# Patient Record
Sex: MALE | Race: WHITE | Employment: FULL TIME | URBAN - NONMETROPOLITAN AREA
[De-identification: names, ages, dates, MRNs, and addresses within clinical notes are randomized per-mention and may not be internally consistent; named-entity substitution may affect disease eponyms.]

---

## 2017-08-14 ENCOUNTER — HOSPITAL ENCOUNTER (OUTPATIENT)
Dept: OTHER | Age: 37
Discharge: HOME | End: 2017-08-31
Attending: PREVENTIVE MEDICINE | Admitting: PREVENTIVE MEDICINE

## 2017-09-01 ENCOUNTER — HOSPITAL ENCOUNTER (OUTPATIENT)
Dept: OTHER | Age: 37
Discharge: HOME | End: 2017-09-30
Attending: PREVENTIVE MEDICINE | Admitting: PREVENTIVE MEDICINE

## 2017-10-01 ENCOUNTER — HOSPITAL ENCOUNTER (OUTPATIENT)
Dept: OTHER | Age: 37
Discharge: HOME | End: 2017-10-31
Attending: PREVENTIVE MEDICINE | Admitting: PREVENTIVE MEDICINE

## 2017-11-01 ENCOUNTER — HOSPITAL ENCOUNTER (OUTPATIENT)
Dept: OTHER | Age: 37
Discharge: HOME | End: 2017-11-30
Attending: PREVENTIVE MEDICINE | Admitting: PREVENTIVE MEDICINE

## 2017-12-01 ENCOUNTER — HOSPITAL ENCOUNTER (OUTPATIENT)
Dept: OTHER | Age: 37
Discharge: HOME | End: 2017-12-31
Attending: PREVENTIVE MEDICINE | Admitting: PREVENTIVE MEDICINE

## 2018-01-01 ENCOUNTER — HOSPITAL ENCOUNTER (OUTPATIENT)
Dept: OTHER | Age: 38
Discharge: HOME | End: 2018-01-31
Attending: PREVENTIVE MEDICINE | Admitting: PREVENTIVE MEDICINE

## 2019-04-19 ENCOUNTER — HOSPITAL ENCOUNTER (EMERGENCY)
Age: 39
Discharge: HOME | End: 2019-04-19
Attending: EMERGENCY MEDICINE

## 2019-04-19 VITALS
DIASTOLIC BLOOD PRESSURE: 61 MMHG | WEIGHT: 150 LBS | OXYGEN SATURATION: 100 % | BODY MASS INDEX: 22.22 KG/M2 | TEMPERATURE: 98.8 F | SYSTOLIC BLOOD PRESSURE: 110 MMHG | HEIGHT: 69 IN | RESPIRATION RATE: 16 BRPM | HEART RATE: 76 BPM

## 2019-04-19 DIAGNOSIS — R19.7 DIARRHEA, UNSPECIFIED TYPE: ICD-10-CM

## 2019-04-19 DIAGNOSIS — R10.13 EPIGASTRIC PAIN: ICD-10-CM

## 2019-04-19 DIAGNOSIS — R11.0 NAUSEA: Primary | ICD-10-CM

## 2019-04-19 LAB
ALBUMIN SERPL-MCNC: 4.6 GM/DL (ref 3.4–5)
ALP BLD-CCNC: 56 IU/L (ref 40–129)
ALT SERPL-CCNC: 74 U/L (ref 10–40)
ANION GAP SERPL CALCULATED.3IONS-SCNC: 10 MMOL/L (ref 4–16)
AST SERPL-CCNC: 31 IU/L (ref 15–37)
BACTERIA: ABNORMAL /HPF
BASOPHILS ABSOLUTE: 0 K/CU MM
BASOPHILS RELATIVE PERCENT: 0.2 % (ref 0–1)
BILIRUB SERPL-MCNC: 0.7 MG/DL (ref 0–1)
BILIRUBIN URINE: NEGATIVE MG/DL
BLOOD, URINE: NEGATIVE
BUN BLDV-MCNC: 7 MG/DL (ref 6–23)
CALCIUM SERPL-MCNC: 9.3 MG/DL (ref 8.3–10.6)
CHLORIDE BLD-SCNC: 99 MMOL/L (ref 99–110)
CLARITY: ABNORMAL
CO2: 26 MMOL/L (ref 21–32)
COLOR: YELLOW
CREAT SERPL-MCNC: 1.1 MG/DL (ref 0.9–1.3)
DIFFERENTIAL TYPE: ABNORMAL
EOSINOPHILS ABSOLUTE: 0 K/CU MM
EOSINOPHILS RELATIVE PERCENT: 0.3 % (ref 0–3)
GFR AFRICAN AMERICAN: >60 ML/MIN/1.73M2
GFR NON-AFRICAN AMERICAN: >60 ML/MIN/1.73M2
GLUCOSE BLD-MCNC: 127 MG/DL (ref 70–99)
GLUCOSE, URINE: NEGATIVE MG/DL
HCT VFR BLD CALC: 45.4 % (ref 42–52)
HEMOGLOBIN: 14.9 GM/DL (ref 13.5–18)
IMMATURE NEUTROPHIL %: 0.2 % (ref 0–0.43)
KETONES, URINE: ABNORMAL MG/DL
LEUKOCYTE ESTERASE, URINE: NEGATIVE
LIPASE: 18 IU/L (ref 13–60)
LYMPHOCYTES ABSOLUTE: 1.5 K/CU MM
LYMPHOCYTES RELATIVE PERCENT: 21.9 % (ref 24–44)
MCH RBC QN AUTO: 28.8 PG (ref 27–31)
MCHC RBC AUTO-ENTMCNC: 32.8 % (ref 32–36)
MCV RBC AUTO: 87.8 FL (ref 78–100)
MONOCYTES ABSOLUTE: 0.4 K/CU MM
MONOCYTES RELATIVE PERCENT: 6.1 % (ref 0–4)
MUCUS: ABNORMAL HPF
NITRITE URINE, QUANTITATIVE: NEGATIVE
NUCLEATED RBC %: 0 %
PDW BLD-RTO: 12.2 % (ref 11.7–14.9)
PH, URINE: 6 (ref 5–8)
PLATELET # BLD: 215 K/CU MM (ref 140–440)
PMV BLD AUTO: 9.4 FL (ref 7.5–11.1)
POTASSIUM SERPL-SCNC: 4.4 MMOL/L (ref 3.5–5.1)
PROTEIN UA: 30 MG/DL
RBC # BLD: 5.17 M/CU MM (ref 4.6–6.2)
RBC URINE: 2 /HPF (ref 0–3)
SEGMENTED NEUTROPHILS ABSOLUTE COUNT: 4.7 K/CU MM
SEGMENTED NEUTROPHILS RELATIVE PERCENT: 71.3 % (ref 36–66)
SODIUM BLD-SCNC: 135 MMOL/L (ref 135–145)
SPECIFIC GRAVITY UA: 1.02 (ref 1–1.03)
SPERM: ABNORMAL /HFP
SQUAMOUS EPITHELIAL: <1 /HPF
TOTAL IMMATURE NEUTOROPHIL: 0.01 K/CU MM
TOTAL NUCLEATED RBC: 0 K/CU MM
TOTAL PROTEIN: 7.3 GM/DL (ref 6.4–8.2)
TRICHOMONAS: ABNORMAL /HPF
UROBILINOGEN, URINE: NORMAL MG/DL (ref 0.2–1)
WBC # BLD: 6.6 K/CU MM (ref 4–10.5)
WBC UA: 1 /HPF (ref 0–2)

## 2019-04-19 PROCEDURE — 6370000000 HC RX 637 (ALT 250 FOR IP): Performed by: EMERGENCY MEDICINE

## 2019-04-19 PROCEDURE — 83690 ASSAY OF LIPASE: CPT

## 2019-04-19 PROCEDURE — 99283 EMERGENCY DEPT VISIT LOW MDM: CPT

## 2019-04-19 PROCEDURE — 81001 URINALYSIS AUTO W/SCOPE: CPT

## 2019-04-19 PROCEDURE — 36415 COLL VENOUS BLD VENIPUNCTURE: CPT

## 2019-04-19 PROCEDURE — 80053 COMPREHEN METABOLIC PANEL: CPT

## 2019-04-19 PROCEDURE — 85025 COMPLETE CBC W/AUTO DIFF WBC: CPT

## 2019-04-19 RX ORDER — ONDANSETRON 4 MG/1
4 TABLET, ORALLY DISINTEGRATING ORAL ONCE
Status: COMPLETED | OUTPATIENT
Start: 2019-04-19 | End: 2019-04-19

## 2019-04-19 RX ORDER — DICYCLOMINE HYDROCHLORIDE 10 MG/1
10 CAPSULE ORAL 3 TIMES DAILY
Qty: 15 CAPSULE | Refills: 0 | Status: SHIPPED | OUTPATIENT
Start: 2019-04-19 | End: 2022-04-04

## 2019-04-19 RX ORDER — FAMOTIDINE 20 MG/1
20 TABLET, FILM COATED ORAL 2 TIMES DAILY
Qty: 60 TABLET | Refills: 0 | Status: SHIPPED | OUTPATIENT
Start: 2019-04-19 | End: 2022-04-04

## 2019-04-19 RX ORDER — ONDANSETRON 4 MG/1
4 TABLET, ORALLY DISINTEGRATING ORAL EVERY 8 HOURS PRN
Qty: 15 TABLET | Refills: 0 | Status: SHIPPED | OUTPATIENT
Start: 2019-04-19 | End: 2022-04-04

## 2019-04-19 RX ADMIN — ONDANSETRON 4 MG: 4 TABLET, ORALLY DISINTEGRATING ORAL at 10:56

## 2019-04-19 RX ADMIN — HYOSCYAMINE SULFATE 125 MCG: 0.12 TABLET ORAL; SUBLINGUAL at 10:56

## 2019-04-19 ASSESSMENT — ENCOUNTER SYMPTOMS
CONSTIPATION: 0
ABDOMINAL PAIN: 1
SORE THROAT: 0
BLOOD IN STOOL: 0
SHORTNESS OF BREATH: 0
VOMITING: 0
NAUSEA: 1
COUGH: 0
EYE REDNESS: 0
BACK PAIN: 0
DIARRHEA: 1
RHINORRHEA: 0

## 2019-04-19 ASSESSMENT — PAIN DESCRIPTION - DESCRIPTORS: DESCRIPTORS: JABBING;STABBING

## 2019-04-19 ASSESSMENT — PAIN DESCRIPTION - ORIENTATION: ORIENTATION: MID

## 2019-04-19 ASSESSMENT — PAIN DESCRIPTION - FREQUENCY: FREQUENCY: CONTINUOUS

## 2019-04-19 ASSESSMENT — PAIN DESCRIPTION - PAIN TYPE: TYPE: ACUTE PAIN

## 2019-04-19 ASSESSMENT — PAIN DESCRIPTION - LOCATION: LOCATION: ABDOMEN

## 2019-04-19 ASSESSMENT — PAIN SCALES - GENERAL: PAINLEVEL_OUTOF10: 9

## 2019-04-19 NOTE — ED NOTES
Discharge instructions given to pt, pt verbalized understanding. All questions answered. Follow-up instructions given.      Jennifer Luna RN  04/19/19 [id number]

## 2019-04-19 NOTE — ED PROVIDER NOTES
Triage Chief Complaint:   Abdominal Pain (mid); Nausea; and Diarrhea    [place]:  Saundra Smith is a 41 y.o. male that presents with epigastric abdominal pain, nausea and diarrhea since yesterday. The pain is in the mid abdominal and epigastric region and constant. He describes it as a stabbing pain. It does not radiate. The diarrhea is watery and nonbloody. He has had nausea but no vomiting. The pain since the increase with caffeine or drinking a 5 hour energy drink. Feels different than previous GERD. No chest pain or shortness of breath. No recent travel. No recent antibiotic use. No known sick contacts. ROS:   Review of Systems   Constitutional: Positive for fatigue. Negative for chills and fever. HENT: Negative for congestion, rhinorrhea and sore throat. Eyes: Negative for redness and visual disturbance. Respiratory: Negative for cough and shortness of breath. Cardiovascular: Negative for chest pain and leg swelling. Gastrointestinal: Positive for abdominal pain (epigastric), diarrhea and nausea. Negative for blood in stool, constipation and vomiting. Genitourinary: Negative for dysuria, frequency and hematuria. Musculoskeletal: Negative for arthralgias and back pain. Skin: Negative for rash and wound. Neurological: Negative for syncope and headaches. Psychiatric/Behavioral: Negative for hallucinations and suicidal ideas. Past Medical History:   Diagnosis Date    Abscess 12/8/2012    left forearm    Cat bite involving extremity 12/22/2011    Cellulitis of antecubital fossa 12/22/2011    Drug addiction [facility])     heroin     Past Surgical History:   Procedure Laterality Date    DENTAL SURGERY  4/1/13     History reviewed. No pertinent family history.   Social History     Socioeconomic History    Marital status: Single     Spouse name: Not on file    Number of children: Not on file    Years of education: Not on file    Highest education level: Not on file Occupational History    Not on file   Social Needs    Financial resource strain: Not on file    Food insecurity:     Worry: Not on file     Inability: Not on file    Transportation needs:     Medical: Not on file     Non-medical: Not on file   Tobacco Use    Smoking status: Current Every Day Smoker     Packs/day: 1.00     Types: Cigarettes    Smokeless tobacco: Never Used   Substance and Sexual Activity    Alcohol use: No    Drug use: Yes     Frequency: 1.0 times per week     Types: IV     Comment: states he is clean for 192 days    Sexual activity: Not on file   Lifestyle    Physical activity:     Days per week: Not on file     Minutes per session: Not on file    Stress: Not on file   Relationships    Social connections:     Talks on phone: Not on file     Gets together: Not on file     Attends Mu-ism service: Not on file     Active member of club or organization: Not on file     Attends meetings of clubs or organizations: Not on file     Relationship status: Not on file    Intimate partner violence:     Fear of current or ex partner: Not on file     Emotionally abused: Not on file     Physically abused: Not on file     Forced sexual activity: Not on file   Other Topics Concern    Not on file   Social History Narrative    Not on file     No current facility-administered medications for this encounter. Current Outpatient Medications   Medication Sig Dispense Refill    dicyclomine (BENTYL) 10 MG capsule Take 1 capsule by mouth 3 times daily As needed for abdominal pain 15 capsule 0    ondansetron (ZOFRAN ODT) 4 MG disintegrating tablet Take 1 tablet by mouth every 8 hours as needed for Nausea 15 tablet 0    famotidine (PEPCID) 20 MG tablet Take 1 tablet by mouth 2 times daily 60 tablet 0    bacitracin-polymyxin b (POLYSPORIN) 500-29160 UNIT/GM ointment Apply topically 2 times daily.  1 Tube 1     No Known Allergies    Nursing Notes Reviewed     Physical Exam:   ED Triage Vitals   Enc Vitals Group      BP       Pulse       Resp       Temp       Temp src       SpO2       Weight       Height       Head Circumference       Peak Flow       Pain Score       Pain Loc       Pain Edu? Excl. in [address]? /61   Pulse 76   Temp 98.8 °F (37.1 °C) (Oral)   Resp 16   Ht 5' 9\" (1.753 m)   Wt 150 lb (68 kg)   SpO2 100%   BMI 22.15 kg/m²   My pulse ox interpretation is - normal  Physical Exam   Constitutional: He appears well-developed and well-nourished. Appears to feel unwell   HENT:   Head: Normocephalic and atraumatic. Eyes: Conjunctivae are normal. Right eye exhibits no discharge. Left eye exhibits no discharge. Cardiovascular: Normal rate, regular rhythm and intact distal pulses. Pulmonary/Chest: Effort normal and breath sounds normal. No respiratory distress. He has no wheezes. Abdominal: Soft. He exhibits no distension. There is generalized tenderness and tenderness in the epigastric area. There is no rebound and no guarding. Musculoskeletal: Normal range of motion. He exhibits no deformity. Neurological: He is alert. No cranial nerve deficit. Skin: Skin is warm and dry. He is not diaphoretic. Psychiatric: He has a normal mood and affect.  His behavior is normal.       I have reviewed and interpreted all of the currently available lab results from this visit (if applicable):  Results for orders placed or performed during the hospital encounter of 04/19/19   CBC Auto Differential   Result Value Ref Range    WBC 6.6 4.0 - 10.5 K/CU MM    RBC 5.17 4.6 - 6.2 M/CU MM    Hemoglobin 14.9 13.5 - 18.0 GM/DL    Hematocrit 45.4 42 - 52 %    MCV 87.8 78 - 100 FL    MCH 28.8 27 - 31 PG    MCHC 32.8 32.0 - 36.0 %    RDW 12.2 11.7 - 14.9 %    Platelets 748 165 - 740 K/CU MM    MPV 9.4 7.5 - 11.1 FL    Differential Type AUTOMATED DIFFERENTIAL     Segs Relative 71.3 (H) 36 - 66 %    Lymphocytes % 21.9 (L) 24 - 44 %    Monocytes % 6.1 (H) 0 - 4 %    Eosinophils % 0.3 0 - 3 %    Basophils % 0.2 0 - 1 %    Segs Absolute 4.7 K/CU MM    Lymphocytes # 1.5 K/CU MM    Monocytes # 0.4 K/CU MM    Eosinophils # 0.0 K/CU MM    Basophils # 0.0 K/CU MM    Nucleated RBC % 0.0 %    Total Nucleated RBC 0.0 K/CU MM    Total Immature Neutrophil 0.01 K/CU MM    Immature Neutrophil % 0.2 0 - 0.43 %   Comprehensive Metabolic Panel w/ Reflex to MG   Result Value Ref Range    Sodium 135 135 - 145 MMOL/L    Potassium 4.4 3.5 - 5.1 MMOL/L    Chloride 99 99 - 110 mMol/L    CO2 26 21 - 32 MMOL/L    BUN 7 6 - 23 MG/DL    CREATININE 1.1 0.9 - 1.3 MG/DL    Glucose 127 (H) 70 - 99 MG/DL    Calcium 9.3 8.3 - 10.6 MG/DL    Alb 4.6 3.4 - 5.0 GM/DL    Total Protein 7.3 6.4 - 8.2 GM/DL    Total Bilirubin 0.7 0.0 - 1.0 MG/DL    ALT 74 (H) 10 - 40 U/L    AST 31 15 - 37 IU/L    Alkaline Phosphatase 56 40 - 129 IU/L    GFR Non-African American >60 >60 mL/min/1.73m2    GFR African American >60 >60 mL/min/1.73m2    Anion Gap 10 4 - 16   Lipase   Result Value Ref Range    Lipase 18 13 - 60 IU/L   Urinalysis, reflex to microscopic   Result Value Ref Range    Color, UA YELLOW YELLOW    Clarity, UA HAZY (A) CLEAR    Glucose, Urine NEGATIVE NEGATIVE MG/DL    Bilirubin Urine NEGATIVE NEGATIVE MG/DL    Ketones, Urine MODERATE (A) NEGATIVE MG/DL    Specific Gravity, UA 1.018 1.001 - 1.035    Blood, Urine NEGATIVE NEGATIVE    pH, Urine 6.0 5.0 - 8.0    Protein, UA 30 (A) NEGATIVE MG/DL    Urobilinogen, Urine NORMAL 0.2 - 1.0 MG/DL    Nitrite Urine, Quantitative NEGATIVE NEGATIVE    Leukocyte Esterase, Urine NEGATIVE NEGATIVE    RBC, UA 2 0 - 3 /HPF    WBC, UA 1 0 - 2 /HPF    Bacteria, UA RARE (A) NEGATIVE /HPF    Squam Epithel, UA <1 /HPF    Mucus, UA MANY (A) NEGATIVE HPF    Sperm, UA FEW /HFP    Trichomonas, UA NONE SEEN NONE SEEN /HPF      Radiographs (if obtained):  [] The following radiograph was interpreted by myself in the absence of a radiologist:  [x]Radiologist's Report Reviewed:  No orders to display         EKG (if obtained): (All EKG's are interpreted by myself in the absence of a cardiologist)    MDM:  Differential diagnoses considered include PUD, GERD, pancreatitis, cholecystitis, colitis, gastroenteritis, opiate withdrawal.     Labs are obtained and are unremarkable. Lipase is within normal limits, I do not suspect pancreatitis. Urine is not concerning for an infection. After the Zofran and Levsin, patient's symptoms are significantly improved. At this point in time the middle exam is benign. I do not suspect an emergent cause of his symptoms. We'll treat him with antispasmodic medication and antinausea medication. I encouraged adequate fluid hydration. We'll discharge him home in stable condition. I also encouraged close follow-up with his primary care physician. Plan of care explained to patient. Concerning signs and symptoms warranting a return visit to the Emergency Department were explained in detail. All questions and concerns were addressed to the patient's satisfaction. Patient understood and agreed with plan. The likelihood of other entities in the differential is insufficient to justify any further testing for them. This was explained to the patient. The patient was advised that persistent or worsening symptoms would requirefurther evaluation. Clinical Impression:  1. Nausea    2. Diarrhea, unspecified type    3. Epigastric pain          Vivien Travis MD       Please note that portions of this note may have been complete with a voice recognition program.  Effortswere made to edit the dictations, but occasional words are mis-transcribed.           Vivien Travis MD  04/19/19 0144

## 2019-09-24 ENCOUNTER — HOSPITAL ENCOUNTER (OUTPATIENT)
Dept: GENERAL RADIOLOGY | Age: 39
Discharge: HOME | End: 2019-09-24

## 2019-09-24 ENCOUNTER — HOSPITAL ENCOUNTER (OUTPATIENT)
Age: 39
Discharge: HOME | End: 2019-09-24

## 2019-09-24 DIAGNOSIS — Z02.1 ENCOUNTER FOR PRE-EMPLOYMENT HEALTH SCREENING EXAMINATION: ICD-10-CM

## 2019-09-24 PROCEDURE — 71046 X-RAY EXAM CHEST 2 VIEWS: CPT

## 2021-09-16 ENCOUNTER — HOSPITAL ENCOUNTER (OUTPATIENT)
Dept: PSYCHIATRY | Age: 41
Setting detail: THERAPIES SERIES
Discharge: HOME | End: 2021-09-16
Payer: MEDICARE

## 2021-09-16 PROCEDURE — 90791 PSYCH DIAGNOSTIC EVALUATION: CPT

## 2021-09-16 PROCEDURE — 80305 DRUG TEST PRSMV DIR OPT OBS: CPT

## 2021-09-16 ASSESSMENT — PATIENT HEALTH QUESTIONNAIRE - PHQ9: SUM OF ALL RESPONSES TO PHQ QUESTIONS 1-9: 22

## 2021-09-16 ASSESSMENT — ANXIETY QUESTIONNAIRES
6. BECOMING EASILY ANNOYED OR IRRITABLE: 2
IF YOU CHECKED OFF ANY PROBLEMS ON THIS QUESTIONNAIRE, HOW DIFFICULT HAVE THESE PROBLEMS MADE IT FOR YOU TO DO YOUR WORK, TAKE CARE OF THINGS AT HOME, OR GET ALONG WITH OTHER PEOPLE: SOMEWHAT DIFFICULT
GAD7 TOTAL SCORE: 7
7. FEELING AFRAID AS IF SOMETHING AWFUL MIGHT HAPPEN: 0
2. NOT BEING ABLE TO STOP OR CONTROL WORRYING: 1
5. BEING SO RESTLESS THAT IT IS HARD TO SIT STILL: 1
4. TROUBLE RELAXING: 1
1. FEELING NERVOUS, ANXIOUS, OR ON EDGE: 1
3. WORRYING TOO MUCH ABOUT DIFFERENT THINGS: 1

## 2021-09-16 ASSESSMENT — LIFESTYLE VARIABLES: HISTORY_ALCOHOL_USE: YES

## 2021-09-16 NOTE — PROGRESS NOTES
[address] [place] [address] Urinalysis Laboratory Testing and Medical History      Location: [x] [place] [] Ashley Hutchison MD., [id number], Medical Director of [facility] SURGICAL [facility] Director orders for [address] [place] [address] clinical therapists to collect an urine sample from:    Client: Lissette Hernández   : 1980   Case# [id number]    Urine sample will be collected following the collections guidelines provided on Clinical [facility] [place]) custody form, and completion of the [facility] Non-Federal chain of custody drug screening form. During the course of treatment, randomly a urine sample will be collected, at a minimum of one time a month, more frequently as needed, as part of the clinical outpatient alcohol and drug treatment program at [address] [place] [address]. Medical care recommendation for clients experiencing/reporting  medical concerns, who do not have a family physician and willing to attend  medical care will be assisted in seeking medical care. Clinical providers will refer clients to local family physicians practices as part of the  clients treatment plan and assist the client in gaining access to an appointment. Release of information will be requested to support the  clients seeking medical care. Summary of Medical History  Prior to Admission medications    Medication Sig Start Date End Date Taking? Authorizing Provider   dicyclomine (BENTYL) 10 MG capsule Take 1 capsule by mouth 3 times daily As needed for abdominal pain 19   Malik Voss MD   ondansetron (ZOFRAN ODT) 4 MG disintegrating tablet Take 1 tablet by mouth every 8 hours as needed for Nausea 19   Malik Voss MD   famotidine (PEPCID) 20 MG tablet Take 1 tablet by mouth 2 times daily 19   Malik Voss MD   bacitracin-polymyxin b (POLYSPORIN) 500-20074 UNIT/GM ointment Apply topically 2 times daily.  10/19/17   Deanne Shook PA-C     Past Surgical History:   Procedure Laterality Date   [address] [facility] SURGERY  13     Past Medical History:   Diagnosis Date    Abscess 12/8/2012    left forearm    Cat bite involving extremity 12/22/2011    Cellulitis of antecubital fossa 12/22/2011    Drug addiction [facility])     heroin     Patient Active Problem List    Diagnosis Date Noted    Cat bite involving extremity 12/22/2011    Cellulitis of antecubital fossa 12/22/2011    Heroin use 04/22/2013    Overdose 04/05/2013    Smoker 12/19/2012    Abscess of arm, left 12/19/2012       Sharmaine Adams, LCDCIII  9/16/2021/2:47 PM

## 2021-09-16 NOTE — PROGRESS NOTES
[facility]        Individual  Progress Note    Location: [x] [place] [] [facility]                   Patient Name: Dorita Rangel   : 1980     Case # :  [id number]  Therapist: TAMARA Skinner        Objective/Service/Time: Kept 1 hour Assessment     S-Client is a 37year old  male self referred for Meth use. Client reports he has been using Meth for the past 4-5 years and would like to stop. He is single but lives with his girlfriend and their 3 children. Client is unemployed. O-Client was cooperative, his thought process was logical, his mood euthymic, his affect full and his speech normal. Client denies any hallucinations or delusions. No HI/SI. A-Completed intake paperwork, began assessment and administered initial UDS. Client met criteria for level 2.1 IOP. P-Client will return on 2021 at 2:00pm to complete assessment.          Electronically signed by Iveth Conway on 2021 at 2:59 PM

## 2021-09-16 NOTE — PROGRESS NOTES
meetings  Summary of Medical History:  Prior to Admission medications    Medication Sig Start Date End Date Taking? Authorizing Provider   dicyclomine (BENTYL) 10 MG capsule Take 1 capsule by mouth 3 times daily As needed for abdominal pain 4/19/19   Vj Rodrigues MD   ondansetron (ZOFRAN ODT) 4 MG disintegrating tablet Take 1 tablet by mouth every 8 hours as needed for Nausea 4/19/19   Vj Rodrigues MD   famotidine (PEPCID) 20 MG tablet Take 1 tablet by mouth 2 times daily 4/19/19   Vj Rodrigues MD   bacitracin-polymyxin b (POLYSPORIN) 500-57207 UNIT/GM ointment Apply topically 2 times daily. 10/19/17   Arianna Whaley PA-C     Past Surgical History:   Procedure Laterality Date    DENTAL SURGERY  4/1/13     Past Medical History:   Diagnosis Date    Abscess 12/8/2012    left forearm    Cat bite involving extremity 12/22/2011    Cellulitis of antecubital fossa 12/22/2011    Drug addiction [facility])     heroin     Patient Active Problem List    Diagnosis Date Noted    Cat bite involving extremity 12/22/2011    Cellulitis of antecubital fossa 12/22/2011    Heroin use 04/22/2013    Overdose 04/05/2013    Smoker 12/19/2012    Abscess of arm, left 12/19/2012       6. Level of Care Determination:      2.1 Intensive Outpatient Services   7. Treatment available      __x__yes   _____no         8.   Name of program referred:    __x__[facility],    ____[facility],       _______other/identify     Electronically signed by Karl Thurman on 9/16/2021 at 3:10 PM

## 2021-09-17 ENCOUNTER — HOSPITAL ENCOUNTER (OUTPATIENT)
Dept: PSYCHIATRY | Age: 41
Setting detail: THERAPIES SERIES
Discharge: HOME | End: 2021-09-17
Payer: MEDICARE

## 2021-09-17 PROCEDURE — H2020 THER BEHAV SVC, PER DIEM: HCPCS

## 2021-09-20 ENCOUNTER — HOSPITAL ENCOUNTER (OUTPATIENT)
Dept: PSYCHIATRY | Age: 41
Setting detail: THERAPIES SERIES
Discharge: HOME | End: 2021-09-20
Payer: MEDICARE

## 2021-09-20 PROCEDURE — H2020 THER BEHAV SVC, PER DIEM: HCPCS

## 2021-09-20 NOTE — PROGRESS NOTES
NALOXONE:  Patient Assessment and Dispensing Record   Date:  9/20/2021  Patient Name: Rasheed Elizondo  Patient Address: [address]  [address]         Patient Selection: Check that the following conditions are met  []  The patient is an individual who is experiencing or at risk of experiencing an       opioid-related overdose. [x]  The individual receiving the information and medication is        oriented to person, place, and time and able to understand and learn the        essential components of overdose response and naloxone administration. Indication for dispensing naloxone  []  Previous opioid intoxication or overdose. []  History of nonmedical opioid use.   []  Initiation or cessation of methadone or buprenorphine for opioid use disorder        treatment. []  Higher-dose (>50 mg morphine equivalent/day) opioid prescription. []  Receiving any opioid prescription plus (select below):  [] Rotated from one opioid to another because of possible incomplete cross-tolerance. [] Smoking, COPD, emphysema, asthma, sleep apnea, respiratory infection or other respiratory illness. [] Renal dysfunction, hepatic disease, cardiac illness or HIV/AIDS. [] Known or suspected concurrent alcohol use. [] Concurrent benzodiazepine or other sedative prescription. [] Concurrent antidepressant prescription. [] Patients who may have difficulty accessing emergency medical services (distance, remoteness). [x] Voluntary request from a family member, friend,  or other person in a position to assist an individual who is at risk of experiencing an opioid-related overdose. I (the undersigned) attest that:  [x]  I am authorized per hospital protocol to dispense naloxone to this patient. []  The individual has been screened for contraindications/precautions and        counseled appropriately. [x]  I have counseled the patient using the protocol approved informational pamphlet.    []  I have updated the discharge record to reflect this dispensing of naloxone.      Signature:  Raeann Ambrose [address]  9/66/4291, 9:44 PM

## 2021-09-20 NOTE — GROUP NOTE
[facility] Group Therapy Note      9/20/2021    Location:  [place]      Clients Presents: [phone]     Clients Absent: 1068    Length of session: 3.0 hours    Group Note: IOP    Group Type: Co-Ed    New members were welcomed and introduced. Norms and expectations of group were discussed. Content: GROUP CHECKED-IN  TOPIC:  \"Denial Journal\"  Clients began working on a denial journal. Clients learned to recognize how denial has deep roots in their daily lives. Clients learned to understand the difference between denial and lying. Clients explored how their pattern of denial has grown. Laurie Rose  8/91/7856 0:55 PM          [facility] Individual Group Progress Note    Dom Molina  1980 9/20/2021    Notes on Client Progress in Group    Arelis Morillo was attentive. He stated, I feel exhausted! \" \"I used on Saturday! \" Client verbalized how he has been dealing with cravings and may need a HLOC. Client received good feedback from other group members. Participated in the group discussion, shared opening, offered insight into understanding the role of denial in his own life. Began completing Denial Journal and gave appropriated feedback. Received Narcan Kit.      Laurie Rose  2/04/1116 5:46 PM    Co-Therapist: N/A

## 2021-09-22 ENCOUNTER — HOSPITAL ENCOUNTER (OUTPATIENT)
Dept: PSYCHIATRY | Age: 41
Setting detail: THERAPIES SERIES
Discharge: HOME | End: 2021-09-22
Payer: MEDICARE

## 2021-09-22 PROCEDURE — H2020 THER BEHAV SVC, PER DIEM: HCPCS

## 2021-09-22 NOTE — GROUP NOTE
[facility] Group Therapy Note      9/22/2021    Location:  [place]      Clients Presents: [phone]    Clients Absent: 1100    Length of session: 3.0 hours    Group Note: IOP    Group Type: Co-Ed    New members were welcomed and introduced. Norms and expectations of group were discussed. Content: GROUP CHECKED-IN  TOPIC:  \"Denial Part II\" Journal  Clients leaned how a faulty memory keeps you in denial. Clients examined non-feeling defenses and how they have kept them from seeing the impact of their substance use. Clients utilized the [employer] to recognize their own denial and leaned how to ask and receive assistance from others. Laurie Rose  8/89/5412 49:76 PM          [facility] Individual Group Progress Note    Tasia King  1980 9/22/2021    Notes on Client Progress in Group    Joan Hines was attentive. He stated, \"I feel confident! \" \"I can stay clean this week! \"   Participated in the group discussion, shared openly, offered insight in understanding, recognizing, and overcoming denial.  Completed treatment journal and gave appropriate feedback.        Laurie Rose  0/03/0081 81:48 PM    Co-Therapist: N/A

## 2021-09-23 ENCOUNTER — HOSPITAL ENCOUNTER (OUTPATIENT)
Dept: PSYCHIATRY | Age: 41
Setting detail: THERAPIES SERIES
Discharge: HOME | End: 2021-09-23
Payer: MEDICARE

## 2021-09-23 PROCEDURE — 90834 PSYTX W PT 45 MINUTES: CPT

## 2021-09-23 PROCEDURE — H2020 THER BEHAV SVC, PER DIEM: HCPCS

## 2021-09-23 NOTE — PROGRESS NOTES
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 Problem: History of Substance use resulting in not being employable. a. Goal: Client will enhance personalized knowledge and insight associated with mood altering substances in 90 days   b. Objectives:   i. 1) Client will remind self of unmanageability of his life with he was in active use in 90 days Evaluation Date: 12/23/2021 Code: C Continue TBD     ii. 2) Client will identify 3-5 negative consequences of AoD use in 90 days: Evaluation Date: 12/23/2021 Code: C Continue TBD     iii. 3) Client will identify 4 relapse triggers, define relapse, difference between internal and external triggers associated with substance use in 90 days and Evaluation Date: 12/23/2021 Code: C Continue TBD     2. Problem: Low Self-Esteem/Identify issues as a result of self-medicating. a. Goal: Client will learn coping skills to manage feelings of depression and sadness in 90 days. b. Objectives:   i. 1) Client will log 3-5 times weekly thoughts and feelings and writing about improvements to share in his individual sessions in 90 days:  Evaluation Date: 12/23/2021 Code: C Continue TBD      ii. 2) Client will explore new outdoor/indoor activities that bring him dcu in 90 days Evaluation Date:12/23/2021 Code: C Continue TBD      iii.  3)  Utilize, if needed case management services provided by [facility] to enhance abstaining from substance use Evaluation Date:12/23/2021 Code: C Continue TBD         3. Problem: History of Relapse due to lack of sober support. a. Goal: Identify and address the core dynamics and dilemmas that are perpetuating consequences and exacerbate relapses and triggers and in 90 days. b. Objectives:  i. 1)  Client will attend 1-2 AA/NA or Jainism in person or online meetings weekly to avoid relapse in 90 days Evaluation Date: 12/23/2021 Code: C Continue TBD     2) Client will enhance 4 to 8 healthy techniques and coping skills, for relapse prevention and anger management in 90 days Evaluation Date: 12/23/2021 Code: C Continue TBD                                                                3) Client will explore and resolve ambivalence                                                                          associated with commitment to change behaviors                                                                  related to substance use and addiction in 90 days. Evaluation Date: 12/23/2021 Code: C Continue TBD      Defer:  Client would like to have worked and be gia to apply the 12 steps of Celebrate Recovery. Discharge Plan/Instructions: Client will complete his treatment plan goals and objectives and maintain abstinence AEB negative UDS. Wm Tracy / 1980 has participated in the treatment plan development outlined above on 9/23/2021.      HOLDEN HungIII  9/23/2021/2:31 PM

## 2021-09-24 ENCOUNTER — HOSPITAL ENCOUNTER (OUTPATIENT)
Dept: PSYCHIATRY | Age: 41
Setting detail: THERAPIES SERIES
Discharge: HOME | End: 2021-09-24
Payer: MEDICARE

## 2021-09-24 PROCEDURE — H2020 THER BEHAV SVC, PER DIEM: HCPCS

## 2021-09-24 NOTE — GROUP NOTE
[facility] Group Therapy Note      9/24/2021    Location:  [place]      Clients Presents: [phone]    Clients Absent: 0    Length of session: 3.0 hours    Group Note: IOP    Group Type: Co-Ed    New members were welcomed and introduced. Norms and expectations of group were discussed. Content: GROUP CHECKED-IN  TOPIC:  \"Why Change\"  Clients explored the need for change in their own lives and completed a worksheet where they identified two specific things they'd like to change and concrete steps to make those changes. Laurie Rose  0/44/3106 9:54 PM        [facility] [employer] Individual Group Progress Note    Keisha Brochure  1980 9/24/2021    Notes on Client Progress in Group    Gaurang Solano was attentive. He stated, \"I feel unmotivated! \" \"I really don't feel like doing anything! \"   Participated in the group discussion, shared openly, offered insight into to the changes that he wanted to make and steps to achieve them. Completed treatment worksheet and gave appropriate feedback.      Laurie Rose  1/87/7588 0:83 PM    Co-Therapist: N/A

## 2021-09-29 ENCOUNTER — HOSPITAL ENCOUNTER (OUTPATIENT)
Dept: PSYCHIATRY | Age: 41
Setting detail: THERAPIES SERIES
Discharge: HOME | End: 2021-09-29
Payer: MEDICARE

## 2021-09-30 ENCOUNTER — APPOINTMENT (OUTPATIENT)
Dept: PSYCHIATRY | Age: 41
End: 2021-09-30
Payer: MEDICARE

## 2021-10-01 ENCOUNTER — APPOINTMENT (OUTPATIENT)
Dept: PSYCHIATRY | Age: 41
End: 2021-10-01
Payer: MEDICARE

## 2021-10-01 ENCOUNTER — HOSPITAL ENCOUNTER (OUTPATIENT)
Dept: PSYCHIATRY | Age: 41
Setting detail: THERAPIES SERIES
Discharge: HOME | End: 2021-10-01
Payer: MEDICARE

## 2021-10-01 PROCEDURE — 80305 DRUG TEST PRSMV DIR OPT OBS: CPT

## 2021-10-01 PROCEDURE — H2020 THER BEHAV SVC, PER DIEM: HCPCS

## 2021-10-01 NOTE — GROUP NOTE
[facility] Group Therapy Note      10/1/2021    Location:  [place]      Clients Presents: [phone]    Clients Absent: [phone]      Length of session: 3.0 hours    Group Note: IOP    Group Type: Co-Ed    New members were welcomed and introduced. Norms and expectations of group were discussed. Content: GROUP CHECKED-IN  TOPIC:  \"Reading of the 12-Steps with discussion\" - \"Social Survival/Hand-Out\" - \"Assessing Your Lifestyle\"  Clients read the 12-Steps of AA and discussed which steps they are familiar with. Clients reviewed ways to stay clean/sober in social settings. Clients completed a 'Lifestyle Assessment' where they identified areas in their lives that they would like to improve upon. Laurie Rose  93/6/2403 6:75 PM          [facility] Individual Group Progress Note    Audra Anil  1980  10/1/2021    Notes on Client Progress in Group    Doug Qiu was not on the IOP schedule due to not showing up for the past 3 sessions. He did not respond to the letter of intent. He stated, \"I continue to use. Cloteal Shelton Cloteal Shelton I just can't stop! I invited him to stay for group today and Monday while we look for a place for him to go into detox. He was attentive. He stated, \"I feel exhausted! Participated in the group discussion, shared openly, offered insight about the 2-Steps of AA, how to stay clean/sober in social settings, and identified areas in his life that he can improve upon. Gave appropriate feedback. Completed UDS.       Laurie Rose  82/5/9724 4:38 PM    Co-Therapist: N/A

## 2021-10-04 ENCOUNTER — APPOINTMENT (OUTPATIENT)
Dept: PSYCHIATRY | Age: 41
End: 2021-10-04
Payer: MEDICARE

## 2021-10-04 ENCOUNTER — HOSPITAL ENCOUNTER (OUTPATIENT)
Dept: PSYCHIATRY | Age: 41
Setting detail: THERAPIES SERIES
Discharge: HOME | End: 2021-10-04
Payer: MEDICARE

## 2021-10-06 ENCOUNTER — APPOINTMENT (OUTPATIENT)
Dept: PSYCHIATRY | Age: 41
End: 2021-10-06
Payer: MEDICARE

## 2021-10-08 ENCOUNTER — APPOINTMENT (OUTPATIENT)
Dept: PSYCHIATRY | Age: 41
End: 2021-10-08
Payer: MEDICARE

## 2021-10-11 ENCOUNTER — APPOINTMENT (OUTPATIENT)
Dept: PSYCHIATRY | Age: 41
End: 2021-10-11
Payer: MEDICARE

## 2021-10-13 ENCOUNTER — APPOINTMENT (OUTPATIENT)
Dept: PSYCHIATRY | Age: 41
End: 2021-10-13
Payer: MEDICARE

## 2021-10-15 ENCOUNTER — APPOINTMENT (OUTPATIENT)
Dept: PSYCHIATRY | Age: 41
End: 2021-10-15
Payer: MEDICARE

## 2021-10-18 ENCOUNTER — APPOINTMENT (OUTPATIENT)
Dept: PSYCHIATRY | Age: 41
End: 2021-10-18
Payer: MEDICARE

## 2021-10-20 ENCOUNTER — APPOINTMENT (OUTPATIENT)
Dept: PSYCHIATRY | Age: 41
End: 2021-10-20
Payer: MEDICARE

## 2021-10-22 ENCOUNTER — APPOINTMENT (OUTPATIENT)
Dept: PSYCHIATRY | Age: 41
End: 2021-10-22
Payer: MEDICARE

## 2021-10-25 ENCOUNTER — APPOINTMENT (OUTPATIENT)
Dept: PSYCHIATRY | Age: 41
End: 2021-10-25
Payer: MEDICARE

## 2021-10-27 ENCOUNTER — APPOINTMENT (OUTPATIENT)
Dept: PSYCHIATRY | Age: 41
End: 2021-10-27
Payer: MEDICARE

## 2021-10-29 ENCOUNTER — APPOINTMENT (OUTPATIENT)
Dept: PSYCHIATRY | Age: 41
End: 2021-10-29
Payer: MEDICARE

## 2021-12-02 ENCOUNTER — APPOINTMENT (OUTPATIENT)
Dept: PSYCHIATRY | Age: 41
End: 2021-12-02
Payer: MEDICARE

## 2021-12-09 ENCOUNTER — APPOINTMENT (OUTPATIENT)
Dept: PSYCHIATRY | Age: 41
End: 2021-12-09
Payer: MEDICARE

## 2021-12-16 ENCOUNTER — APPOINTMENT (OUTPATIENT)
Dept: PSYCHIATRY | Age: 41
End: 2021-12-16
Payer: MEDICARE

## 2022-03-02 ENCOUNTER — OFFICE VISIT (OUTPATIENT)
Dept: FAMILY MEDICINE CLINIC | Age: 42
End: 2022-03-02
Payer: MEDICARE

## 2022-03-02 VITALS
TEMPERATURE: 97 F | BODY MASS INDEX: 29.49 KG/M2 | SYSTOLIC BLOOD PRESSURE: 130 MMHG | HEART RATE: 120 BPM | DIASTOLIC BLOOD PRESSURE: 84 MMHG | HEIGHT: 70 IN | OXYGEN SATURATION: 96 % | WEIGHT: 206 LBS

## 2022-03-02 DIAGNOSIS — F33.2 SEVERE EPISODE OF RECURRENT MAJOR DEPRESSIVE DISORDER, WITHOUT PSYCHOTIC FEATURES (HCC): ICD-10-CM

## 2022-03-02 DIAGNOSIS — I10 PRIMARY HYPERTENSION: ICD-10-CM

## 2022-03-02 DIAGNOSIS — Z13.220 SCREENING FOR HYPERLIPIDEMIA: ICD-10-CM

## 2022-03-02 DIAGNOSIS — F90.2 ATTENTION DEFICIT HYPERACTIVITY DISORDER (ADHD), COMBINED TYPE: Primary | ICD-10-CM

## 2022-03-02 DIAGNOSIS — R73.03 PREDIABETES: ICD-10-CM

## 2022-03-02 DIAGNOSIS — Z13.1 SCREENING FOR DIABETES MELLITUS: ICD-10-CM

## 2022-03-02 DIAGNOSIS — F11.11 MILD OPIOID USE DISORDER, IN SUSTAINED REMISSION (HCC): ICD-10-CM

## 2022-03-02 DIAGNOSIS — G43.109 MIGRAINE WITH AURA AND WITHOUT STATUS MIGRAINOSUS, NOT INTRACTABLE: ICD-10-CM

## 2022-03-02 DIAGNOSIS — E88.81 METABOLIC SYNDROME: ICD-10-CM

## 2022-03-02 LAB
BASOPHILS ABSOLUTE: 0 K/UL (ref 0–0.2)
BASOPHILS RELATIVE PERCENT: 0.4 %
EOSINOPHILS ABSOLUTE: 0.1 K/UL (ref 0–0.6)
EOSINOPHILS RELATIVE PERCENT: 2.3 %
HCT VFR BLD CALC: 42.3 % (ref 40.5–52.5)
HEMOGLOBIN: 14.1 G/DL (ref 13.5–17.5)
LYMPHOCYTES ABSOLUTE: 1.2 K/UL (ref 1–5.1)
LYMPHOCYTES RELATIVE PERCENT: 24.6 %
MCH RBC QN AUTO: 29.8 PG (ref 26–34)
MCHC RBC AUTO-ENTMCNC: 33.3 G/DL (ref 31–36)
MCV RBC AUTO: 89.4 FL (ref 80–100)
MONOCYTES ABSOLUTE: 0.4 K/UL (ref 0–1.3)
MONOCYTES RELATIVE PERCENT: 8.1 %
NEUTROPHILS ABSOLUTE: 3.3 K/UL (ref 1.7–7.7)
NEUTROPHILS RELATIVE PERCENT: 64.6 %
PDW BLD-RTO: 13.8 % (ref 12.4–15.4)
PLATELET # BLD: 226 K/UL (ref 135–450)
PMV BLD AUTO: 7.4 FL (ref 5–10.5)
RBC # BLD: 4.73 M/UL (ref 4.2–5.9)
WBC # BLD: 5.1 K/UL (ref 4–11)

## 2022-03-02 PROCEDURE — 99204 OFFICE O/P NEW MOD 45 MIN: CPT | Performed by: FAMILY MEDICINE

## 2022-03-02 PROCEDURE — G8484 FLU IMMUNIZE NO ADMIN: HCPCS | Performed by: FAMILY MEDICINE

## 2022-03-02 PROCEDURE — 1036F TOBACCO NON-USER: CPT | Performed by: FAMILY MEDICINE

## 2022-03-02 PROCEDURE — 36415 COLL VENOUS BLD VENIPUNCTURE: CPT | Performed by: FAMILY MEDICINE

## 2022-03-02 PROCEDURE — G8419 CALC BMI OUT NRM PARAM NOF/U: HCPCS | Performed by: FAMILY MEDICINE

## 2022-03-02 PROCEDURE — G8427 DOCREV CUR MEDS BY ELIG CLIN: HCPCS | Performed by: FAMILY MEDICINE

## 2022-03-02 RX ORDER — RISPERIDONE 2 MG/1
TABLET, FILM COATED ORAL
COMMUNITY
Start: 2022-02-07 | End: 2022-03-02 | Stop reason: SDUPTHER

## 2022-03-02 RX ORDER — OMEPRAZOLE 20 MG/1
CAPSULE, DELAYED RELEASE ORAL
COMMUNITY
Start: 2022-02-14 | End: 2022-04-04 | Stop reason: SDUPTHER

## 2022-03-02 RX ORDER — DESVENLAFAXINE 100 MG/1
100 TABLET, EXTENDED RELEASE ORAL DAILY
Qty: 90 TABLET | Refills: 1 | Status: SHIPPED | OUTPATIENT
Start: 2022-03-02 | End: 2022-09-11 | Stop reason: SDUPTHER

## 2022-03-02 RX ORDER — LISDEXAMFETAMINE DIMESYLATE 70 MG/1
70 CAPSULE ORAL EVERY MORNING
Qty: 30 CAPSULE | Refills: 0 | Status: SHIPPED | OUTPATIENT
Start: 2022-03-09 | End: 2022-04-04 | Stop reason: SDUPTHER

## 2022-03-02 RX ORDER — RISPERIDONE 3 MG/1
3 TABLET, FILM COATED ORAL NIGHTLY
Qty: 30 TABLET | Refills: 2 | Status: SHIPPED | OUTPATIENT
Start: 2022-03-02 | End: 2022-05-27 | Stop reason: SDUPTHER

## 2022-03-02 RX ORDER — LISINOPRIL 10 MG/1
TABLET ORAL
COMMUNITY
Start: 2022-02-27 | End: 2022-05-12 | Stop reason: SDUPTHER

## 2022-03-02 RX ORDER — ROPINIROLE 0.5 MG/1
TABLET, FILM COATED ORAL
COMMUNITY
Start: 2022-02-14 | End: 2022-08-02 | Stop reason: SDUPTHER

## 2022-03-02 RX ORDER — BUPROPION HYDROCHLORIDE 300 MG/1
TABLET ORAL
COMMUNITY
Start: 2022-02-20 | End: 2022-04-26 | Stop reason: SDUPTHER

## 2022-03-02 RX ORDER — DESVENLAFAXINE 100 MG/1
TABLET, EXTENDED RELEASE ORAL
COMMUNITY
Start: 2022-02-07 | End: 2022-03-02 | Stop reason: SDUPTHER

## 2022-03-02 RX ORDER — RIZATRIPTAN BENZOATE 10 MG/1
10 TABLET ORAL
Qty: 12 TABLET | Refills: 5 | Status: SHIPPED | OUTPATIENT
Start: 2022-03-02 | End: 2022-10-05 | Stop reason: SDUPTHER

## 2022-03-02 RX ORDER — DEXTROAMPHETAMINE SACCHARATE, AMPHETAMINE ASPARTATE, DEXTROAMPHETAMINE SULFATE AND AMPHETAMINE SULFATE 5; 5; 5; 5 MG/1; MG/1; MG/1; MG/1
TABLET ORAL
COMMUNITY
Start: 2022-02-07 | End: 2022-03-02 | Stop reason: SDUPTHER

## 2022-03-02 RX ORDER — LISDEXAMFETAMINE DIMESYLATE 70 MG/1
CAPSULE ORAL
COMMUNITY
Start: 2022-02-07 | End: 2022-03-02 | Stop reason: SDUPTHER

## 2022-03-02 RX ORDER — DEXTROAMPHETAMINE SACCHARATE, AMPHETAMINE ASPARTATE, DEXTROAMPHETAMINE SULFATE AND AMPHETAMINE SULFATE 5; 5; 5; 5 MG/1; MG/1; MG/1; MG/1
20 TABLET ORAL DAILY
Qty: 30 TABLET | Refills: 0 | Status: SHIPPED | OUTPATIENT
Start: 2022-03-09 | End: 2022-04-04 | Stop reason: SDUPTHER

## 2022-03-02 RX ORDER — RISPERIDONE 1 MG/1
TABLET, FILM COATED ORAL
COMMUNITY
Start: 2022-02-14 | End: 2022-03-02 | Stop reason: DRUGHIGH

## 2022-03-02 RX ORDER — CETIRIZINE HYDROCHLORIDE 10 MG/1
TABLET ORAL
COMMUNITY
Start: 2022-02-14 | End: 2022-06-24 | Stop reason: SDUPTHER

## 2022-03-02 SDOH — ECONOMIC STABILITY: FOOD INSECURITY: WITHIN THE PAST 12 MONTHS, THE FOOD YOU BOUGHT JUST DIDN'T LAST AND YOU DIDN'T HAVE MONEY TO GET MORE.: SOMETIMES TRUE

## 2022-03-02 SDOH — ECONOMIC STABILITY: FOOD INSECURITY: WITHIN THE PAST 12 MONTHS, YOU WORRIED THAT YOUR FOOD WOULD RUN OUT BEFORE YOU GOT MONEY TO BUY MORE.: SOMETIMES TRUE

## 2022-03-02 ASSESSMENT — PATIENT HEALTH QUESTIONNAIRE - PHQ9
1. LITTLE INTEREST OR PLEASURE IN DOING THINGS: 1
SUM OF ALL RESPONSES TO PHQ QUESTIONS 1-9: 2
SUM OF ALL RESPONSES TO PHQ QUESTIONS 1-9: 2
SUM OF ALL RESPONSES TO PHQ9 QUESTIONS 1 & 2: 2
2. FEELING DOWN, DEPRESSED OR HOPELESS: 1
SUM OF ALL RESPONSES TO PHQ QUESTIONS 1-9: 2
SUM OF ALL RESPONSES TO PHQ QUESTIONS 1-9: 2

## 2022-03-02 ASSESSMENT — ENCOUNTER SYMPTOMS
COUGH: 1
VOMITING: 0
ABDOMINAL PAIN: 0
DIARRHEA: 0
SHORTNESS OF BREATH: 0

## 2022-03-02 ASSESSMENT — SOCIAL DETERMINANTS OF HEALTH (SDOH): HOW HARD IS IT FOR YOU TO PAY FOR THE VERY BASICS LIKE FOOD, HOUSING, MEDICAL CARE, AND HEATING?: HARD

## 2022-03-02 NOTE — PATIENT INSTRUCTIONS
Patient Education        Recurring Migraine Headache: Care Instructions  Overview  Migraines are painful, throbbing headaches. They often start on one side of the head. They may cause nausea and vomiting and make you sensitive to light, sound, or smell. Some people may have only a few migraines throughout life. Others have them as often as several times a month. The goal of treatment is to reduce the number of migraines you have and relieve your symptoms. Even with treatment, you may continue to have migraines. You play an important role in dealing with your headaches. Work on avoiding things that seem to trigger your migraines. When you feel a headache coming on, act quickly to stop it before it gets worse. Follow-up care is a key part of your treatment and safety. Be sure to make and go to all appointments, and call your doctor if you are having problems. It's also a good idea to know your test results and keep a list of the medicines you take. How can you care for yourself at home? · Do not drive if you have taken a prescription pain medicine. · Rest in a quiet, dark room until your headache is gone. Close your eyes and try to relax or go to sleep. Do not watch TV or read. · Put a cold, moist cloth or cold pack on the painful area for 10 to 20 minutes at a time. Put a thin cloth between the cold pack and your skin. · Have someone gently massage your neck and shoulders. · Take your medicines exactly as prescribed. Call your doctor if you think you are having a problem with your medicine. You will get more details on the specific medicines your doctor prescribes. · Don't take medicine for headache pain too often. Talk to your doctor if you are taking medicine more than 2 days a week to stop a headache. Taking too much pain medicine can lead to more headaches. These are called medicine-overuse headaches. To prevent migraines  · Keep a headache diary so you can figure out what triggers your headaches. Avoiding triggers may help you prevent headaches. Record when each headache began, how long it lasted, and what the pain was like. Write down any other symptoms you had with the headache. These may include nausea, flashing lights or dark spots, or sensitivity to bright light or loud noise. Note if the headache occurred near your period. List anything that might have triggered the headache. Triggers may include certain foods (chocolate, cheese, wine) or odors, smoke, bright light, stress, or lack of sleep. · If your doctor has prescribed medicine for your migraines, take it as directed. You may have medicine that you take only when you get a migraine and medicine that you take all the time to help prevent migraines. ? If your doctor has prescribed medicine for when you get a headache, take it at the first sign of a migraine, unless your doctor has given you other instructions. ? If your doctor has prescribed medicine to prevent migraines, take it exactly as prescribed. Call your doctor if you think you are having a problem with your medicine. · Find healthy ways to deal with stress. Migraines are most common during or right after stressful times. Try finding ways to reduce stress like practicing mindfulness or deep breathing exercises. · Get regular sleep and exercise. But be careful to not push yourself too hard during exercise. It may trigger a headache. · Eat regular meals, and avoid foods and drinks that often trigger migraines. These include chocolate and alcohol, especially red wine and port. Chemicals used in food, such as aspartame and monosodium glutamate (MSG), also can trigger migraines. So can some food additives, such as those found in hot dogs, de la rosa, cold cuts, aged cheeses, and pickled foods. · Limit caffeine by not drinking too much coffee, tea, or soda. Do not quit caffeine suddenly, because that can also give you migraines. · Do not smoke or allow others to smoke around you.  If you need help quitting, talk to your doctor about stop-smoking programs and medicines. These can increase your chances of quitting for good. · If you are taking birth control pills or hormone therapy, talk to your doctor about whether they are triggering your migraines. When should you call for help? Call 911 anytime you think you may need emergency care. For example, call if:    · You have symptoms of a stroke. These may include:  ? Sudden numbness, tingling, weakness, or loss of movement in your face, arm, or leg, especially on only one side of your body. ? Sudden vision changes. ? Sudden trouble speaking. ? Sudden confusion or trouble understanding simple statements. ? Sudden problems with walking or balance. ? A sudden, severe headache that is different from past headaches. Call your doctor now or seek immediate medical care if:    · You develop a fever and a stiff neck.     · You have new nausea and vomiting, or you cannot keep down food or liquids. Watch closely for changes in your health, and be sure to contact your doctor if:    · You have a headache that does not get better within 1 or 2 days.     · Your headaches get worse or happen more often. Where can you learn more? Go to https://Dark Fibre Africapebereniceeweb.Exclusively.in. org and sign in to your Flowboard account. Enter  in the [employer] box to learn more about \"Recurring Migraine Headache: Care Instructions. \"     If you do not have an account, please click on the \"Sign Up Now\" link. Current as of: April 8, 2021               Content Version: 13.1  © 2006-2021 Healthwise, Incorporated. Care instructions adapted under license by [facility] ([facility]). If you have questions about a medical condition or this instruction, always ask your healthcare professional. [place] [address] any warranty or liability for your use of this information.          Patient Education        Deciding About Taking Medicine to Prevent Migraines  How can you decide about taking medicine to prevent migraine headaches? What are migraines? Migraines are painful, throbbing headaches. They can last from 4 to 72 hours. They often occur on only one side of your head. But you may feel them on both sides. The pain may keep you from doing your daily activities. You may take a daily medicine if you get bad migraines often. This can help prevent them. What are key points about this decision? · Medicines to prevent migraines may not stop them every time. But if you take them daily, you can reduce how many migraines you get by more than half. They can also reduce how long migraines last. And your symptoms may not be as bad. · Medicines that prevent migraines may cause side effects. You may have sleep and memory problems, upset stomach, dry mouth, or constipation. Some of these side effects may last for as long as you take the medicine. Or they may go away within a few weeks. Why might you choose to take medicine to prevent migraines? · You are willing to take medicine daily if it will help your symptoms. · You don't think the side effects of the medicine could be as bad as your migraine symptoms. · Your migraines get in the way of your work. Or they harm your relationships with friends and family. · Benefits of medicine include fewer or no migraines. And your migraines may not last as long or feel as bad. Why might you choose not to take medicine to prevent migraines? · You want to avoid the side effects of the medicine. · You don't want to take medicine every day. · Your migraines are not affecting your work and relationships. · If your symptoms don't improve with home treatment and other medicines, you can decide later to take medicine every day to help prevent migraines. Your decision  Thinking about the facts and your feelings can help you make a decision that is right for you.  Be sure you understand the benefits and risks of your options, and think about what else you need to do before you make the decision. Where can you learn more? Go to https://chpepiceweb.[employer]. org and sign in to your Pigafe account. Enter B015 in the [facility] box to learn more about \"Deciding About Taking Medicine to Prevent Migraines. \"     If you do not have an account, please click on the \"Sign Up Now\" link. Current as of: April 8, 2021               Content Version: 13.1  © 6176-8977 Healthwise, Incorporated. Care instructions adapted under license by [facility] ([facility]). If you have questions about a medical condition or this instruction, always ask your healthcare professional. [address] any warranty or liability for your use of this information.

## 2022-03-02 NOTE — ASSESSMENT & PLAN NOTE
Will start with trial of rizotriptan. I told him to consider taking a prophylactic medication for migraines. We can discuss that at a future visit.

## 2022-03-02 NOTE — ASSESSMENT & PLAN NOTE
He has longstanding depression with psychotic features. I will adjust the Risperdal so he is taking 3 mg all at night. I will continue Effexor. I will also continue Wellbutrin. He did not need the Wellbutrin prescription today.

## 2022-03-02 NOTE — ASSESSMENT & PLAN NOTE
I verified that he has been taking Vyvanse 70 mg a day and also Adderall 20 mg twice a day on PDMP. I feel like this is a large total dose of stimulant. He states that it does not affect him though he does talk about having anxiety. He is willing to reduce the Adderall to 1 extra Adderall pill a day. He does work long shifts of 12 hours so I think it is reasonable to use Adderall 70 mg plus a 20 mg dose of Adderall though ideally I would prefer a lower dose. I sent refills dated approximately 30 days after his most recent prescription on PDMP. Of note is that he has a history of illicit methamphetamine use which may have increased his tolerance to this type of medication.

## 2022-03-02 NOTE — PROGRESS NOTES
3/2/22    Mable Lombardi  1980    Maia Nieto is a 37 y.o. male who presents today for evaluation of:  Chief Complaint   Patient presents with    New Patient    Medication Refill     ADD :duration whole life. Vyvanse 70 mg/d works well but does not last the whole day so he takes Adderall bid as well. He has some anxiety. He works long shifts. Per PDMP last rx 2/7/22 for 30 d. Depression : farily severe depression. He takes both wellbutrin and Effexor. Adding the Wellbutrin helps a little with anxiety. In past he got dizzy with BusSpar. No current SI. Recently got new job. HTN well controlled with lisinopril. Substance use disorder: He has been free of heroin for about 5 years. He stated that he has used methamphetamines more recently but has been off of them for a month. Migraine : long duration , gets them about 1-2 x/week. Currently not using any specific meds other than Excedrin. Insurance would not pay for a new medicine for migraines that his prior doctor prescribed. He states that he has not been prescribed any of the older medications for migraines. Review of Systems   Constitutional: Positive for fatigue. Negative for chills and fever. Respiratory: Positive for cough. Negative for shortness of breath. Cardiovascular: Negative for chest pain, palpitations and leg swelling. Gastrointestinal: Negative for abdominal pain, diarrhea and vomiting. Psychiatric/Behavioral: Positive for decreased concentration, dysphoric mood and hallucinations. Negative for suicidal ideas. The patient is nervous/anxious. No Known Allergies     OBJECTIVE    /84 (Site: Right Upper Arm, Position: Sitting, Cuff Size: Large Adult)   Pulse 120   Temp 97 °F (36.1 °C) (Infrared)   Ht 5' 9.75\" (1.772 m)   Wt 206 lb (93.4 kg)   SpO2 96%   BMI 29.77 kg/m²     Physical Exam   Constitutional:       General: Not in acute distress. Appearance: Normal appearance.  Not ill-appearing. Eyes:      General: No scleral icterus. Cardiovascular:      Rate and Rhythm: Normal rate and regular rhythm. Heart sounds: No murmur heard. No friction rub. No gallop. 2+ brenda PT pulses. Pulmonary:      Effort: Pulmonary effort is normal. No respiratory distress. Breath sounds: No wheezing, rhonchi or rales. Abdominal:      Palpations: Abdomen is soft. There is no mass. Tenderness: There is no abdominal tenderness. Musculoskeletal:     Moves all extremities normally. Skin:     General: Skin is warm. Coloration: Skin is not jaundiced. Neurological:      Mental Status: Patient is alert. NL sensation brenda legs. Psychiatric:         Behavior: Behavior normal.         Thought Content: Thought content normal.         Judgment: Judgment normal.      ASSESSMENT/PLAN:    1. Attention deficit hyperactivity disorder (ADHD), combined type  Assessment & Plan:   I verified that he has been taking Vyvanse 70 mg a day and also Adderall 20 mg twice a day on PDMP. I feel like this is a large total dose of stimulant. He states that it does not affect him though he does talk about having anxiety. He is willing to reduce the Adderall to 1 extra Adderall pill a day. He does work long shifts of 12 hours so I think it is reasonable to use Adderall 70 mg plus a 20 mg dose of Adderall though ideally I would prefer a lower dose. I sent refills dated approximately 30 days after his most recent prescription on PDMP. Of note is that he has a history of illicit methamphetamine use which may have increased his tolerance to this type of medication. Orders:  -     VYVANSE 70 MG capsule; Take 1 capsule by mouth every morning for 30 days. , Disp-30 capsule, R-0, DAWNormal  -     amphetamine-dextroamphetamine (ADDERALL) 20 MG tablet; Take 1 tablet by mouth daily for 30 days. , Disp-30 tablet, R-0Normal  2.  Severe episode of recurrent major depressive disorder, without psychotic features [facility])  Assessment & Plan:   He has longstanding depression with psychotic features. I will adjust the Risperdal so he is taking 3 mg all at night. I will continue Effexor. I will also continue Wellbutrin. He did not need the Wellbutrin prescription today. Orders:  -     desvenlafaxine succinate (PRISTIQ) 100 MG TB24 extended release tablet; Take 1 tablet by mouth daily, Disp-90 tablet, R-1Normal  -     risperiDONE (RISPERDAL) 3 MG tablet; Take 1 tablet by mouth nightly, Disp-30 tablet, R-2Normal  3. Primary hypertension  Assessment & Plan:   His blood pressure is well controlled with lisinopril and when needed help plan to prescribe it at the current dose. Orders:  -     CBC with Auto Differential  -     Comprehensive Metabolic Panel  4. Mild opioid use disorder, in sustained remission [facility])  Assessment & Plan:   He is done an excellent job being free of opioids since about 2017 or 16. He states he has used some methamphetamines since then but is currently clear of them. 5. Screening for hyperlipidemia  -     Lipid Panel  6. Screening for diabetes mellitus  -     Hemoglobin A1C  7. Migraine with aura and without status migrainosus, not intractable  Assessment & Plan: Will start with trial of rizotriptan. I told him to consider taking a prophylactic medication for migraines. We can discuss that at a future visit. Orders:  -     rizatriptan (MAXALT) 10 MG tablet; Take 1 tablet by mouth once as needed for Migraine May repeat in 2 hours if needed, Disp-12 tablet, R-5Normal      Return in about 4 weeks (around 3/30/2022) for ADD adn migraines and htn & mood.      Brent Villagran MD

## 2022-03-02 NOTE — ASSESSMENT & PLAN NOTE
He is done an excellent job being free of opioids since about 2017 or 16. He states he has used some methamphetamines since then but is currently clear of them.

## 2022-03-03 PROBLEM — E88.810 METABOLIC SYNDROME: Status: ACTIVE | Noted: 2022-03-03

## 2022-03-03 PROBLEM — R73.03 PREDIABETES: Status: ACTIVE | Noted: 2022-03-03

## 2022-03-03 PROBLEM — E88.81 METABOLIC SYNDROME: Status: ACTIVE | Noted: 2022-03-03

## 2022-03-03 LAB
A/G RATIO: 2.1 (ref 1.1–2.2)
ALBUMIN SERPL-MCNC: 4.5 G/DL (ref 3.4–5)
ALP BLD-CCNC: 87 U/L (ref 40–129)
ALT SERPL-CCNC: 30 U/L (ref 10–40)
ANION GAP SERPL CALCULATED.3IONS-SCNC: 13 MMOL/L (ref 3–16)
AST SERPL-CCNC: 21 U/L (ref 15–37)
BILIRUB SERPL-MCNC: <0.2 MG/DL (ref 0–1)
BUN BLDV-MCNC: 14 MG/DL (ref 7–20)
CALCIUM SERPL-MCNC: 9 MG/DL (ref 8.3–10.6)
CHLORIDE BLD-SCNC: 102 MMOL/L (ref 99–110)
CHOLESTEROL, TOTAL: 208 MG/DL (ref 0–199)
CO2: 23 MMOL/L (ref 21–32)
CREAT SERPL-MCNC: 1 MG/DL (ref 0.9–1.3)
ESTIMATED AVERAGE GLUCOSE: 122.6 MG/DL
GFR AFRICAN AMERICAN: >60
GFR NON-AFRICAN AMERICAN: >60
GLUCOSE BLD-MCNC: 138 MG/DL (ref 70–99)
HBA1C MFR BLD: 5.9 %
HDLC SERPL-MCNC: 38 MG/DL (ref 40–60)
LDL CHOLESTEROL CALCULATED: ABNORMAL MG/DL
LDL CHOLESTEROL DIRECT: 127 MG/DL
POTASSIUM SERPL-SCNC: 4 MMOL/L (ref 3.5–5.1)
SODIUM BLD-SCNC: 138 MMOL/L (ref 136–145)
TOTAL PROTEIN: 6.6 G/DL (ref 6.4–8.2)
TRIGL SERPL-MCNC: 320 MG/DL (ref 0–150)
VLDLC SERPL CALC-MCNC: ABNORMAL MG/DL

## 2022-03-03 RX ORDER — METFORMIN HYDROCHLORIDE 500 MG/1
500 TABLET, EXTENDED RELEASE ORAL
Qty: 90 TABLET | Refills: 3 | Status: SHIPPED | OUTPATIENT
Start: 2022-03-03 | End: 2022-10-05 | Stop reason: SDUPTHER

## 2022-04-04 ENCOUNTER — OFFICE VISIT (OUTPATIENT)
Dept: FAMILY MEDICINE CLINIC | Age: 42
End: 2022-04-04
Payer: MEDICARE

## 2022-04-04 VITALS
DIASTOLIC BLOOD PRESSURE: 70 MMHG | BODY MASS INDEX: 29.51 KG/M2 | SYSTOLIC BLOOD PRESSURE: 118 MMHG | HEIGHT: 69 IN | WEIGHT: 199.2 LBS | HEART RATE: 91 BPM | OXYGEN SATURATION: 98 %

## 2022-04-04 DIAGNOSIS — F90.2 ATTENTION DEFICIT HYPERACTIVITY DISORDER (ADHD), COMBINED TYPE: ICD-10-CM

## 2022-04-04 DIAGNOSIS — I10 PRIMARY HYPERTENSION: ICD-10-CM

## 2022-04-04 DIAGNOSIS — E88.81 METABOLIC SYNDROME: ICD-10-CM

## 2022-04-04 DIAGNOSIS — K21.9 GASTROESOPHAGEAL REFLUX DISEASE WITHOUT ESOPHAGITIS: ICD-10-CM

## 2022-04-04 DIAGNOSIS — S46.912A STRAIN OF LEFT ELBOW, INITIAL ENCOUNTER: ICD-10-CM

## 2022-04-04 DIAGNOSIS — F33.2 SEVERE EPISODE OF RECURRENT MAJOR DEPRESSIVE DISORDER, WITHOUT PSYCHOTIC FEATURES (HCC): ICD-10-CM

## 2022-04-04 DIAGNOSIS — Z00.00 ENCOUNTER FOR WELL ADULT EXAM WITHOUT ABNORMAL FINDINGS: Primary | ICD-10-CM

## 2022-04-04 PROCEDURE — 99213 OFFICE O/P EST LOW 20 MIN: CPT | Performed by: FAMILY MEDICINE

## 2022-04-04 PROCEDURE — 1036F TOBACCO NON-USER: CPT | Performed by: FAMILY MEDICINE

## 2022-04-04 PROCEDURE — G8427 DOCREV CUR MEDS BY ELIG CLIN: HCPCS | Performed by: FAMILY MEDICINE

## 2022-04-04 PROCEDURE — G8419 CALC BMI OUT NRM PARAM NOF/U: HCPCS | Performed by: FAMILY MEDICINE

## 2022-04-04 PROCEDURE — 99396 PREV VISIT EST AGE 40-64: CPT | Performed by: FAMILY MEDICINE

## 2022-04-04 RX ORDER — DEXTROAMPHETAMINE SACCHARATE, AMPHETAMINE ASPARTATE, DEXTROAMPHETAMINE SULFATE AND AMPHETAMINE SULFATE 5; 5; 5; 5 MG/1; MG/1; MG/1; MG/1
20 TABLET ORAL DAILY
Qty: 30 TABLET | Refills: 0 | Status: SHIPPED | OUTPATIENT
Start: 2022-05-07 | End: 2022-07-05 | Stop reason: SDUPTHER

## 2022-04-04 RX ORDER — DEXTROAMPHETAMINE SACCHARATE, AMPHETAMINE ASPARTATE, DEXTROAMPHETAMINE SULFATE AND AMPHETAMINE SULFATE 5; 5; 5; 5 MG/1; MG/1; MG/1; MG/1
20 TABLET ORAL DAILY
Qty: 30 TABLET | Refills: 0 | Status: SHIPPED | OUTPATIENT
Start: 2022-06-06 | End: 2022-07-05 | Stop reason: SDUPTHER

## 2022-04-04 RX ORDER — OMEPRAZOLE 20 MG/1
20 CAPSULE, DELAYED RELEASE ORAL DAILY
Qty: 90 CAPSULE | Refills: 2 | Status: SHIPPED | OUTPATIENT
Start: 2022-04-04 | End: 2022-10-05 | Stop reason: SDUPTHER

## 2022-04-04 RX ORDER — LISDEXAMFETAMINE DIMESYLATE 70 MG/1
70 CAPSULE ORAL EVERY MORNING
Qty: 30 CAPSULE | Refills: 0 | Status: SHIPPED | OUTPATIENT
Start: 2022-05-07 | End: 2022-07-05 | Stop reason: SDUPTHER

## 2022-04-04 RX ORDER — DEXTROAMPHETAMINE SACCHARATE, AMPHETAMINE ASPARTATE, DEXTROAMPHETAMINE SULFATE AND AMPHETAMINE SULFATE 5; 5; 5; 5 MG/1; MG/1; MG/1; MG/1
20 TABLET ORAL DAILY
Qty: 30 TABLET | Refills: 0 | Status: SHIPPED | OUTPATIENT
Start: 2022-04-07 | End: 2022-07-05 | Stop reason: SDUPTHER

## 2022-04-04 ASSESSMENT — ENCOUNTER SYMPTOMS
BACK PAIN: 0
VOMITING: 0
CONSTIPATION: 0
DIARRHEA: 0
APNEA: 0
SHORTNESS OF BREATH: 0
ABDOMINAL PAIN: 0
COUGH: 0
EYE PAIN: 0
TROUBLE SWALLOWING: 0
NAUSEA: 0

## 2022-04-04 NOTE — ASSESSMENT & PLAN NOTE
Not sure if tennis elbow or other muscle. Recommended stretcheing. Will rx diclofenac. Rec'd using other arm to the extent possible.

## 2022-04-04 NOTE — PATIENT INSTRUCTIONS
Advance Directives: Care Instructions  Overview  An advance directive is a legal way to state your wishes at the end of your life. It tells your family and your doctor what to do if you can't say what youwant. There are two main types of advance directives. You can change them any timeyour wishes change. Living will. This form tells your family and your doctor your wishes about life support and other treatment. The form is also called a declaration. Medical power of . This form lets you name a person to make treatment decisions for you when you can't speak for yourself. This person is called a health care agent (health care proxy, health care surrogate). The form is also called a durable power of  for health care. If you do not have an advance directive, decisions about your medical care maybe made by a family member, or by a doctor or a  who doesn't know you. It may help to think of an advance directive as a gift to the people who carefor you. If you have one, they won't have to make tough decisions by themselves. Follow-up care is a key part of your treatment and safety. Be sure to make and go to all appointments, and call your doctor if you are having problems. It's also a good idea to know your test results and keep alist of the medicines you take. What should you include in an advance directive? Many states have a unique advance directive form. (It may ask you to address specific issues.) Or you might use a universal form that's approved by manystates. If your form doesn't tell you what to address, it may be hard to know what to include in your advance directive. Use the questions below to help you getstarted.  Who do you want to make decisions about your medical care if you are not able to?  What life-support measures do you want if you have a serious illness that gets worse over time or can't be cured?  What are you most afraid of that might happen?  (Maybe you're less stress on your heart and blood vessels.  Within 12 hours, the level of carbon monoxide in your blood drops back to normal. That makes room for more oxygen. With more oxygen in your body, you may notice that you have more energy than when you smoked. After 2 weeks   Your lungs start to work better.  Your risk of heart attack starts to drop. After 1 month   When your lungs are clear, you cough less and breathe deeper, so it's easier to be active.  Your sense of taste and smell return. That means you can enjoy food more than you have since you started smoking. Over the years   Over the years, your risks of heart disease, heart attack, and stroke are lower.  After 10 years, your risk of dying from lung cancer is cut by about half. And your risk for many other types of cancer is lower too. How would quitting help others in your life? When you quit smoking, you improve the health of everyone who now breathes inyour smoke.  Their heart, lung, and cancer risks drop, much like yours.  They are sick less. For babies and small children, living smoke-free means they're less likely to have ear infections, pneumonia, and bronchitis.  If you're a woman who is or will be pregnant someday, quitting smoking means a healthier .  Children who are close to you are less likely to become adult smokers. Where can you learn more? Go to https://Kane Biotechkerwin.SCRM. org and sign in to your [employer] account. Enter [id number] [id number] [phone] in the [facility] box to learn more about \"Learning About Benefits From Quitting Smoking. \"     If you do not have an account, please click on the \"Sign Up Now\" link. Current as of: 2021               Content Version: 13.2  © 8301-5151 Healthwise, [employer]. Care instructions adapted under license by [facility] ([facility]).  If you have questions about a medical condition or this instruction, always ask your healthcare professional. Tapan Wright disclaims any warranty or liability for your use of this information. Well Visit, Ages 25 to 48: Care Instructions  Overview     Well visits can help you stay healthy. Your doctor has checked your overall health and may have suggested ways to take good care of yourself. Your doctor also may have recommended tests. At home, you can help prevent illness withhealthy eating, regular exercise, and other steps. Follow-up care is a key part of your treatment and safety. Be sure to make and go to all appointments, and call your doctor if you are having problems. It's also a good idea to know your test results and keep alist of the medicines you take. How can you care for yourself at home?  Get screening tests that you and your doctor decide on. Screening helps find diseases before any symptoms appear.  Eat healthy foods. Choose fruits, vegetables, whole grains, protein, and low-fat dairy foods. Limit fat, especially saturated fat. Reduce salt in your diet.  Limit alcohol. If you are a man, have no more than 2 drinks a day or 14 drinks a week. If you are a woman, have no more than 1 drink a day or 7 drinks a week.  Get at least 30 minutes of physical activity on most days of the week. Walking is a good choice. You also may want to do other activities, such as running, swimming, cycling, or playing tennis or team sports. Discuss any changes in your exercise program with your doctor.  Reach and stay at a healthy weight. This will lower your risk for many problems, such as obesity, diabetes, heart disease, and high blood pressure.  Do not smoke or allow others to smoke around you. If you need help quitting, talk to your doctor about stop-smoking programs and medicines. These can increase your chances of quitting for good.  Care for your mental health. It is easy to get weighed down by worry and stress.  Learn strategies to manage stress, like deep breathing and mindfulness, and stay connected with your instructions adapted under license by [facility] ([facility]). If you have questions about a medical condition or this instruction, always ask your healthcare professional. [address] any warranty or liability for your use of this information.

## 2022-04-04 NOTE — ASSESSMENT & PLAN NOTE
His ADD is very well controlled. I will put out refills for Vyvanse 70 mg daily and Adderall 20 mg daily in the afternoon for the upcoming 3 months. I checked PDMP and confirmed that it looks okay. It does appear that he got a prescription for #60 Adderall about a month and a half ago. His previous doctor had him taking Adderall twice a day. I reduce that to Adderall once a day.

## 2022-04-04 NOTE — PROGRESS NOTES
4/4/22    Rasheed Mikhail  1980  37 y.o. male     Adult Annual Preventive Visit  Chief Complaint   Patient presents with    1 Month Follow-Up   E/M : Pulled left arm in the lateral forearm area about a qeek ago. NO numb or tingling to left hand. The pain is worse if he makes a screwdriver turning type movement which with his left hand would be unscrewing the screw. ADD: Adderall is helping and is at a good dose and helps with organization. Takes Vyvanse in morning and Adderall about 4pm.     PreDM : tolerating metformin. Stopped pop. GERD : gets significant heartburn if does nto take omeprazole. Activity habits: Physical job with a lot of activity. Eating habits: Healthy    Sleep habits:  OK     Social support: Good    Mentation:   No or minimal trouble with memory, Learning new things and Brain seems to be working correctly    Review of Systems   Constitutional: Positive for fatigue. Negative for fever. HENT: Negative for nosebleeds and trouble swallowing. Eyes: Negative for pain and visual disturbance. Respiratory: Negative for apnea, cough and shortness of breath. Cardiovascular: Negative for chest pain and leg swelling. Gastrointestinal: Negative for abdominal pain, constipation, diarrhea, nausea and vomiting. Endocrine: Negative for cold intolerance, heat intolerance, polydipsia and polyuria. Genitourinary: Negative for difficulty urinating and hematuria. Musculoskeletal: Negative for arthralgias, back pain and gait problem. Skin: Negative for rash and wound. Allergic/Immunologic: Negative for food allergies and immunocompromised state. Neurological: Negative for speech difficulty, light-headedness and headaches. Hematological: Negative for adenopathy. Does not bruise/bleed easily. Psychiatric/Behavioral: Negative for decreased concentration and dysphoric mood. The patient is not nervous/anxious.         No Known Allergies     Current Outpatient Medications Medication Sig Dispense Refill    [START ON 4/7/2022] amphetamine-dextroamphetamine (ADDERALL) 20 MG tablet Take 1 tablet by mouth daily for 30 days. 30 tablet 0    omeprazole (PRILOSEC) 20 MG delayed release capsule Take 1 capsule by mouth Daily 90 capsule 2    [START ON 5/7/2022] VYVANSE 70 MG capsule Take 1 capsule by mouth every morning for 30 days. 30 capsule 0    [START ON 4/7/2022] lisdexamfetamine (VYVANSE) 70 MG capsule Take 1 capsule by mouth every morning for 30 days. 30 capsule 0    [START ON 6/6/2022] lisdexamfetamine (VYVANSE) 70 MG capsule Take 1 capsule by mouth every morning for 30 days. 30 capsule 0    [START ON 5/7/2022] amphetamine-dextroamphetamine (ADDERALL, 20MG,) 20 MG tablet Take 1 tablet by mouth daily for 30 days. 30 tablet 0    [START ON 6/6/2022] amphetamine-dextroamphetamine (ADDERALL, 20MG,) 20 MG tablet Take 1 tablet by mouth daily for 30 days.  30 tablet 0    diclofenac (VOLTAREN) 50 MG EC tablet Take 1 tablet by mouth 3 times daily as needed for Pain 90 tablet 1    metFORMIN (GLUCOPHAGE-XR) 500 MG extended release tablet Take 1 tablet by mouth daily (with breakfast) 90 tablet 3    cetirizine (ZYRTEC) 10 MG tablet TAKE 1 TABLET BY ORAL ROUTE EVERY DAY FOR ALLERGIC RHINITIS ORAL ONCE A DAY FOR 90 DAYS      buPROPion (WELLBUTRIN XL) 300 MG extended release tablet TAKE 1 TABLET BY MOUTH ONCE DAILY IN THE MORNING      lisinopril (PRINIVIL;ZESTRIL) 10 MG tablet TAKE 1 TABLET BY MOUTH ONCE DAILY      rOPINIRole (REQUIP) 0.5 MG tablet TAKE 1 TABLET BY MOUTH AT BEDTIME      desvenlafaxine succinate (PRISTIQ) 100 MG TB24 extended release tablet Take 1 tablet by mouth daily 90 tablet 1    risperiDONE (RISPERDAL) 3 MG tablet Take 1 tablet by mouth nightly 30 tablet 2    rizatriptan (MAXALT) 10 MG tablet Take 1 tablet by mouth once as needed for Migraine May repeat in 2 hours if needed 12 tablet 5    ondansetron (ZOFRAN ODT) 4 MG disintegrating tablet Take 1 tablet by mouth every 8 hours as needed for Nausea (Patient not taking: Reported on 3/2/2022) 15 tablet 0     No current facility-administered medications for this visit. OBJECTIVE    /70   Pulse 91   Ht 5' 9\" (1.753 m)   Wt 199 lb 3.2 oz (90.4 kg)   SpO2 98%   BMI 29.42 kg/m²     Physical Exam   Constitutional:       General: Not in acute distress. Appearance: Normal appearance. Not ill-appearing, toxic-appearing or diaphoretic. HENT:      Head: Normocephalic. Right Ear: Tympanic membrane, ear canal and external ear normal.      Left Ear: Tympanic membrane, ear canal and external ear normal.      Nose: No rhinorrhea. Mouth/Throat:      Mouth: Mucous membranes are moist.      Pharynx: Oropharynx is clear. No oropharyngeal exudate or posterior oropharyngeal erythema. Eyes:      General: No scleral icterus. Right eye: No discharge. Left eye: No discharge. Conjunctiva/sclera: Conjunctivae normal.   Neck:      Thyroid: No thyroid mass, thyromegaly or thyroid tenderness. Cardiovascular:      Rate and Rhythm: Normal rate and regular rhythm. Pulses:           Posterior tibial pulses are 2+ on the right side and 2+ on the left side. Heart sounds: Normal heart sounds. No murmur heard. No friction rub. No gallop. Pulmonary:      Effort: Pulmonary effort is normal. No respiratory distress. Breath sounds: Normal breath sounds. No stridor. No wheezing, rhonchi or rales. Abdominal:      General: There is no distension. Palpations: Abdomen is soft. Tenderness: There is no abdominal tenderness. There is no guarding. Musculoskeletal:         General: No deformity. Cervical back: No rigidity. Right lower leg: No edema. Left lower leg: No edema. He has no tenderness over the wrist extensor muscle group nor near the medial epicondyle of the left arm. The tenderness is in between. Lymphadenopathy:      Cervical: No cervical adenopathy. Right upper body: No supraclavicular or axillary adenopathy. Left upper body: No supraclavicular or axillary adenopathy. Lower Body: No right inguinal adenopathy. No left inguinal adenopathy. Skin:     General: Skin is warm. Coloration: Skin is not jaundiced. Neurological:      Mental Status: She is alert. Deep Tendon Reflexes:      Reflex Scores:       Patellar reflexes are 2+ on the right side and 2+ on the left side. Psychiatric:         Attention and Perception: Attention and perception normal.         Mood and Affect: Mood normal.         Speech: Speech normal.         Behavior: Behavior normal.         Thought Content: Thought content normal.      ASSESSMENT AND PLAN    1. Encounter for well adult exam without abnormal findings  2. Metabolic syndrome  Assessment & Plan:   Encouraging exercise and healthy living. He is taking Metformin. 3. Primary hypertension  Assessment & Plan:   His blood pressure is well controlled. Medication is lisinopril 10 mg daily. I will put out refills when needed. 4. Severe episode of recurrent major depressive disorder, without psychotic features ([facility] [address]  Assessment & Plan:   His depression is well controlled with bupropion  mg daily and with desvenlafaxine. 5. Gastroesophageal reflux disease without esophagitis  -     omeprazole (PRILOSEC) 20 MG delayed release capsule; Take 1 capsule by mouth Daily, Disp-90 capsule, R-2Normal  6. Attention deficit hyperactivity disorder (ADHD), combined type  Assessment & Plan:   His ADD is very well controlled. I will put out refills for Vyvanse 70 mg daily and Adderall 20 mg daily in the afternoon for the upcoming 3 months. I checked PDMP and confirmed that it looks okay. It does appear that he got a prescription for #60 Adderall about a month and a half ago. His previous doctor had him taking Adderall twice a day. I reduce that to Adderall once a day.   Orders:  -     amphetamine-dextroamphetamine (ADDERALL) 20 MG tablet; Take 1 tablet by mouth daily for 30 days. , Disp-30 tablet, R-0Normal  -     VYVANSE 70 MG capsule; Take 1 capsule by mouth every morning for 30 days. , Disp-30 capsule, R-0, DAWNormal  -     lisdexamfetamine (VYVANSE) 70 MG capsule; Take 1 capsule by mouth every morning for 30 days. , Disp-30 capsule, R-0Normal  -     lisdexamfetamine (VYVANSE) 70 MG capsule; Take 1 capsule by mouth every morning for 30 days. , Disp-30 capsule, R-0Normal  -     amphetamine-dextroamphetamine (ADDERALL, 20MG,) 20 MG tablet; Take 1 tablet by mouth daily for 30 days. , Disp-30 tablet, R-0Normal  -     amphetamine-dextroamphetamine (ADDERALL, 20MG,) 20 MG tablet; Take 1 tablet by mouth daily for 30 days. , Disp-30 tablet, R-0Normal  7. Strain of left elbow, initial encounter  Assessment & Plan:   Not sure if tennis elbow or other muscle. Recommended stretcheing. Will rx diclofenac. Rec'd using other arm to the extent possible. Orders:  -     diclofenac (VOLTAREN) 50 MG EC tablet; Take 1 tablet by mouth 3 times daily as needed for Pain, Disp-90 tablet, R-1Normal        Counseling provided for:  Healthy eating - Avoid sugar and other refined carbohydrates. and Eat more foods with fiber like vegetables and whole grain products.   >> Exercise - 1> try to do 150 minutes a week of exercise that is as hard as walking briskly (30 minutes 5 days a week or 22 minutes every day); and 2> do some strength training 2 or 3 times a week. Social support - Keep socially involved. This will help with keeping your brain working well (avoiding dementia) as you get older and also help you to be happier. and Mentally activity - Keep trying to learn new things, reading, following sports/fashion/whatever you like, and other mental things to keep your brain working well and avoid dementia. Return in about 3 months (around 7/4/2022) for ADD, mood.      Selene Luis MD

## 2022-04-06 DIAGNOSIS — F90.2 ATTENTION DEFICIT HYPERACTIVITY DISORDER (ADHD), COMBINED TYPE: ICD-10-CM

## 2022-04-06 RX ORDER — LISDEXAMFETAMINE DIMESYLATE 70 MG/1
70 CAPSULE ORAL EVERY MORNING
Qty: 30 CAPSULE | Refills: 0 | OUTPATIENT
Start: 2022-05-07 | End: 2022-06-06

## 2022-04-06 NOTE — TELEPHONE ENCOUNTER
Patient left message that there is not a prescription for Vyvanse 70 mg at the pharmacy for this month.

## 2022-04-26 RX ORDER — BUPROPION HYDROCHLORIDE 300 MG/1
TABLET ORAL
Qty: 30 TABLET | Refills: 1 | Status: SHIPPED | OUTPATIENT
Start: 2022-04-26 | End: 2022-06-24 | Stop reason: SDUPTHER

## 2022-05-12 RX ORDER — LISINOPRIL 10 MG/1
TABLET ORAL
Qty: 90 TABLET | Refills: 1 | Status: SHIPPED | OUTPATIENT
Start: 2022-05-12 | End: 2022-10-05 | Stop reason: SDUPTHER

## 2022-05-17 ENCOUNTER — OFFICE VISIT (OUTPATIENT)
Dept: FAMILY MEDICINE CLINIC | Age: 42
End: 2022-05-17
Payer: MEDICARE

## 2022-05-17 VITALS
OXYGEN SATURATION: 98 % | DIASTOLIC BLOOD PRESSURE: 76 MMHG | HEIGHT: 69 IN | TEMPERATURE: 96.6 F | HEART RATE: 111 BPM | WEIGHT: 178.4 LBS | SYSTOLIC BLOOD PRESSURE: 120 MMHG | BODY MASS INDEX: 26.42 KG/M2

## 2022-05-17 DIAGNOSIS — S46.912D STRAIN OF LEFT ELBOW, SUBSEQUENT ENCOUNTER: ICD-10-CM

## 2022-05-17 DIAGNOSIS — S91.209A AVULSION OF TOENAIL, INITIAL ENCOUNTER: Primary | ICD-10-CM

## 2022-05-17 PROCEDURE — G8419 CALC BMI OUT NRM PARAM NOF/U: HCPCS | Performed by: FAMILY MEDICINE

## 2022-05-17 PROCEDURE — G8427 DOCREV CUR MEDS BY ELIG CLIN: HCPCS | Performed by: FAMILY MEDICINE

## 2022-05-17 PROCEDURE — 99213 OFFICE O/P EST LOW 20 MIN: CPT | Performed by: FAMILY MEDICINE

## 2022-05-17 PROCEDURE — 1036F TOBACCO NON-USER: CPT | Performed by: FAMILY MEDICINE

## 2022-05-17 NOTE — PROGRESS NOTES
5/17/22    Dom Molina  1980    SUBJECTIVE    HPI - Arelis Morillo is a 37 y.o. male who presents today for evaluation of:  Chief Complaint   Patient presents with    Other     infected big toe nail fell off     Great toenail avulsion, Left grt toe. A few weeks ago something fell on the toe. The nail just recently came off. He is treating with topical neosporin. Pain was bad but is getting better. Review of Systems    No Known Allergies     OBJECTIVE    /76 (Site: Right Upper Arm, Position: Sitting, Cuff Size: Large Adult)   Pulse 111   Temp 96.6 °F (35.9 °C) (Infrared)   Ht 5' 9\" (1.753 m)   Wt 178 lb 6.4 oz (80.9 kg)   SpO2 98%   BMI 26.35 kg/m²     Physical Exam   Constitutional:       General: Not in acute distress. Appearance: Normal appearance. Not ill-appearing. Eyes:      General: No scleral icterus. Cardiovascular:      Normal Left PT and DP pulses. Pulmonary:      Effort: Pulmonary effort is normal. No respiratory distress. Musculoskeletal:     Moves all extremities normally. No TTP left med or lat epicondyule. No olecranon swelling. Pain is mostly in the bulky part of muscle of left lateral forearm. Skin:     General: Skin is warm. Coloration: Skin is not jaundiced. The left great toenail has been recently a avulsed. There is some surrounding erythema but not enough to appear to be a cellulitis. There is no pus apparent. There are no proximal red streaks or evidence of cellulitis. Neurological:      Mental Status: Patient is alert. Psychiatric:         Behavior: Behavior normal.         Thought Content: Thought content normal.         Judgment: Judgment normal.      ASSESSMENT/PLAN:    1. Avulsion of toenail, initial encounter  2. Strain of left elbow, subsequent encounter  -     Mateo Michael [address]  I reassured with respect to the avulsion.   I will refer to physical therapy for assistance with recovering from a left arm muscle or tendon strain. It is safe to take acetaminophen up to a total dose of 3000 mg spread through the day. Be sure to include acetaminophen in all products since acetaminophen can be in cold preparations and in some opioid pain medications. Or use nonprescription ibuprofen. No follow-ups on file.      Brielle Mathias MD

## 2022-05-17 NOTE — PATIENT INSTRUCTIONS
Patient Education        Toenail or Fingernail Avulsion: Care Instructions  Your Care Instructions  Losing a toenail or fingernail because of an injury is called avulsion. Thenail may be completely or partially torn off after a trauma to the area. Your doctor may have removed the nail, put part of it back into place, or repaired the nail bed. Your toe or finger may be sore after treatment. You mayhave stitches. You may have some swelling, color changes, and bloody crusting on or around the wound for 2 or 3 days. This is normal. Taking good care of your wound at homewill help it heal quickly and reduce your chance of infection. The wound should heal within a few weeks. If completely removed, fingernails may take 6 months to grow back. Toenails may take 12 to 18 months to grow back. Injured nails may look different when they grow back. Follow-up care is a key part of your treatment and safety. Be sure to make and go to all appointments, and call your doctor if you are having problems. It's also a good idea to know your test results and keep alist of the medicines you take. How can you care for yourself at home?  If possible, prop up the injured area on a pillow anytime you sit or lie down during the next 3 days. Try to keep it above the level of your heart. This will help reduce swelling.  Leave the bandage on, and if you have stitches, do not get them wet for the first 24 to 48 hours. Use a plastic bag to cover the area when you shower.  If your doctor told you how to care for your wound, follow your doctor's instructions. If you did not get instructions, follow this general advice:  ? After the first 24 to 48 hours, you can remove the bandage and gently wash around the wound with clean water 2 times a day. If the bandage sticks to the wound, use warm water to loosen it. Do not scrub or soak the area. ?  You may cover the wound with a thin layer of petroleum jelly, such as Vaseline, and a nonstick bandage. ? Apply more petroleum jelly and replace the bandage as needed.  Do not go swimming.  If you have stitches, do not remove them on your own. Your doctor will tell you when to return to have the stitches removed.  Be safe with medicines. Take pain medicines exactly as directed. ? If the doctor gave you a prescription medicine for pain, take it as prescribed. ? If you are not taking a prescription pain medicine, ask your doctor if you can take an over-the-counter medicine.  If your doctor prescribed antibiotics, take them as directed. Do not stop taking them just because you feel better. You need to take the full course of antibiotics. When should you call for help? Call your doctor now or seek immediate medical care if:     The skin near the wound is cool or pale or changes color.      The wound starts to bleed, and blood soaks through the bandage. Oozing small amounts of blood is normal.      You have signs of infection, such as:  ? Increased pain, swelling, warmth, or redness. ? Red streaks leading from your toe or finger. ? Pus draining from your toe or finger. ? Swollen lymph nodes in your neck, armpits, or groin. ? A fever. Watch closely for changes in your health, and be sure to contact your doctor if:     You have problems with the nail as it grows back.      You do not get better as expected. Where can you learn more? Go to [email or web address]://[email or web address]. org and sign in to your [employer] account. Enter E117 in the [facility] box to learn more about \"Toenail or Fingernail Avulsion: Care Instructions. \"     If you do not have an account, please click on the \"Sign Up Now\" link. Current as of: November 15, 2021               Content Version: 13.2  © 1246-4386 [employer]. Care instructions adapted under license by [facility] ([facility]).  If you have questions about a medical condition or this instruction, always ask your healthcare professional. [employer] disclaims any warranty or liability for your use of this information.

## 2022-05-26 DIAGNOSIS — F33.2 SEVERE EPISODE OF RECURRENT MAJOR DEPRESSIVE DISORDER, WITHOUT PSYCHOTIC FEATURES (HCC): ICD-10-CM

## 2022-05-26 RX ORDER — RISPERIDONE 3 MG/1
3 TABLET, FILM COATED ORAL NIGHTLY
Qty: 30 TABLET | Refills: 0 | OUTPATIENT
Start: 2022-05-26

## 2022-05-27 ENCOUNTER — OFFICE VISIT (OUTPATIENT)
Dept: ORTHOPEDIC SURGERY | Age: 42
End: 2022-05-27

## 2022-05-27 VITALS
HEART RATE: 105 BPM | SYSTOLIC BLOOD PRESSURE: 138 MMHG | OXYGEN SATURATION: 98 % | WEIGHT: 185.9 LBS | DIASTOLIC BLOOD PRESSURE: 89 MMHG | RESPIRATION RATE: 16 BRPM | HEIGHT: 69 IN | BODY MASS INDEX: 27.53 KG/M2

## 2022-05-27 DIAGNOSIS — S46.912A ELBOW STRAIN, LEFT, INITIAL ENCOUNTER: Primary | ICD-10-CM

## 2022-05-27 DIAGNOSIS — F33.2 SEVERE EPISODE OF RECURRENT MAJOR DEPRESSIVE DISORDER, WITHOUT PSYCHOTIC FEATURES (HCC): ICD-10-CM

## 2022-05-27 PROCEDURE — 99203 OFFICE O/P NEW LOW 30 MIN: CPT

## 2022-05-27 RX ORDER — RISPERIDONE 3 MG/1
3 TABLET, FILM COATED ORAL NIGHTLY
Qty: 30 TABLET | Refills: 2 | Status: SHIPPED | OUTPATIENT
Start: 2022-05-27 | End: 2022-09-11 | Stop reason: SDUPTHER

## 2022-05-27 RX ORDER — RISPERIDONE 3 MG/1
3 TABLET, FILM COATED ORAL NIGHTLY
Qty: 30 TABLET | Refills: 2 | OUTPATIENT
Start: 2022-05-27

## 2022-05-27 NOTE — PROGRESS NOTES
Shasta Ricardo is a 37 y.o. male presenting to the office with a possible left elbow strain. Pt states he has pain in the proximal forearm and elbow that is constant and aching in nature with an onset of 2 weeks. Pt states that he will have increased pain with twisting motions that will radiate down to his wrist. Pt states that he has tried treating the pain with OTC pain relievers and topical analgesics with little to no relief. 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

## 2022-06-03 ASSESSMENT — ENCOUNTER SYMPTOMS
NAUSEA: 0
SHORTNESS OF BREATH: 0
RHINORRHEA: 0
COUGH: 0
BACK PAIN: 0
FACIAL SWELLING: 0

## 2022-06-03 NOTE — PROGRESS NOTES
5/27/2022   Chief Complaint   Patient presents with    Elbow Injury     left        History of Present Illness:                             Keo Tang is a 37 y.o. male presenting the office today as a new patient with a left elbow sprain. Patient states that he works in a [address] and has noticed an increase in pain in the last 2 weeks around his elbow and proximal forearm. Patient states that it is constant and aching and is inhibiting his ability to perform the tasks of his job. Patient states that he has tried ibuprofen and Tylenol as well as pain reliever creams with little to no relief. Patient states he did not have any acute injury or previous accident or surgery to the left arm. Patient states his pain is worse with lifting heavy objects and bending of his elbow. Patient denies any pain at the shoulder or wrist or any paresthesias or numbness into the fingers. Gena Yang is a 37 y.o. male presenting to the office with a possible left elbow strain. Pt states he has pain in the proximal forearm and elbow that is constant and aching in nature with an onset of 2 weeks. Pt states that he will have increased pain with twisting motions that will radiate down to his wrist. Pt states that he has tried treating the pain with OTC pain relievers and topical analgesics with little to no relief. 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 Medical History  Patient's medications, allergies, past medical, surgical, social and family histories were reviewed and updated as appropriate.     Past Medical History:   Diagnosis Date    Abscess 12/8/2012    left forearm    Cat bite involving extremity 12/22/2011    Cellulitis of antecubital fossa 12/22/2011    Drug addiction ([facility] [address]     heroin    Metabolic syndrome 1/4/1140    See lipid panel 3/2/22    Migraine with aura and without status migrainosus, not intractable 3/2/2022  Prediabetes 3/3/2022    Primary hypertension 3/2/2022    Severe episode of recurrent major depressive disorder, without psychotic features ([facility] [address] 3/2/2022     Past Surgical History:   Procedure Laterality Date    DENTAL SURGERY  14     No family history on file. Social History     Socioeconomic History    Marital status: Single     Spouse name: Not on file    Number of children: Not on file    Years of education: Not on file    Highest education level: Not on file   Occupational History    Not on file   Tobacco Use    Smoking status: Former Smoker     Packs/day: 1.00     Types: Cigarettes     Quit date:      Years since quittin.4    Smokeless tobacco: Never Used   Vaping Use    Vaping Use: Every day    Substances: Nicotine   Substance and Sexual Activity    Alcohol use: No    Drug use: Not Currently     Types: Methamphetamines (Crystal Meth)     Comment: snort and smoke     Sexual activity: Not on file   Other Topics Concern    Not on file   Social History Narrative    Not on file     Social Determinants of Health     Financial Resource Strain: High Risk    Difficulty of Paying Living Expenses: Hard   Food Insecurity: Food Insecurity Present    Worried About Running Out of Food in the Last Year: Sometimes true    Melodie of Food in the Last Year: Sometimes true   Transportation Needs:     Lack of Transportation (Medical): Not on file    Lack of Transportation (Non-Medical):  Not on file   Physical Activity:     Days of Exercise per Week: Not on file    Minutes of Exercise per Session: Not on file   Stress:     Feeling of Stress : Not on file   Social Connections:     Frequency of Communication with Friends and Family: Not on file    Frequency of Social Gatherings with Friends and Family: Not on file    Attends [employer] Services: Not on file    Active Member of Clubs or Organizations: Not on file    Attends Club or Organization Meetings: Not on file    Marital Status: Not on file   Intimate Partner Violence:     Fear of Current or Ex-Partner: Not on file    Emotionally Abused: Not on file    Physically Abused: Not on file    Sexually Abused: Not on file   Housing Stability:     Unable to Pay for Housing in the Last Year: Not on file    Number of [place] in the Last Year: Not on file    Unstable Housing in the Last Year: Not on file     Current Outpatient Medications   Medication Sig Dispense Refill    lisinopril (PRINIVIL;ZESTRIL) 10 MG tablet TAKE 1 TABLET BY MOUTH ONCE DAILY 90 tablet 1    buPROPion (WELLBUTRIN XL) 300 MG extended release tablet TAKE 1 TABLET BY MOUTH ONCE DAILY IN THE MORNING 30 tablet 1    omeprazole (PRILOSEC) 20 MG delayed release capsule Take 1 capsule by mouth Daily 90 capsule 2    VYVANSE 70 MG capsule Take 1 capsule by mouth every morning for 30 days. 30 capsule 0    [START ON 6/6/2022] lisdexamfetamine (VYVANSE) 70 MG capsule Take 1 capsule by mouth every morning for 30 days. 30 capsule 0    amphetamine-dextroamphetamine (ADDERALL, 20MG,) 20 MG tablet Take 1 tablet by mouth daily for 30 days. 30 tablet 0    [START ON 6/6/2022] amphetamine-dextroamphetamine (ADDERALL, 20MG,) 20 MG tablet Take 1 tablet by mouth daily for 30 days.  30 tablet 0    diclofenac (VOLTAREN) 50 MG EC tablet Take 1 tablet by mouth 3 times daily as needed for Pain 90 tablet 1    metFORMIN (GLUCOPHAGE-XR) 500 MG extended release tablet Take 1 tablet by mouth daily (with breakfast) 90 tablet 3    cetirizine (ZYRTEC) 10 MG tablet TAKE 1 TABLET BY ORAL ROUTE EVERY DAY FOR ALLERGIC RHINITIS ORAL ONCE A DAY FOR 90 DAYS      rOPINIRole (REQUIP) 0.5 MG tablet TAKE 1 TABLET BY MOUTH AT BEDTIME      desvenlafaxine succinate (PRISTIQ) 100 MG TB24 extended release tablet Take 1 tablet by mouth daily 90 tablet 1    risperiDONE (RISPERDAL) 3 MG tablet Take 1 tablet by mouth nightly 30 tablet 2    amphetamine-dextroamphetamine (ADDERALL) 20 MG tablet Take 1 tablet by mouth daily for 30 days. 30 tablet 0    lisdexamfetamine (VYVANSE) 70 MG capsule Take 1 capsule by mouth every morning for 30 days. 30 capsule 0    rizatriptan (MAXALT) 10 MG tablet Take 1 tablet by mouth once as needed for Migraine May repeat in 2 hours if needed 12 tablet 5     No current facility-administered medications for this visit. No Known Allergies      Review of Systems   Constitutional: Negative for fever. HENT: Negative for facial swelling and rhinorrhea. Respiratory: Negative for cough and shortness of breath. Cardiovascular: Negative for chest pain. Gastrointestinal: Negative for nausea. Musculoskeletal: Positive for arthralgias. Negative for back pain, gait problem, joint swelling, myalgias and neck pain. Skin: Negative for pallor and rash. Neurological: Negative for facial asymmetry and speech difficulty. Psychiatric/Behavioral: Negative for agitation and confusion. Examination:  General Exam:  Vitals: /89   Pulse (!) 105   Resp 16   Ht 5' 9\" (1.753 m)   Wt 185 lb 14.4 oz (84.3 kg)   SpO2 98%   BMI 27.45 kg/m²    Physical Exam  Constitutional:       General: He is not in acute distress. Appearance: Normal appearance. He is normal weight. He is not ill-appearing. HENT:      Head: Normocephalic and atraumatic. Nose: No rhinorrhea. Eyes:      General: No scleral icterus. Right eye: No discharge. Left eye: No discharge. Cardiovascular:      Pulses: Normal pulses. Pulmonary:      Effort: Pulmonary effort is normal. No respiratory distress. Breath sounds: No stridor. Musculoskeletal:      Left shoulder: Normal.      Left elbow: No swelling, deformity, effusion or lacerations. Normal range of motion. Tenderness present in olecranon process. Left wrist: Normal.      Cervical back: Normal range of motion.       Comments: Left upper extremity exam: Patient left shoulder remains without complication and patient can produce full active range of motion without pain at the shoulder. Patient maintains full active range of motion and function at the wrist and hand. Phalen sign negative. Phalen sign at the elbow negative. There is no reproducible paresthesias with pressure on the ulnar nerve at the elbow. Patient maintains full active range of motion in flexion and extension of the elbow as well as supination and pronation. Patient has some discomfort of the medial posterior and lateral aspects of the proximal elbow with supination and pronation of the forearm against resistance. His tenderness also extends into the proximal portion of the anterior forearm. Strength 5 out of 5 right compared to left with passive manipulation of the elbow and supination pronation of the forearm. No obvious point tenderness on the lateral or medial epicondyles of the elbow. Radial pulse 2+, brisk capillary refill. Skin:     General: Skin is warm. Capillary Refill: Capillary refill takes less than 2 seconds. Coloration: Skin is not jaundiced or pale. Neurological:      General: No focal deficit present. Mental Status: He is alert and oriented to person, place, and time. Psychiatric:         Mood and Affect: Mood normal.         Behavior: Behavior normal.            Diagnostic testing:  X-ray images were reviewed by myself and discussed with the patient: In my opinion there is no acute osseous abnormality at the elbow. Impression   No acute osseous abnormality.           Office Procedures:  Orders Placed This Encounter   Procedures    MRI HUMERUS LEFT WO CONTRAST     Order Specific Question:   Reason for exam:     Answer:   possible bicep tear    MRI RADIUS ULNA LEFT WO CONTRAST     Order Specific Question:   Reason for exam:     Answer:   possible bicep tear       Assessment and Plan  1.   Right elbow sprain  -Explained to the patient that there was not any acute bone deformity and the x-ray and that his arm appears to have adequate blood flow and nerve function. With the bones, vasculature, and nerve etiologies being low on the differential, I explained to the patient that it is reasonable to look deeper into a soft tissue cause of his pain. Therefore, I believe we should proceed with an MRI of his elbow and forearm. I have a moderate degree of suspicion that the patient has an injury to his bicep tendon insertion points on the forearm. The patient is agreeable to this plan. I explained to him that when the MRI results are returned we can advise on a more concrete plan for treatment.  -Patient can continue utilizing conservative measures for pain relief and swelling reduction such as rest, ice, compression, elevation, and over-the-counter pain medication.  -Patient referred for MRI study of the left elbow.  -Patient scheduled for follow-up when his MRI is completed.     Electronically signed by Jaylen Garcia PA-C on 6/3/2022 at 11:07 AM

## 2022-06-16 ENCOUNTER — HOSPITAL ENCOUNTER (OUTPATIENT)
Dept: GENERAL RADIOLOGY | Age: 42
Discharge: HOME | End: 2022-06-16
Payer: MEDICARE

## 2022-06-16 ENCOUNTER — HOSPITAL ENCOUNTER (OUTPATIENT)
Dept: MRI IMAGING | Age: 42
Discharge: HOME | End: 2022-06-16
Payer: MEDICARE

## 2022-06-16 DIAGNOSIS — S46.912A ELBOW STRAIN, LEFT, INITIAL ENCOUNTER: ICD-10-CM

## 2022-06-16 DIAGNOSIS — M79.5 FOREIGN BODY (FB) IN SOFT TISSUE: ICD-10-CM

## 2022-06-16 PROCEDURE — 73218 MRI UPPER EXTREMITY W/O DYE: CPT

## 2022-06-16 PROCEDURE — 73060 X-RAY EXAM OF HUMERUS: CPT

## 2022-06-21 ENCOUNTER — OFFICE VISIT (OUTPATIENT)
Dept: ORTHOPEDIC SURGERY | Age: 42
End: 2022-06-21
Payer: MEDICARE

## 2022-06-21 VITALS
HEIGHT: 69 IN | OXYGEN SATURATION: 97 % | HEART RATE: 103 BPM | RESPIRATION RATE: 16 BRPM | BODY MASS INDEX: 27.4 KG/M2 | SYSTOLIC BLOOD PRESSURE: 139 MMHG | WEIGHT: 185 LBS | DIASTOLIC BLOOD PRESSURE: 84 MMHG

## 2022-06-21 DIAGNOSIS — S46.209A INJURY OF TENDON OF BICEPS: Primary | ICD-10-CM

## 2022-06-21 PROCEDURE — 99212 OFFICE O/P EST SF 10 MIN: CPT

## 2022-06-21 NOTE — PROGRESS NOTES
Impression   Grade 1 triceps muscle strain, measuring approximately 8 cm in craniocaudal   dimension.       This study does not adequately assess the elbow joint.  Within this   limitation, there is moderate distal insertional biceps tendinosis.  In   addition, there is fluid bright signal in the region of the distal biceps   tendon.  This could represent a distended bicipitoradial bursa; however,   cannot exclude the possibility of a biceps tendon tear.  This could be better   assessed on a dedicated elbow MRI.       RECOMMENDATIONS:   Unavailable

## 2022-06-23 ENCOUNTER — OFFICE VISIT (OUTPATIENT)
Dept: ORTHOPEDIC SURGERY | Age: 42
End: 2022-06-23
Payer: MEDICARE

## 2022-06-23 VITALS
HEART RATE: 101 BPM | DIASTOLIC BLOOD PRESSURE: 88 MMHG | BODY MASS INDEX: 27.92 KG/M2 | WEIGHT: 195 LBS | SYSTOLIC BLOOD PRESSURE: 137 MMHG | HEIGHT: 70 IN | RESPIRATION RATE: 19 BRPM | OXYGEN SATURATION: 98 %

## 2022-06-23 DIAGNOSIS — S56.912A STRAIN OF LEFT FOREARM, INITIAL ENCOUNTER: Primary | ICD-10-CM

## 2022-06-23 PROCEDURE — 99214 OFFICE O/P EST MOD 30 MIN: CPT | Performed by: ORTHOPAEDIC SURGERY

## 2022-06-23 PROCEDURE — G8427 DOCREV CUR MEDS BY ELIG CLIN: HCPCS | Performed by: ORTHOPAEDIC SURGERY

## 2022-06-23 PROCEDURE — 1036F TOBACCO NON-USER: CPT | Performed by: ORTHOPAEDIC SURGERY

## 2022-06-23 PROCEDURE — G8419 CALC BMI OUT NRM PARAM NOF/U: HCPCS | Performed by: ORTHOPAEDIC SURGERY

## 2022-06-23 NOTE — PATIENT INSTRUCTIONS
Continue weight-bearing as tolerated. Continue range of motion exercises as instructed. Ice and elevate as needed. Tylenol or Motrin for pain.   Physical therapy ordered today  Follow up in 6 weeks

## 2022-06-23 NOTE — PROGRESS NOTES
Patient returns to the office with elbow and arm pain. Pt stated that his pain is distal to his elbow and will radiate to his wrist. Pt stated this was onset about 3 months ago when he was lifting a box overhead that was very heavy. Pt stated that the pain will increase with rotation or weighted movement. Pt has been taking ibuprofen as needed for the pain.      MRI completed: 6/16/22  Impression   Grade 1 triceps muscle strain, measuring approximately 8 cm in craniocaudal   dimension.       This study does not adequately assess the elbow joint.  Within this   limitation, there is moderate distal insertional biceps tendinosis.  In   addition, there is fluid bright signal in the region of the distal biceps   tendon.  This could represent a distended bicipitoradial bursa; however,   cannot exclude the possibility of a biceps tendon tear.  This could be better   assessed on a dedicated elbow MRI.

## 2022-06-23 NOTE — PROGRESS NOTES
6/23/2022   Chief Complaint   Patient presents with    Elbow Pain     Left        History of Present Illness:                             Buster Navarro is a 37 y.o. male who returns today for follow-up of his left elbow. He continues to have discomfort at the volar aspect of his antecubital fossa and proximal forearm predominantly on the ulnar aspect. He has continued to be able to do light activities but does have sharp pains occasionally with heavy lifting or forearm rotational movements. He recently had his MRI is here today for a review of the results. Patient returns to the office with elbow and arm pain. Pt stated that his pain is distal to his elbow and will radiate to his wrist. Pt stated this was onset about 3 months ago when he was lifting a box overhead that was very heavy. Pt stated that the pain will increase with rotation or weighted movement. Pt has been taking ibuprofen as needed for the pain. Medical History  Patient's medications, allergies, past medical, surgical, social and family histories were reviewed and updated as appropriate. Past Medical History:   Diagnosis Date    Abscess 12/8/2012    left forearm    Cat bite involving extremity 12/22/2011    Cellulitis of antecubital fossa 12/22/2011    Drug addiction ([facility] [address]     heroin    Metabolic syndrome 1/1/8996    See lipid panel 3/2/22    Migraine with aura and without status migrainosus, not intractable 3/2/2022    Prediabetes 3/3/2022    Primary hypertension 3/2/2022    Severe episode of recurrent major depressive disorder, without psychotic features ([facility] [address] 3/2/2022     Past Surgical History:   Procedure Laterality Date    DENTAL SURGERY  4/1/13     No family history on file.   Social History     Socioeconomic History    Marital status: Single     Spouse name: None    Number of children: None    Years of education: None    Highest education level: None   Occupational History    None   Tobacco Use    Smoking status: Former Smoker     Packs/day: 1.00     Types: Cigarettes     Quit date: 2016     Years since quittin.4    Smokeless tobacco: Never Used   Vaping Use    Vaping Use: Every day    Substances: Nicotine   Substance and Sexual Activity    Alcohol use: No    Drug use: Not Currently     Types: Methamphetamines (Crystal Meth)     Comment: snort and smoke     Sexual activity: None   Other Topics Concern    None   Social History Narrative    None     Social Determinants of Health     Financial Resource Strain: High Risk    Difficulty of Paying Living Expenses: Hard   Food Insecurity: Food Insecurity Present    Worried About Running Out of Food in the Last Year: Sometimes true    Melodie of Food in the Last Year: Sometimes true   Transportation Needs:     Lack of Transportation (Medical): Not on file    Lack of Transportation (Non-Medical):  Not on file   Physical Activity:     Days of Exercise per Week: Not on file    Minutes of Exercise per Session: Not on file   Stress:     Feeling of Stress : Not on file   Social Connections:     Frequency of Communication with Friends and Family: Not on file    Frequency of Social Gatherings with Friends and Family: Not on file    Attends [employer] Services: Not on file    Active Member of [address] [place] [address] [employer] or Organizations: Not on file    Attends Club or Organization Meetings: Not on file    Marital Status: Not on file   Intimate Partner Violence:     Fear of Current or Ex-Partner: Not on file    Emotionally Abused: Not on file    Physically Abused: Not on file    Sexually Abused: Not on file   Housing Stability:     Unable to Pay for Housing in the Last Year: Not on file    Number of [place] in the Last Year: Not on file    Unstable Housing in the Last Year: Not on file     Current Outpatient Medications   Medication Sig Dispense Refill    risperiDONE (RISPERDAL) 3 MG tablet Take 1 tablet by mouth nightly 30 tablet 2    lisinopril (PRINIVIL;ZESTRIL) 10 MG tablet TAKE 1 TABLET BY MOUTH ONCE DAILY 90 tablet 1    buPROPion (WELLBUTRIN XL) 300 MG extended release tablet TAKE 1 TABLET BY MOUTH ONCE DAILY IN THE MORNING 30 tablet 1    lisdexamfetamine (VYVANSE) 70 MG capsule Take 1 capsule by mouth every morning for 30 days. 30 capsule 0    amphetamine-dextroamphetamine (ADDERALL, 20MG,) 20 MG tablet Take 1 tablet by mouth daily for 30 days. 30 tablet 0    diclofenac (VOLTAREN) 50 MG EC tablet Take 1 tablet by mouth 3 times daily as needed for Pain 90 tablet 1    metFORMIN (GLUCOPHAGE-XR) 500 MG extended release tablet Take 1 tablet by mouth daily (with breakfast) 90 tablet 3    cetirizine (ZYRTEC) 10 MG tablet TAKE 1 TABLET BY ORAL ROUTE EVERY DAY FOR ALLERGIC RHINITIS ORAL ONCE A DAY FOR 90 DAYS      rOPINIRole (REQUIP) 0.5 MG tablet TAKE 1 TABLET BY MOUTH AT BEDTIME      desvenlafaxine succinate (PRISTIQ) 100 MG TB24 extended release tablet Take 1 tablet by mouth daily 90 tablet 1    rizatriptan (MAXALT) 10 MG tablet Take 1 tablet by mouth once as needed for Migraine May repeat in 2 hours if needed 12 tablet 5    amphetamine-dextroamphetamine (ADDERALL) 20 MG tablet Take 1 tablet by mouth daily for 30 days. (Patient not taking: Reported on 6/23/2022) 30 tablet 0    omeprazole (PRILOSEC) 20 MG delayed release capsule Take 1 capsule by mouth Daily (Patient not taking: Reported on 6/23/2022) 90 capsule 2    VYVANSE 70 MG capsule Take 1 capsule by mouth every morning for 30 days. (Patient not taking: Reported on 6/23/2022) 30 capsule 0    lisdexamfetamine (VYVANSE) 70 MG capsule Take 1 capsule by mouth every morning for 30 days. (Patient not taking: Reported on 6/23/2022) 30 capsule 0    amphetamine-dextroamphetamine (ADDERALL, 20MG,) 20 MG tablet Take 1 tablet by mouth daily for 30 days. (Patient not taking: Reported on 6/23/2022) 30 tablet 0     No current facility-administered medications for this visit.      Allergies   Allergen Reactions    Seasonal Itching and Other (See Comments)         Review of Systems   Constitutional: Negative for chills and fever. HENT: Negative for congestion and sneezing. Eyes: Negative for pain and redness. Respiratory: Negative for chest tightness, shortness of breath and wheezing. Cardiovascular: Negative for chest pain and palpitations. Gastrointestinal: Negative for vomiting. Musculoskeletal: Positive for arthralgias. Skin: Negative for color change and rash. Neurological: Negative for weakness and numbness. Psychiatric/Behavioral: Negative for agitation. The patient is not nervous/anxious. Examination:  General Exam:  Vitals: /88   Pulse (!) 101   Resp 19   Ht 5' 10\" (1.778 m)   Wt 195 lb (88.5 kg)   SpO2 98%   BMI 27.98 kg/m²    Physical Exam  Vitals and nursing note reviewed. Constitutional:       Appearance: Normal appearance. HENT:      Head: Normocephalic and atraumatic. Eyes:      Conjunctiva/sclera: Conjunctivae normal.      Pupils: Pupils are equal, round, and reactive to light. Pulmonary:      Effort: Pulmonary effort is normal.   Musculoskeletal:      Left shoulder: No tenderness. Normal range of motion. Normal strength. Right elbow: No swelling, deformity, effusion or lacerations. Normal range of motion. No radial head, medial epicondyle, lateral epicondyle or olecranon process tenderness. Left wrist: No swelling, deformity, effusion or tenderness. Normal range of motion. Cervical back: Normal range of motion. Comments: Left upper extremity:  Skin is intact. Multiple scars present over the antecubital fossa and volar forearm. No erythema, induration, or drainage. Full active and passive range of motion present with elbow flexion and extension forearm supination pronation. The biceps muscle is in appropriate alignment with palpable biceps tendon in normal position at its attachment site. Mild tenderness to palpation in the antecubital fossa adjacent to the anterior elbow joint space. Moderate tenderness to palpation along the flexor pronator mass near its attachment site at the medial epicondyle. Strength is 5-5 with elbow flexion and extension and supination and pronation of the forearm but this does refer pain to the flexor pronator mass. Sensation and motor function is intact in the median, ulnar, radial nerve distributions. 2+ radial pulse and brisk cap refill present   Skin:     General: Skin is warm and dry. Neurological:      Mental Status: He is alert and oriented to person, place, and time. Psychiatric:         Mood and Affect: Mood normal.         Behavior: Behavior normal.            Diagnostic testing:  MRI images of the left upper extremity were reviewed by myself and discussed with the patient today:     MRI completed: 6/16/22  Impression   Grade 1 triceps muscle strain, measuring approximately 8 cm in craniocaudal   dimension.       This study does not adequately assess the elbow joint.  Within this   limitation, there is moderate distal insertional biceps tendinosis.  In   addition, there is fluid bright signal in the region of the distal biceps   tendon.  This could represent a distended bicipitoradial bursa; ,       Office Procedures:  Orders Placed This Encounter   Procedures   [facility]     Referral Priority:   Routine     Referral Type:   Eval and Treat     Referral Reason:   Specialty Services Required     Requested Specialty:   Physical Therapist     Number of Visits Requested:   1       Assessment and Plan  1. left elbow and forearm strain    I reviewed the MRI images with the patient and explained that I do not appreciate any tendon or ligament tear or elbow joint pathology. His exam and history are consistent with a strain of the forearm and elbow musculature.     I have reassured him that I do not expect any surgical intervention to be necessary. I recommended we proceed with rehabilitative activities. I did send an order for physical therapy. Follow-up in 6 weeks for check of his response to therapy. We discussed the potential for steroid injection to help with pain and to decrease inflammation in isolated areas of the elbow if he is having ongoing symptoms despite therapy.         Electronically signed by Mario Alberto Holland MD on 6/23/2022 at 5:13 PM

## 2022-06-24 RX ORDER — CETIRIZINE HYDROCHLORIDE 10 MG/1
10 TABLET ORAL DAILY
Qty: 90 TABLET | Refills: 0 | Status: SHIPPED | OUTPATIENT
Start: 2022-06-24 | End: 2022-10-05 | Stop reason: SDUPTHER

## 2022-06-24 RX ORDER — BUPROPION HYDROCHLORIDE 300 MG/1
TABLET ORAL
Qty: 30 TABLET | Refills: 0 | Status: SHIPPED | OUTPATIENT
Start: 2022-06-24 | End: 2022-08-02 | Stop reason: SDUPTHER

## 2022-06-24 RX ORDER — BUPROPION HYDROCHLORIDE 300 MG/1
TABLET ORAL
Qty: 30 TABLET | Refills: 1 | OUTPATIENT
Start: 2022-06-24

## 2022-06-24 ASSESSMENT — ENCOUNTER SYMPTOMS
WHEEZING: 0
VOMITING: 0
CHEST TIGHTNESS: 0
COLOR CHANGE: 0
SHORTNESS OF BREATH: 0
EYE REDNESS: 0
EYE PAIN: 0

## 2022-06-24 NOTE — TELEPHONE ENCOUNTER
It looks like we got 2 requests for the same medication today and it looks like I filled the first 1 already.

## 2022-06-27 ASSESSMENT — ENCOUNTER SYMPTOMS
BACK PAIN: 0
FACIAL SWELLING: 0
COUGH: 0
NAUSEA: 0
RHINORRHEA: 0
SHORTNESS OF BREATH: 0

## 2022-06-27 NOTE — PROGRESS NOTES
6/21/2022   Chief Complaint   Patient presents with    Results     Left elbow MRI        History of Present Illness:                             Bhaskar Alfonso is a 37 y.o. male return to the office today for MRI interpretation of his left upper extremity. Patient states his symptoms are unchanged and that he still has continued pain in the bicep, elbow, and forearm of his left arm. He states majority of his pain is on the volar aspect of his forearm and it worsens with rotational movement of the forearm and supination and pronation. Medical History  Patient's medications, allergies, past medical, surgical, social and family histories were reviewed and updated as appropriate. Review of Systems   Constitutional: Negative for fever. HENT: Negative for facial swelling and rhinorrhea. Respiratory: Negative for cough and shortness of breath. Cardiovascular: Negative for chest pain. Gastrointestinal: Negative for nausea. Musculoskeletal: Positive for arthralgias. Negative for back pain, gait problem, joint swelling, myalgias, neck pain and neck stiffness. Skin: Negative for pallor and rash. Neurological: Negative for facial asymmetry and speech difficulty. Psychiatric/Behavioral: Negative for agitation and confusion. Examination:  General Exam:  Vitals: /84 (Site: Left Upper Arm, Position: Sitting, Cuff Size: Medium Adult)   Pulse (!) 103   Resp 16   Ht 5' 9\" (1.753 m)   Wt 185 lb (83.9 kg)   SpO2 97%   BMI 27.32 kg/m²    Physical Exam   Physical Exam  Constitutional:       General: He is not in acute distress. Appearance: Normal appearance. He is normal weight. He is not ill-appearing. HENT:      Head: Normocephalic and atraumatic. Nose: No rhinorrhea. Eyes:      General: No scleral icterus. Right eye: No discharge. Left eye: No discharge. Cardiovascular:      Pulses: Normal pulses.    Pulmonary: Effort: Pulmonary effort is normal. No respiratory distress. Breath sounds: No stridor. Musculoskeletal:      Left shoulder: Normal.      Left elbow: No swelling, deformity, effusion or lacerations. Normal range of motion. Tenderness present in olecranon process. Left wrist: Normal.      Cervical back: Normal range of motion. Comments: Left upper extremity exam: Patient left shoulder remains without complication and patient can produce full active range of motion without pain at the shoulder. Patient maintains full active range of motion and function at the wrist and hand. Phalen sign negative. Phalen sign at the elbow negative. There is no reproducible paresthesias with pressure on the ulnar nerve at the elbow. Patient maintains full active range of motion in flexion and extension of the elbow as well as supination and pronation. Patient has some discomfort of the medial posterior and lateral aspects of the proximal elbow with supination and pronation of the forearm against resistance. His tenderness also extends into the proximal portion of the anterior forearm. Strength 5 out of 5 right compared to left with passive manipulation of the elbow and supination pronation of the forearm. No obvious point tenderness on the lateral or medial epicondyles of the elbow. Radial pulse 2+, brisk capillary refill. Skin:     General: Skin is warm. Capillary Refill: Capillary refill takes less than 2 seconds. Coloration: Skin is not jaundiced or pale. Neurological:      General: No focal deficit present. Mental Status: He is alert and oriented to person, place, and time.    Psychiatric:         Mood and Affect: Mood normal.         Behavior: Behavior normal.       Diagnostic testing:  X-rays reviewed in office, I independently reviewed the films in the office today:     Impression   Grade 1 triceps muscle strain, measuring approximately 8 cm in craniocaudal   dimension.       This study does not adequately assess the elbow joint.  Within this   limitation, there is moderate distal insertional biceps tendinosis.  In   addition, there is fluid bright signal in the region of the distal biceps   tendon.  This could represent a distended bicipitoradial bursa; however,   cannot exclude the possibility of a biceps tendon tear.  This could be better   assessed on a dedicated elbow MRI. Office Procedures:  No orders of the defined types were placed in this encounter. Assessment and Plan  1. Bicep tendon strain, left arm  -I explained the patient that his MRI did show some inflammation of the distal bicep and that this could be evidence of a possible tear that may require surgery. I suggested that he see Dr. Alina Cartwright in our office as soon as possible for a presurgery consult. Explained to the patient that surgical intervention for these types of injuries are time sensitive and that unnecessary delays could hinder the surgical process and lengthen the recovery time.  -Patient scheduled to follow-up with Dr. Alina Cartwright in 2 days.         Electronically signed by Justyna Herrera PA-C on 6/27/2022 at 6:31 AM

## 2022-07-05 ENCOUNTER — OFFICE VISIT (OUTPATIENT)
Dept: FAMILY MEDICINE CLINIC | Age: 42
End: 2022-07-05
Payer: MEDICARE

## 2022-07-05 VITALS
HEART RATE: 98 BPM | SYSTOLIC BLOOD PRESSURE: 110 MMHG | DIASTOLIC BLOOD PRESSURE: 78 MMHG | OXYGEN SATURATION: 98 % | WEIGHT: 188.4 LBS | HEIGHT: 70 IN | BODY MASS INDEX: 26.97 KG/M2 | TEMPERATURE: 97.3 F

## 2022-07-05 DIAGNOSIS — F90.2 ATTENTION DEFICIT HYPERACTIVITY DISORDER (ADHD), COMBINED TYPE: ICD-10-CM

## 2022-07-05 PROCEDURE — G8419 CALC BMI OUT NRM PARAM NOF/U: HCPCS | Performed by: FAMILY MEDICINE

## 2022-07-05 PROCEDURE — 1036F TOBACCO NON-USER: CPT | Performed by: FAMILY MEDICINE

## 2022-07-05 PROCEDURE — 99213 OFFICE O/P EST LOW 20 MIN: CPT | Performed by: FAMILY MEDICINE

## 2022-07-05 PROCEDURE — G8427 DOCREV CUR MEDS BY ELIG CLIN: HCPCS | Performed by: FAMILY MEDICINE

## 2022-07-05 RX ORDER — DEXTROAMPHETAMINE SACCHARATE, AMPHETAMINE ASPARTATE, DEXTROAMPHETAMINE SULFATE AND AMPHETAMINE SULFATE 5; 5; 5; 5 MG/1; MG/1; MG/1; MG/1
20 TABLET ORAL DAILY
Qty: 30 TABLET | Refills: 0 | Status: SHIPPED | OUTPATIENT
Start: 2022-09-07 | End: 2022-10-07

## 2022-07-05 RX ORDER — DEXTROAMPHETAMINE SACCHARATE, AMPHETAMINE ASPARTATE, DEXTROAMPHETAMINE SULFATE AND AMPHETAMINE SULFATE 5; 5; 5; 5 MG/1; MG/1; MG/1; MG/1
20 TABLET ORAL DAILY
Qty: 30 TABLET | Refills: 0 | Status: SHIPPED | OUTPATIENT
Start: 2022-08-08 | End: 2022-10-05 | Stop reason: SDUPTHER

## 2022-07-05 RX ORDER — DEXTROAMPHETAMINE SACCHARATE, AMPHETAMINE ASPARTATE, DEXTROAMPHETAMINE SULFATE AND AMPHETAMINE SULFATE 5; 5; 5; 5 MG/1; MG/1; MG/1; MG/1
20 TABLET ORAL DAILY
Qty: 30 TABLET | Refills: 0 | Status: SHIPPED | OUTPATIENT
Start: 2022-07-09 | End: 2022-08-08

## 2022-07-05 RX ORDER — LISDEXAMFETAMINE DIMESYLATE 70 MG/1
70 CAPSULE ORAL EVERY MORNING
Qty: 30 CAPSULE | Refills: 0 | Status: SHIPPED | OUTPATIENT
Start: 2022-09-07 | End: 2022-10-05 | Stop reason: SDUPTHER

## 2022-07-05 NOTE — ASSESSMENT & PLAN NOTE
His ADD is well controlled on the current dose of Vyvanse with 1 immediate release Adderall daily. I will continue the same and put out 3 months of prescriptions.

## 2022-07-05 NOTE — PATIENT INSTRUCTIONS
Patient Education        Learning About Attention Deficit Hyperactivity Disorder (ADHD) in Adults  What is ADHD? Attention deficit hyperactivity disorder (ADHD) is a condition in which people have a hard time paying attention. Adults with ADHD also may be more active than normal. They tend to act without thinking. ADHD may make it harder forthem to focus, get organized, and finish tasks. ADHD most often starts in childhood and lasts into adulthood. Many adults don't know that they have ADHD until their children are diagnosed. Then they begin tosee their own symptoms. Doctors don't know what causes ADHD. But it tends to run in families. What are the symptoms? The most common types of ADHD symptoms in adults are attention problems andhyperactivity. Attention problems  Adults with ADHD often find it hard to:  [employer] tasks that don't interest them or aren't easy. But they may become obsessed with activities that they find interesting and enjoy.  Keep relationships.  Focus their attention on conversations, reading materials, or jobs. They may change jobs a lot.  Remember things. They may misplace or lose things.  Pay attention. They are easily distracted. They find it hard to focus on one task.  Think before they act. They may make quick decisions. They may act before they think about the effect of their actions. Hyperactivity  Adults with ADHD may:   Fidget. They may swing their legs, shift in their seats, or tap their fingers.  Move around a lot. They may feel \"revved up\" or on the go. They may not be able to slow down until they are very tired.  Find it hard to relax. They may feel restless and find it hard to do quiet things like read or watch TV. How does ADHD affect daily life? ADHD in adults may affect:   Job performance. They may find it hard to organize their work, manage their time, and focus on one task at a time. They may forget, misplace, or lose things.  They may quit their friends, and coworkers. Couples counseling or family therapy can also help improve relationships. Counseling  Counseling is not meant to treat inattention, hyperactivity, or impulsiveness. But it can help with some of the problems that go along with ADHD. Theseinclude not getting along well with others and having problems following rules. Where can you learn more? Go to [email or web address] and sign in to your [employer] account. Enter P464 in the Qapa box to learn more about \"Learning About Attention Deficit Hyperactivity Disorder (ADHD) in Adults. \"     If you do not have an account, please click on the \"Sign Up Now\" link. Current as of: February 9, 2022               Content Version: 13.3  © 2006-2022 Healthwise, Incorporated. Care instructions adapted under license by [facility] ([facility]). If you have questions about a medical condition or this instruction, always ask your healthcare professional. [place] [address] any warranty or liability for your use of this information.

## 2022-07-05 NOTE — PROGRESS NOTES
7/5/22    Stacia Litten  1980    SUBJECTIVE    HPI - Yvette Betancourt is a 37 y.o. male who presents today for evaluation of:  Chief Complaint   Patient presents with    Follow-up    Medication Refill     ADD : Good control no trouble sleeping. Adderall is helping. Mood : OK w/ current dose Pristiq. Rash : mild ly itchy rash left shin. Review of Systems   Cardiovascular: Negative for chest pain and palpitations. Psychiatric/Behavioral: Negative for dysphoric mood, self-injury, sleep disturbance and suicidal ideas. The patient is not nervous/anxious. Allergies   Allergen Reactions    Seasonal Itching and Other (See Comments)        OBJECTIVE    /78 (Site: Left Upper Arm, Position: Sitting, Cuff Size: Large Adult)   Pulse 98   Temp 97.3 °F (36.3 °C) (Infrared)   Ht 5' 10\" (1.778 m)   Wt 188 lb 6.4 oz (85.5 kg)   SpO2 98%   BMI 27.03 kg/m²     Physical Exam   Constitutional:       General: Not in acute distress. Appearance: Normal appearance. Not ill-appearing. Eyes:      General: No scleral icterus. Cardiovascular:      Rate and Rhythm: Normal rate and regular rhythm. Heart sounds: No murmur heard. No friction rub. No gallop. Pulmonary:      Effort: Pulmonary effort is normal. No respiratory distress. Breath sounds: No wheezing, rhonchi or rales. Abdominal:      Palpations: Abdomen is soft. There is no mass. Tenderness: There is no abdominal tenderness. Musculoskeletal:     Moves all extremities normally. Skin:     General: Skin is warm. Coloration: Skin is not jaundiced. Neurological:      Mental Status: Patient is alert. Psychiatric:         Behavior: Behavior normal.         Thought Content: Thought content normal.         Judgment: Judgment normal.    PDMP is OK and shows last fill 6/9/22. ASSESSMENT/PLAN:    1.  Attention deficit hyperactivity disorder (ADHD), combined type  Assessment & Plan:   His ADD is well controlled on the current dose of Vyvanse with 1 immediate release Adderall daily. I will continue the same and put out 3 months of prescriptions. Orders:  -     amphetamine-dextroamphetamine (ADDERALL) 20 MG tablet; Take 1 tablet by mouth daily for 30 days. , Disp-30 tablet, R-0Normal  -     VYVANSE 70 MG capsule; Take 1 capsule by mouth every morning for 30 days. , Disp-30 capsule, R-0, DAWNormal  -     lisdexamfetamine (VYVANSE) 70 MG capsule; Take 1 capsule by mouth every morning for 30 days. , Disp-30 capsule, R-0Normal  -     amphetamine-dextroamphetamine (ADDERALL, 20MG,) 20 MG tablet; Take 1 tablet by mouth daily for 30 days. , Disp-30 tablet, R-0Normal  -     lisdexamfetamine (VYVANSE) 70 MG capsule; Take 1 capsule by mouth every morning for 30 days. , Disp-30 capsule, R-0Normal  -     amphetamine-dextroamphetamine (ADDERALL, 20MG,) 20 MG tablet; Take 1 tablet by mouth daily for 30 days. , Disp-30 tablet, R-0Normal          Counseling provided for:  Keep getting plenty of exercise.          Chintan Hilton MD

## 2022-08-02 RX ORDER — ROPINIROLE 0.5 MG/1
0.5 TABLET, FILM COATED ORAL NIGHTLY
Qty: 90 TABLET | Refills: 0 | Status: SHIPPED | OUTPATIENT
Start: 2022-08-02 | End: 2022-10-05 | Stop reason: SDUPTHER

## 2022-08-02 RX ORDER — BUPROPION HYDROCHLORIDE 300 MG/1
TABLET ORAL
Qty: 30 TABLET | Refills: 0 | OUTPATIENT
Start: 2022-08-02

## 2022-08-02 RX ORDER — BUPROPION HYDROCHLORIDE 300 MG/1
TABLET ORAL
Qty: 90 TABLET | Refills: 0 | Status: SHIPPED | OUTPATIENT
Start: 2022-08-02 | End: 2022-10-05 | Stop reason: SDUPTHER

## 2022-08-16 ENCOUNTER — OFFICE VISIT (OUTPATIENT)
Dept: ORTHOPEDIC SURGERY | Age: 42
End: 2022-08-16
Payer: MEDICARE

## 2022-08-16 VITALS — OXYGEN SATURATION: 97 % | HEART RATE: 112 BPM | SYSTOLIC BLOOD PRESSURE: 155 MMHG | DIASTOLIC BLOOD PRESSURE: 100 MMHG

## 2022-08-16 DIAGNOSIS — S56.912D STRAIN OF LEFT FOREARM, SUBSEQUENT ENCOUNTER: Primary | ICD-10-CM

## 2022-08-16 DIAGNOSIS — S46.912A ELBOW STRAIN, LEFT, INITIAL ENCOUNTER: ICD-10-CM

## 2022-08-16 PROCEDURE — G8427 DOCREV CUR MEDS BY ELIG CLIN: HCPCS | Performed by: ORTHOPAEDIC SURGERY

## 2022-08-16 PROCEDURE — 1036F TOBACCO NON-USER: CPT | Performed by: ORTHOPAEDIC SURGERY

## 2022-08-16 PROCEDURE — G8419 CALC BMI OUT NRM PARAM NOF/U: HCPCS | Performed by: ORTHOPAEDIC SURGERY

## 2022-08-16 PROCEDURE — 99213 OFFICE O/P EST LOW 20 MIN: CPT | Performed by: ORTHOPAEDIC SURGERY

## 2022-08-16 RX ORDER — IBUPROFEN 800 MG/1
800 TABLET ORAL EVERY 8 HOURS PRN
Qty: 90 TABLET | Refills: 1 | Status: SHIPPED | OUTPATIENT
Start: 2022-08-16 | End: 2022-10-05 | Stop reason: SDUPTHER

## 2022-08-16 ASSESSMENT — ENCOUNTER SYMPTOMS
COLOR CHANGE: 0
SHORTNESS OF BREATH: 0
CHEST TIGHTNESS: 0

## 2022-08-16 NOTE — PROGRESS NOTES
Patient is here for a follow up on left elbow. Patient denies new injury. Patien denies numbness or tingling in left arm. Patient describes  pain as aching, and rates it 8/10. Last visit with Quan Maldonado was with Dr Denis Cade on 6/23/22, assessment and plan as follows      Assessment and Plan  1. left elbow and forearm strain     I reviewed the MRI images with the patient and explained that I do not appreciate any tendon or ligament tear or elbow joint pathology. His exam and history are consistent with a strain of the forearm and elbow musculature. I have reassured him that I do not expect any surgical intervention to be necessary. I recommended we proceed with rehabilitative activities. I did send an order for physical therapy. Follow-up in 6 weeks for check of his response to therapy. We discussed the potential for steroid injection to help with pain and to decrease inflammation in isolated areas of the elbow if he is having ongoing symptoms despite therapy.            Electronically signed by Nita Le MD on 6/23/2022 at 5:13 PM
to the anterior elbow joint space. Moderate tenderness to palpation along the flexor pronator mass near its attachment site at the medial epicondyle. Strength is 5-5 with elbow flexion and extension and supination and pronation of the forearm but this does refer pain to the flexor pronator mass. Sensation and motor function is intact in the median, ulnar, radial nerve distributions. 2+ radial pulse and brisk cap refill present       Diagnostic testing:    MRI completed: 6/16/22  Impression   Grade 1 triceps muscle strain, measuring approximately 8 cm in craniocaudal   dimension. This study does not adequately assess the elbow joint. Within this   limitation, there is moderate distal insertional biceps tendinosis. In   addition, there is fluid bright signal in the region of the distal biceps   tendon. This could represent a distended bicipitoradial bursa; ,         Office Procedures:  No orders of the defined types were placed in this encounter. Assessment and Plan  1. left elbow and forearm strain    We discussed his ongoing symptoms in the left elbow and forearm. I have again reassured him that there is no evidence of structural damage on my exam or the MRI. I have recommended that we continue with conservative treatments at this time. We discussed the potential use of steroid injections if there is severe inflammation in the specific area. Currently he does not feel the symptoms are severe enough to require an injection. I have ordered ibuprofen 800 mg 3 times a day. I discussed the use of this for up to 2 months. I recommended regular stretching exercises and activity modification. I have sent him home with a home exercise program for elbow exercises.     Continue activities as tolerated and follow-up as needed        Electronically signed by Jessica Terry MD on 8/16/2022 at 8:41 AM

## 2022-09-11 DIAGNOSIS — F33.2 SEVERE EPISODE OF RECURRENT MAJOR DEPRESSIVE DISORDER, WITHOUT PSYCHOTIC FEATURES (HCC): ICD-10-CM

## 2022-09-12 RX ORDER — DESVENLAFAXINE 100 MG/1
TABLET, EXTENDED RELEASE ORAL
Qty: 30 TABLET | Refills: 0 | OUTPATIENT
Start: 2022-09-12

## 2022-09-12 RX ORDER — RISPERIDONE 3 MG/1
3 TABLET ORAL NIGHTLY
Qty: 30 TABLET | Refills: 0 | OUTPATIENT
Start: 2022-09-12

## 2022-09-12 RX ORDER — RISPERIDONE 3 MG/1
3 TABLET ORAL NIGHTLY
Qty: 30 TABLET | Refills: 0 | Status: SHIPPED | OUTPATIENT
Start: 2022-09-12 | End: 2022-10-05 | Stop reason: SDUPTHER

## 2022-09-12 RX ORDER — DESVENLAFAXINE 100 MG/1
100 TABLET, EXTENDED RELEASE ORAL DAILY
Qty: 90 TABLET | Refills: 0 | Status: SHIPPED | OUTPATIENT
Start: 2022-09-12 | End: 2022-10-05 | Stop reason: SDUPTHER

## 2022-10-05 ENCOUNTER — OFFICE VISIT (OUTPATIENT)
Dept: FAMILY MEDICINE CLINIC | Age: 42
End: 2022-10-05
Payer: MEDICARE

## 2022-10-05 VITALS
WEIGHT: 185 LBS | DIASTOLIC BLOOD PRESSURE: 85 MMHG | HEIGHT: 70 IN | HEART RATE: 117 BPM | SYSTOLIC BLOOD PRESSURE: 126 MMHG | TEMPERATURE: 96.8 F | OXYGEN SATURATION: 99 % | BODY MASS INDEX: 26.48 KG/M2

## 2022-10-05 DIAGNOSIS — G43.109 MIGRAINE WITH AURA AND WITHOUT STATUS MIGRAINOSUS, NOT INTRACTABLE: ICD-10-CM

## 2022-10-05 DIAGNOSIS — F33.1 MODERATE EPISODE OF RECURRENT MAJOR DEPRESSIVE DISORDER (HCC): ICD-10-CM

## 2022-10-05 DIAGNOSIS — J30.1 SEASONAL ALLERGIC RHINITIS DUE TO POLLEN: ICD-10-CM

## 2022-10-05 DIAGNOSIS — I10 PRIMARY HYPERTENSION: ICD-10-CM

## 2022-10-05 DIAGNOSIS — M77.12 LATERAL EPICONDYLITIS OF LEFT ELBOW: ICD-10-CM

## 2022-10-05 DIAGNOSIS — R73.03 PREDIABETES: ICD-10-CM

## 2022-10-05 DIAGNOSIS — F90.2 ATTENTION DEFICIT HYPERACTIVITY DISORDER (ADHD), COMBINED TYPE: Primary | ICD-10-CM

## 2022-10-05 DIAGNOSIS — G25.81 RLS (RESTLESS LEGS SYNDROME): ICD-10-CM

## 2022-10-05 DIAGNOSIS — K21.9 GASTROESOPHAGEAL REFLUX DISEASE WITHOUT ESOPHAGITIS: ICD-10-CM

## 2022-10-05 DIAGNOSIS — E88.81 METABOLIC SYNDROME: ICD-10-CM

## 2022-10-05 PROBLEM — S91.209A TOENAIL AVULSION: Status: RESOLVED | Noted: 2022-05-17 | Resolved: 2022-10-05

## 2022-10-05 PROBLEM — S46.912A STRAIN OF LEFT ELBOW: Status: RESOLVED | Noted: 2022-04-04 | Resolved: 2022-10-05

## 2022-10-05 PROCEDURE — 99214 OFFICE O/P EST MOD 30 MIN: CPT | Performed by: FAMILY MEDICINE

## 2022-10-05 PROCEDURE — G8419 CALC BMI OUT NRM PARAM NOF/U: HCPCS | Performed by: FAMILY MEDICINE

## 2022-10-05 PROCEDURE — G8484 FLU IMMUNIZE NO ADMIN: HCPCS | Performed by: FAMILY MEDICINE

## 2022-10-05 PROCEDURE — G8427 DOCREV CUR MEDS BY ELIG CLIN: HCPCS | Performed by: FAMILY MEDICINE

## 2022-10-05 PROCEDURE — 1036F TOBACCO NON-USER: CPT | Performed by: FAMILY MEDICINE

## 2022-10-05 RX ORDER — IBUPROFEN 800 MG/1
800 TABLET ORAL EVERY 8 HOURS PRN
Qty: 90 TABLET | Refills: 1 | Status: SHIPPED | OUTPATIENT
Start: 2022-10-05

## 2022-10-05 RX ORDER — BUPROPION HYDROCHLORIDE 300 MG/1
TABLET ORAL
Qty: 90 TABLET | Refills: 0 | Status: SHIPPED | OUTPATIENT
Start: 2022-10-05

## 2022-10-05 RX ORDER — RIZATRIPTAN BENZOATE 10 MG/1
10 TABLET ORAL
Qty: 12 TABLET | Refills: 5 | Status: SHIPPED | OUTPATIENT
Start: 2022-10-05 | End: 2022-10-05

## 2022-10-05 RX ORDER — METFORMIN HYDROCHLORIDE 500 MG/1
500 TABLET, EXTENDED RELEASE ORAL
Qty: 90 TABLET | Refills: 3 | Status: SHIPPED | OUTPATIENT
Start: 2022-10-05

## 2022-10-05 RX ORDER — ROPINIROLE 0.5 MG/1
0.5 TABLET, FILM COATED ORAL NIGHTLY
Qty: 90 TABLET | Refills: 1 | Status: SHIPPED | OUTPATIENT
Start: 2022-10-05

## 2022-10-05 RX ORDER — LISINOPRIL 10 MG/1
TABLET ORAL
Qty: 90 TABLET | Refills: 1 | Status: SHIPPED | OUTPATIENT
Start: 2022-10-05

## 2022-10-05 RX ORDER — RISPERIDONE 3 MG/1
3 TABLET, FILM COATED ORAL NIGHTLY
Qty: 90 TABLET | Refills: 1 | Status: SHIPPED | OUTPATIENT
Start: 2022-10-05

## 2022-10-05 RX ORDER — DEXTROAMPHETAMINE SACCHARATE, AMPHETAMINE ASPARTATE, DEXTROAMPHETAMINE SULFATE AND AMPHETAMINE SULFATE 5; 5; 5; 5 MG/1; MG/1; MG/1; MG/1
20 TABLET ORAL DAILY
Qty: 30 TABLET | Refills: 0 | Status: SHIPPED | OUTPATIENT
Start: 2022-12-11 | End: 2023-01-10

## 2022-10-05 RX ORDER — CETIRIZINE HYDROCHLORIDE 10 MG/1
10 TABLET ORAL DAILY
Qty: 90 TABLET | Refills: 3 | Status: SHIPPED | OUTPATIENT
Start: 2022-10-05

## 2022-10-05 RX ORDER — DEXTROAMPHETAMINE SACCHARATE, AMPHETAMINE ASPARTATE, DEXTROAMPHETAMINE SULFATE AND AMPHETAMINE SULFATE 5; 5; 5; 5 MG/1; MG/1; MG/1; MG/1
20 TABLET ORAL DAILY
Qty: 30 TABLET | Refills: 0 | Status: SHIPPED | OUTPATIENT
Start: 2022-11-11 | End: 2022-12-11

## 2022-10-05 RX ORDER — DESVENLAFAXINE 100 MG/1
100 TABLET, EXTENDED RELEASE ORAL DAILY
Qty: 90 TABLET | Refills: 0 | Status: SHIPPED | OUTPATIENT
Start: 2022-10-05

## 2022-10-05 RX ORDER — DEXTROAMPHETAMINE SACCHARATE, AMPHETAMINE ASPARTATE, DEXTROAMPHETAMINE SULFATE AND AMPHETAMINE SULFATE 5; 5; 5; 5 MG/1; MG/1; MG/1; MG/1
20 TABLET ORAL DAILY
Qty: 30 TABLET | Refills: 0 | Status: SHIPPED | OUTPATIENT
Start: 2022-10-12 | End: 2022-11-11

## 2022-10-05 RX ORDER — OMEPRAZOLE 20 MG/1
20 CAPSULE, DELAYED RELEASE ORAL DAILY
Qty: 90 CAPSULE | Refills: 2 | Status: SHIPPED | OUTPATIENT
Start: 2022-10-05

## 2022-10-05 RX ORDER — LISDEXAMFETAMINE DIMESYLATE 70 MG/1
70 CAPSULE ORAL EVERY MORNING
Qty: 30 CAPSULE | Refills: 0 | Status: SHIPPED | OUTPATIENT
Start: 2022-12-11 | End: 2023-01-10

## 2022-10-05 NOTE — ASSESSMENT & PLAN NOTE
His ADD is well controlled with and Adderall. He is taking a dose that is higher than usual.  I encouraged exercise and avoiding sugar. At some point as he gets older we would likely want to reduce the dosage.

## 2022-10-05 NOTE — PROGRESS NOTES
10/5/22    Maria Teresa Christian  1980    SUBJECTIVE    HPI - Kaylie Farah is a 40 y.o. male who presents today for evaluation of:  Chief Complaint   Patient presents with    3 Month Follow-Up     ADD, Medication refill     ADD: Vyvanse and Adderall are both helpful and he has taken this dose for some time. He has no chest pain. HTN : he does not check BP elsewhere. Lateral epicondylitis: He takes ibuprofen for pain relief from his tennis elbow. RLS : ropinerole works well. Mood : Good control of mood with the combo of Pristiq, Wellbutrin, & Risperidol. Review of Systems   Constitutional:  Negative for chills, fatigue and fever. Cardiovascular:  Negative for chest pain, palpitations and leg swelling. Psychiatric/Behavioral:  Negative for dysphoric mood, self-injury and suicidal ideas. The patient is nervous/anxious. Allergies   Allergen Reactions    Seasonal Itching and Other (See Comments)        OBJECTIVE    /85 (Site: Right Upper Arm)   Pulse (!) 117   Temp 96.8 °F (36 °C) (Infrared)   Ht 5' 10\" (1.778 m)   Wt 185 lb (83.9 kg)   SpO2 99%   BMI 26.54 kg/m²     Physical Exam   Constitutional:       General: Not in acute distress. Appearance: Normal appearance. Not ill-appearing. Eyes:      General: No scleral icterus. Cardiovascular:      Rate and Rhythm: Normal rate and regular rhythm. Heart sounds: No murmur heard. No friction rub. No gallop. Pulmonary:      Effort: Pulmonary effort is normal. No respiratory distress. Breath sounds: No wheezing, rhonchi or rales. Abdominal:      Palpations: Abdomen is soft. There is no mass. Tenderness: There is no abdominal tenderness. Musculoskeletal:     Moves all extremities normally. Skin:     General: Skin is warm. Coloration: Skin is not jaundiced. Neurological:      Mental Status: Patient is alert. Psychiatric:         Behavior: Behavior normal.         Thought Content:  Thought content normal. Judgment: Judgment normal.    Reviewed:  PDMP shows both Vyvanse 70mg and Adderall 20mg on 7/13/22, 8/11/22 & 9/12/22. ASSESSMENT/PLAN:    1. Attention deficit hyperactivity disorder (ADHD), combined type  Assessment & Plan:  His ADD is well controlled with and Adderall. He is taking a dose that is higher than usual.  I encouraged exercise and avoiding sugar. At some point as he gets older we would likely want to reduce the dosage. Orders:  -     VYVANSE 70 MG capsule; Take 1 capsule by mouth every morning for 30 days. , Disp-30 capsule, R-0, DAWNormal  -     lisdexamfetamine (VYVANSE) 70 MG capsule; Take 1 capsule by mouth every morning for 30 days. , Disp-30 capsule, R-0Normal  -     lisdexamfetamine (VYVANSE) 70 MG capsule; Take 1 capsule by mouth every morning for 30 days. , Disp-30 capsule, R-0Normal  -     amphetamine-dextroamphetamine (ADDERALL, 20MG,) 20 MG tablet; Take 1 tablet by mouth daily for 30 days. , Disp-30 tablet, R-0Normal  -     amphetamine-dextroamphetamine (ADDERALL) 20 MG tablet; Take 1 tablet by mouth daily for 30 days. , Disp-30 tablet, R-0Normal  -     amphetamine-dextroamphetamine (ADDERALL, 20MG,) 20 MG tablet; Take 1 tablet by mouth daily for 30 days. , Disp-30 tablet, R-0Normal  2. Migraine with aura and without status migrainosus, not intractable  Assessment & Plan:   Continue rizatriptan as needed for migraine headaches. Orders:  -     rizatriptan (MAXALT) 10 MG tablet; Take 1 tablet by mouth once as needed for Migraine May repeat in 2 hours if needed, Disp-12 tablet, R-5Normal  3. Gastroesophageal reflux disease without esophagitis  Assessment & Plan:   Refilled omeprazole that helps him for his GERD. Orders:  -     omeprazole (PRILOSEC) 20 MG delayed release capsule; Take 1 capsule by mouth Daily, Disp-90 capsule, R-2Normal  4. Metabolic syndrome  Assessment & Plan:   Continue metformin for metabolic syndrome and prediabetes.   Orders:  -     metFORMIN (GLUCOPHAGE-XR) 500 MG extended release tablet; Take 1 tablet by mouth daily (with breakfast), Disp-90 tablet, R-3Normal  5. Prediabetes  -     metFORMIN (GLUCOPHAGE-XR) 500 MG extended release tablet; Take 1 tablet by mouth daily (with breakfast), Disp-90 tablet, R-3Normal  6. HTN, Primary hypertension  Assessment & Plan:   His blood pressure is well controlled. Continue lisinopril 10 mg daily. Orders:  -     lisinopril (PRINIVIL;ZESTRIL) 10 MG tablet; TAKE 1 TABLET BY MOUTH ONCE DAILY, Disp-90 tablet, R-1Normal  7. RLS (restless legs syndrome)  Assessment & Plan:   Ropinirole works well for him so I will continue ropinirole to treat restless leg syndrome. Orders:  -     rOPINIRole (REQUIP) 0.5 MG tablet; Take 1 tablet by mouth at bedtime, Disp-90 tablet, R-1Normal  8. Seasonal allergic rhinitis due to pollen  Assessment & Plan:   Refilled cetirizine to treat allergic rhinitis. Orders:  -     cetirizine (ZYRTEC) 10 MG tablet; Take 1 tablet by mouth daily, Disp-90 tablet, R-3Normal  9. Lateral epicondylitis of left elbow  Assessment & Plan:   Refilled ibuprofen for tennis elbow. Orders:  -     ibuprofen (ADVIL;MOTRIN) 800 MG tablet; Take 1 tablet by mouth every 8 hours as needed for Pain, Disp-90 tablet, R-1Normal  10. Moderate episode of recurrent major depressive disorder [facility])  Assessment & Plan:   His mood is currently good and plan to continue Pristiq and bupropion. Also continuing risperidone. Orders:  -     risperiDONE (RISPERDAL) 3 MG tablet; Take 1 tablet by mouth nightly, Disp-90 tablet, R-1Normal  -     desvenlafaxine succinate (PRISTIQ) 100 MG TB24 extended release tablet; Take 1 tablet by mouth daily, Disp-90 tablet, R-0Normal  -     buPROPion (WELLBUTRIN XL) 300 MG extended release tablet; TAKE 1 TABLET BY MOUTH ONCE DAILY IN THE MORNING, Disp-90 tablet, R-0Normal        Counseling provided for:  Healthy eating - Avoid sugar and other refined carbohydrates.    >> Exercise - 1> try to do 150 minutes a week of exercise

## 2022-12-13 ENCOUNTER — OFFICE VISIT (OUTPATIENT)
Dept: FAMILY MEDICINE CLINIC | Age: 42
End: 2022-12-13
Payer: MEDICARE

## 2022-12-13 VITALS
HEART RATE: 115 BPM | OXYGEN SATURATION: 97 % | TEMPERATURE: 97.1 F | SYSTOLIC BLOOD PRESSURE: 132 MMHG | DIASTOLIC BLOOD PRESSURE: 84 MMHG

## 2022-12-13 DIAGNOSIS — H65.02 ACUTE SEROUS OTITIS MEDIA OF LEFT EAR, RECURRENCE NOT SPECIFIED: Primary | ICD-10-CM

## 2022-12-13 PROCEDURE — G8427 DOCREV CUR MEDS BY ELIG CLIN: HCPCS | Performed by: FAMILY MEDICINE

## 2022-12-13 PROCEDURE — G8419 CALC BMI OUT NRM PARAM NOF/U: HCPCS | Performed by: FAMILY MEDICINE

## 2022-12-13 PROCEDURE — G8484 FLU IMMUNIZE NO ADMIN: HCPCS | Performed by: FAMILY MEDICINE

## 2022-12-13 PROCEDURE — 3078F DIAST BP <80 MM HG: CPT | Performed by: FAMILY MEDICINE

## 2022-12-13 PROCEDURE — 99213 OFFICE O/P EST LOW 20 MIN: CPT | Performed by: FAMILY MEDICINE

## 2022-12-13 PROCEDURE — 3074F SYST BP LT 130 MM HG: CPT | Performed by: FAMILY MEDICINE

## 2022-12-13 PROCEDURE — 1036F TOBACCO NON-USER: CPT | Performed by: FAMILY MEDICINE

## 2022-12-13 RX ORDER — AMOXICILLIN 500 MG/1
500 CAPSULE ORAL 3 TIMES DAILY
Qty: 30 CAPSULE | Refills: 0 | Status: SHIPPED | OUTPATIENT
Start: 2022-12-13 | End: 2022-12-23

## 2022-12-13 ASSESSMENT — ENCOUNTER SYMPTOMS
VOMITING: 0
WHEEZING: 0
SINUS PRESSURE: 1
SINUS PAIN: 1
NAUSEA: 0
ABDOMINAL PAIN: 0
SHORTNESS OF BREATH: 0
SORE THROAT: 0
DIARRHEA: 0
COUGH: 0

## 2022-12-13 NOTE — PROGRESS NOTES
12/13/22    Nikita [facility]  1980    [place]    HPI - Dionna Harman is a 40 y.o. male who presents today for evaluation of:  Chief Complaint   Patient presents with    Sinus Problem    Ear Fullness     Sinus congestion x few days and L ear feels lie water in it. Left ear hurts and feels like water in it. Review of Systems   Constitutional:  Negative for fever. HENT:  Positive for ear pain, sinus pressure and sinus pain. Negative for sore throat. Respiratory:  Negative for cough, shortness of breath and wheezing. Gastrointestinal:  Negative for abdominal pain, diarrhea, nausea and vomiting. Neurological:  Positive for headaches. Allergies   Allergen Reactions    Seasonal Itching and Other (See Comments)        OBJECTIVE    /84 (Site: Right Upper Arm, Position: Sitting, Cuff Size: Medium Adult)   Pulse (!) 115   Temp 97.1 °F (36.2 °C) (Infrared)   SpO2 97%     Physical Exam   Constitutional:       General: Not in acute distress. Appearance: Normal appearance. Not ill-appearing. Eyes:      General: No scleral icterus. HENT:      Head: Normocephalic. Right Ear: Tympanic membrane, ear canal and external ear normal.      Left Ear: Tympanic membrane RED. Ear canal and external ear normal.      Nose: Nose normal.        Mouth/Throat:      Mouth: Mucous membranes are moist.      Pharynx: No oropharyngeal exudate, posterior oropharyngeal erythema or uvula swelling. Tonsils: No tonsillar exudate or tonsillar abscesses. Cardiovascular:      Rate and Rhythm: Normal rate and regular rhythm. Heart sounds: No murmur heard. No friction rub. No gallop. Pulmonary:      Effort: Pulmonary effort is normal. No respiratory distress. Breath sounds: No wheezing, rhonchi or rales. Abdominal:      Palpations: Abdomen is soft. There is no mass. Tenderness: There is no abdominal tenderness. Musculoskeletal:     Moves all extremities normally.     Skin:     General: Skin is warm.      Coloration: Skin is not jaundiced. Neurological:      Mental Status: Patient is alert. Psychiatric:         Behavior: Behavior normal.         Thought Content: Thought content normal.         Judgment: Judgment normal.          ASSESSMENT/PLAN:    1. Acute serous otitis media of left ear, recurrence not specified  -     amoxicillin (AMOXIL) 500 MG capsule; Take 1 capsule by mouth 3 times daily for 10 days, Disp-30 capsule, R-0Normal    His left ear is definitely inflamed and has the typical appearance of left otitis media. I will treat with amoxicillin. Work excuse provided. Return if symptoms worsen or fail to improve.    Alvaro Perla MD

## 2023-01-06 ENCOUNTER — OFFICE VISIT (OUTPATIENT)
Dept: FAMILY MEDICINE CLINIC | Age: 43
End: 2023-01-06
Payer: MEDICARE

## 2023-01-06 VITALS
WEIGHT: 214.6 LBS | SYSTOLIC BLOOD PRESSURE: 130 MMHG | DIASTOLIC BLOOD PRESSURE: 84 MMHG | HEART RATE: 102 BPM | HEIGHT: 70 IN | OXYGEN SATURATION: 96 % | BODY MASS INDEX: 30.72 KG/M2 | TEMPERATURE: 97.2 F

## 2023-01-06 DIAGNOSIS — F90.2 ATTENTION DEFICIT HYPERACTIVITY DISORDER (ADHD), COMBINED TYPE: ICD-10-CM

## 2023-01-06 DIAGNOSIS — F33.1 MODERATE EPISODE OF RECURRENT MAJOR DEPRESSIVE DISORDER (HCC): Primary | ICD-10-CM

## 2023-01-06 DIAGNOSIS — E88.81 METABOLIC SYNDROME: ICD-10-CM

## 2023-01-06 DIAGNOSIS — G25.81 RLS (RESTLESS LEGS SYNDROME): ICD-10-CM

## 2023-01-06 DIAGNOSIS — E78.2 MIXED HYPERLIPIDEMIA: ICD-10-CM

## 2023-01-06 DIAGNOSIS — I10 PRIMARY HYPERTENSION: ICD-10-CM

## 2023-01-06 PROBLEM — F33.2 SEVERE EPISODE OF RECURRENT MAJOR DEPRESSIVE DISORDER, WITHOUT PSYCHOTIC FEATURES (HCC): Status: ACTIVE | Noted: 2023-01-06

## 2023-01-06 LAB
CHOLESTEROL, TOTAL: 229 MG/DL (ref 0–199)
HDLC SERPL-MCNC: 42 MG/DL (ref 40–60)
LDL CHOLESTEROL CALCULATED: 137 MG/DL
TRIGL SERPL-MCNC: 250 MG/DL (ref 0–150)
VLDLC SERPL CALC-MCNC: 50 MG/DL

## 2023-01-06 PROCEDURE — 3079F DIAST BP 80-89 MM HG: CPT | Performed by: FAMILY MEDICINE

## 2023-01-06 PROCEDURE — 1036F TOBACCO NON-USER: CPT | Performed by: FAMILY MEDICINE

## 2023-01-06 PROCEDURE — G8427 DOCREV CUR MEDS BY ELIG CLIN: HCPCS | Performed by: FAMILY MEDICINE

## 2023-01-06 PROCEDURE — G8417 CALC BMI ABV UP PARAM F/U: HCPCS | Performed by: FAMILY MEDICINE

## 2023-01-06 PROCEDURE — 3075F SYST BP GE 130 - 139MM HG: CPT | Performed by: FAMILY MEDICINE

## 2023-01-06 PROCEDURE — 36415 COLL VENOUS BLD VENIPUNCTURE: CPT | Performed by: FAMILY MEDICINE

## 2023-01-06 PROCEDURE — G8484 FLU IMMUNIZE NO ADMIN: HCPCS | Performed by: FAMILY MEDICINE

## 2023-01-06 PROCEDURE — 99214 OFFICE O/P EST MOD 30 MIN: CPT | Performed by: FAMILY MEDICINE

## 2023-01-06 RX ORDER — DEXTROAMPHETAMINE SACCHARATE, AMPHETAMINE ASPARTATE, DEXTROAMPHETAMINE SULFATE AND AMPHETAMINE SULFATE 5; 5; 5; 5 MG/1; MG/1; MG/1; MG/1
20 TABLET ORAL DAILY
Qty: 30 TABLET | Refills: 0 | Status: SHIPPED | OUTPATIENT
Start: 2023-03-11 | End: 2023-04-10

## 2023-01-06 RX ORDER — RISPERIDONE 3 MG/1
3 TABLET ORAL NIGHTLY
Qty: 90 TABLET | Refills: 1 | Status: SHIPPED | OUTPATIENT
Start: 2023-01-06

## 2023-01-06 RX ORDER — BUPROPION HYDROCHLORIDE 300 MG/1
TABLET ORAL
Qty: 90 TABLET | Refills: 1 | Status: SHIPPED | OUTPATIENT
Start: 2023-01-06

## 2023-01-06 RX ORDER — DESVENLAFAXINE 100 MG/1
100 TABLET, EXTENDED RELEASE ORAL DAILY
Qty: 90 TABLET | Refills: 1 | Status: SHIPPED | OUTPATIENT
Start: 2023-01-06

## 2023-01-06 RX ORDER — LISDEXAMFETAMINE DIMESYLATE 70 MG/1
70 CAPSULE ORAL EVERY MORNING
Qty: 30 CAPSULE | Refills: 0 | Status: SHIPPED | OUTPATIENT
Start: 2023-01-10 | End: 2023-02-09

## 2023-01-06 RX ORDER — LISINOPRIL 10 MG/1
TABLET ORAL
Qty: 90 TABLET | Refills: 1 | Status: SHIPPED | OUTPATIENT
Start: 2023-01-06

## 2023-01-06 RX ORDER — DEXTROAMPHETAMINE SACCHARATE, AMPHETAMINE ASPARTATE, DEXTROAMPHETAMINE SULFATE AND AMPHETAMINE SULFATE 5; 5; 5; 5 MG/1; MG/1; MG/1; MG/1
20 TABLET ORAL DAILY
Qty: 30 TABLET | Refills: 0 | Status: SHIPPED | OUTPATIENT
Start: 2023-01-10 | End: 2023-02-09

## 2023-01-06 RX ORDER — ROPINIROLE 1 MG/1
1 TABLET, FILM COATED ORAL NIGHTLY
Qty: 90 TABLET | Refills: 1 | Status: SHIPPED | OUTPATIENT
Start: 2023-01-06

## 2023-01-06 RX ORDER — DEXTROAMPHETAMINE SACCHARATE, AMPHETAMINE ASPARTATE, DEXTROAMPHETAMINE SULFATE AND AMPHETAMINE SULFATE 5; 5; 5; 5 MG/1; MG/1; MG/1; MG/1
20 TABLET ORAL DAILY
Qty: 30 TABLET | Refills: 0 | Status: SHIPPED | OUTPATIENT
Start: 2023-02-09 | End: 2023-03-11

## 2023-01-06 ASSESSMENT — ENCOUNTER SYMPTOMS
ABDOMINAL PAIN: 0
VOMITING: 0
ABDOMINAL DISTENTION: 0
NAUSEA: 0

## 2023-01-06 NOTE — PROGRESS NOTES
1/6/23    [facility]  1980    BRYAN Navarro is a 40 y.o. male who presents today for evaluation of:  Chief Complaint   Patient presents with    3 Month Follow-Up     ADD, HTN, Mood      Back pain : 15 yr ago fell on 2 flights of stairs. Worse karol pain recently . Ibuprofen helps, no gi sxs. Gets exercise at work. Depression : currently moderate. No SI. In the past had a counselor with mental health that did not help much. ADD : Vyvanse and Adderall work well. HTN : well controlled. Review of Systems   Cardiovascular:  Negative for chest pain, palpitations and leg swelling. Gastrointestinal:  Negative for abdominal distention, abdominal pain, nausea and vomiting. Genitourinary:         No loss of BB control. Psychiatric/Behavioral:  Positive for sleep disturbance. Negative for hallucinations and suicidal ideas. The patient is nervous/anxious. Allergies   Allergen Reactions    Seasonal Itching and Other (See Comments)        OBJECTIVE    /84 (Site: Right Upper Arm, Position: Sitting, Cuff Size: Large Adult)   Pulse (!) 102   Temp 97.2 °F (36.2 °C) (Infrared)   Ht 5' 10\" (1.778 m)   Wt 214 lb 9.6 oz (97.3 kg)   SpO2 96%   BMI 30.79 kg/m²     Physical Exam   Constitutional:       General: Not in acute distress. Appearance: Normal appearance. Not ill-appearing. Eyes:      General: No scleral icterus. Cardiovascular:      Rate and Rhythm: Normal rate and regular rhythm. Heart sounds: No murmur heard. No friction rub. No gallop. Pulmonary:      Effort: Pulmonary effort is normal. No respiratory distress. Breath sounds: No wheezing, rhonchi or rales. Abdominal:      Palpations: Abdomen is soft. There is no mass. Tenderness: There is no abdominal tenderness. Musculoskeletal:     Moves all extremities normally. Skin:     General: Skin is warm. Coloration: Skin is not jaundiced.    Neurological:      Mental Status: Patient is alert.   Psychiatric:         Behavior: Behavior normal.         Thought Content: Thought content normal.         Judgment: Judgment normal.      Reviewed:  PDMP shows Vyvanse 70 and Adderall 20 most recentlly on 12/11     ASSESSMENT/PLAN:    1. Moderate episode of recurrent major depressive disorder [facility])  Assessment & Plan:   Depression is currently under pretty good control. Plan to continue Wellbutrin, Risperdal, Pristiq. He and I agree that he would benefit from counseling and I will try to refer him back to the same office where he has been seen in the past.  Orders:  -     buPROPion (WELLBUTRIN XL) 300 MG extended release tablet; TAKE 1 TABLET BY MOUTH ONCE DAILY IN THE MORNING, Disp-90 tablet, R-1Normal  -     risperiDONE (RISPERDAL) 3 MG tablet; Take 1 tablet by mouth nightly, Disp-90 tablet, R-1Normal  -     desvenlafaxine succinate (PRISTIQ) 100 MG TB24 extended release tablet; Take 1 tablet by mouth daily, Disp-90 tablet, R-1Normal  -     Amb External Referral To Behavioral Health  2. Attention deficit hyperactivity disorder (ADHD), combined type  Assessment & Plan:   ADD is well controlled with the combination of lisinopril 70 mg a day and Adderall 20 mg a day. I will continue them for another 3 months. Orders:  -     amphetamine-dextroamphetamine (ADDERALL, 20MG,) 20 MG tablet; Take 1 tablet by mouth daily for 30 days. , Disp-30 tablet, R-0Normal  -     VYVANSE 70 MG capsule; Take 1 capsule by mouth every morning for 30 days. , Disp-30 capsule, R-0, DAWNormal  -     lisdexamfetamine (VYVANSE) 70 MG capsule; Take 1 capsule by mouth every morning for 30 days. , Disp-30 capsule, R-0Normal  -     lisdexamfetamine (VYVANSE) 70 MG capsule; Take 1 capsule by mouth every morning for 30 days. , Disp-30 capsule, R-0Normal  -     amphetamine-dextroamphetamine (ADDERALL) 20 MG tablet; Take 1 tablet by mouth daily for 30 days. , Disp-30 tablet, R-0Normal  -     amphetamine-dextroamphetamine (ADDERALL) 20 MG tablet; Take 1 tablet by mouth daily for 30 days. , Disp-30 tablet, R-0Normal  -     Amb External Referral To Behavioral Health  3. HTN, Primary hypertension  Assessment & Plan:   Blood pressures under great control. Plan to continue lisinopril. Orders:  -     lisinopril (PRINIVIL;ZESTRIL) 10 MG tablet; TAKE 1 TABLET BY MOUTH ONCE DAILY, Disp-90 tablet, R-1Normal  4. RLS (restless legs syndrome)  Assessment & Plan:   Restless leg syndrome is not quite controlled with ropinirole 0.5 mg daily so I will increase to 1 mg tablets. Orders:  -     rOPINIRole (REQUIP) 1 MG tablet; Take 1 tablet by mouth at bedtime, Disp-90 tablet, R-1Normal  5. HLD, Mixed hyperlipidemia  Assessment & Plan: We will check a lipid panel. In March his triglycerides were over 300 and his HDL was 38. Orders:  -     Lipid Panel  6. Metabolic syndrome    Return in about 3 months (around 4/6/2023) for Depression, ADD, HLD. Carlos Lynn MD

## 2023-01-07 NOTE — ASSESSMENT & PLAN NOTE
ADD is well controlled with the combination of lisinopril 70 mg a day and Adderall 20 mg a day. I will continue them for another 3 months.

## 2023-01-07 NOTE — ASSESSMENT & PLAN NOTE
Restless leg syndrome is not quite controlled with ropinirole 0.5 mg daily so I will increase to 1 mg tablets.

## 2023-01-07 NOTE — ASSESSMENT & PLAN NOTE
Depression is currently under pretty good control. Plan to continue Wellbutrin, Risperdal, Pristiq.   He and I agree that he would benefit from counseling and I will try to refer him back to the same office where he has been seen in the past.

## 2023-04-07 ENCOUNTER — TELEPHONE (OUTPATIENT)
Dept: FAMILY MEDICINE CLINIC | Age: 43
End: 2023-04-07

## 2023-04-07 DIAGNOSIS — F90.2 ATTENTION DEFICIT HYPERACTIVITY DISORDER (ADHD), COMBINED TYPE: ICD-10-CM

## 2023-04-07 RX ORDER — DEXTROAMPHETAMINE SACCHARATE, AMPHETAMINE ASPARTATE, DEXTROAMPHETAMINE SULFATE AND AMPHETAMINE SULFATE 5; 5; 5; 5 MG/1; MG/1; MG/1; MG/1
20 TABLET ORAL DAILY
Qty: 7 TABLET | Refills: 0 | Status: SHIPPED | OUTPATIENT
Start: 2023-04-11 | End: 2023-04-18 | Stop reason: SDUPTHER

## 2023-04-07 RX ORDER — LISDEXAMFETAMINE DIMESYLATE 70 MG/1
70 CAPSULE ORAL EVERY MORNING
Qty: 7 CAPSULE | Refills: 0 | Status: SHIPPED | OUTPATIENT
Start: 2023-04-11 | End: 2023-04-18 | Stop reason: SDUPTHER

## 2023-04-18 ENCOUNTER — OFFICE VISIT (OUTPATIENT)
Dept: FAMILY MEDICINE CLINIC | Age: 43
End: 2023-04-18
Payer: MEDICAID

## 2023-04-18 VITALS
OXYGEN SATURATION: 96 % | HEIGHT: 70 IN | BODY MASS INDEX: 29.84 KG/M2 | TEMPERATURE: 97.3 F | DIASTOLIC BLOOD PRESSURE: 89 MMHG | HEART RATE: 106 BPM | SYSTOLIC BLOOD PRESSURE: 133 MMHG | WEIGHT: 208.4 LBS

## 2023-04-18 DIAGNOSIS — I10 PRIMARY HYPERTENSION: ICD-10-CM

## 2023-04-18 DIAGNOSIS — R73.03 PREDIABETES: ICD-10-CM

## 2023-04-18 DIAGNOSIS — F90.2 ATTENTION DEFICIT HYPERACTIVITY DISORDER (ADHD), COMBINED TYPE: ICD-10-CM

## 2023-04-18 DIAGNOSIS — F41.9 ANXIETY: ICD-10-CM

## 2023-04-18 DIAGNOSIS — Z00.00 ENCOUNTER FOR WELL ADULT EXAM WITHOUT ABNORMAL FINDINGS: Primary | ICD-10-CM

## 2023-04-18 DIAGNOSIS — F33.2 SEVERE EPISODE OF RECURRENT MAJOR DEPRESSIVE DISORDER, WITHOUT PSYCHOTIC FEATURES (HCC): ICD-10-CM

## 2023-04-18 PROCEDURE — 3079F DIAST BP 80-89 MM HG: CPT | Performed by: FAMILY MEDICINE

## 2023-04-18 PROCEDURE — 3075F SYST BP GE 130 - 139MM HG: CPT | Performed by: FAMILY MEDICINE

## 2023-04-18 PROCEDURE — 99214 OFFICE O/P EST MOD 30 MIN: CPT | Performed by: FAMILY MEDICINE

## 2023-04-18 PROCEDURE — 99396 PREV VISIT EST AGE 40-64: CPT | Performed by: FAMILY MEDICINE

## 2023-04-18 RX ORDER — LISDEXAMFETAMINE DIMESYLATE 70 MG/1
70 CAPSULE ORAL EVERY MORNING
Qty: 30 CAPSULE | Refills: 0 | Status: SHIPPED | OUTPATIENT
Start: 2023-04-21 | End: 2023-05-21

## 2023-04-18 RX ORDER — DEXTROAMPHETAMINE SACCHARATE, AMPHETAMINE ASPARTATE, DEXTROAMPHETAMINE SULFATE AND AMPHETAMINE SULFATE 5; 5; 5; 5 MG/1; MG/1; MG/1; MG/1
20 TABLET ORAL DAILY
Qty: 30 TABLET | Refills: 0 | Status: SHIPPED | OUTPATIENT
Start: 2023-04-21 | End: 2023-05-21

## 2023-04-18 RX ORDER — LISINOPRIL 20 MG/1
TABLET ORAL
Qty: 90 TABLET | Refills: 2 | Status: SHIPPED | OUTPATIENT
Start: 2023-04-18

## 2023-04-18 ASSESSMENT — ENCOUNTER SYMPTOMS
DIARRHEA: 0
ABDOMINAL PAIN: 0
VOMITING: 0
TROUBLE SWALLOWING: 0
CONSTIPATION: 0
APNEA: 0
NAUSEA: 0
COUGH: 0
SHORTNESS OF BREATH: 0
EYE PAIN: 0
WHEEZING: 0
BACK PAIN: 0

## 2023-04-18 ASSESSMENT — COLUMBIA-SUICIDE SEVERITY RATING SCALE - C-SSRS
4. HAVE YOU HAD THESE THOUGHTS AND HAD SOME INTENTION OF ACTING ON THEM?: NO
3. HAVE YOU BEEN THINKING ABOUT HOW YOU MIGHT KILL YOURSELF?: YES
1. WITHIN THE PAST MONTH, HAVE YOU WISHED YOU WERE DEAD OR WISHED YOU COULD GO TO SLEEP AND NOT WAKE UP?: YES
5. HAVE YOU STARTED TO WORK OUT OR WORKED OUT THE DETAILS OF HOW TO KILL YOURSELF? DO YOU INTEND TO CARRY OUT THIS PLAN?: YES
2. HAVE YOU ACTUALLY HAD ANY THOUGHTS OF KILLING YOURSELF?: YES
6. HAVE YOU EVER DONE ANYTHING, STARTED TO DO ANYTHING, OR PREPARED TO DO ANYTHING TO END YOUR LIFE?: NO

## 2023-04-18 ASSESSMENT — PATIENT HEALTH QUESTIONNAIRE - PHQ9
4. FEELING TIRED OR HAVING LITTLE ENERGY: 3
5. POOR APPETITE OR OVEREATING: 0
SUM OF ALL RESPONSES TO PHQ QUESTIONS 1-9: 16
3. TROUBLE FALLING OR STAYING ASLEEP: 3
SUM OF ALL RESPONSES TO PHQ9 QUESTIONS 1 & 2: 6
SUM OF ALL RESPONSES TO PHQ QUESTIONS 1-9: 19
7. TROUBLE CONCENTRATING ON THINGS, SUCH AS READING THE NEWSPAPER OR WATCHING TELEVISION: 3
8. MOVING OR SPEAKING SO SLOWLY THAT OTHER PEOPLE COULD HAVE NOTICED. OR THE OPPOSITE, BEING SO FIGETY OR RESTLESS THAT YOU HAVE BEEN MOVING AROUND A LOT MORE THAN USUAL: 0
SUM OF ALL RESPONSES TO PHQ QUESTIONS 1-9: 19
9. THOUGHTS THAT YOU WOULD BE BETTER OFF DEAD, OR OF HURTING YOURSELF: 3
SUM OF ALL RESPONSES TO PHQ QUESTIONS 1-9: 19
6. FEELING BAD ABOUT YOURSELF - OR THAT YOU ARE A FAILURE OR HAVE LET YOURSELF OR YOUR FAMILY DOWN: 1
1. LITTLE INTEREST OR PLEASURE IN DOING THINGS: 3
2. FEELING DOWN, DEPRESSED OR HOPELESS: 3

## 2023-04-18 NOTE — ASSESSMENT & PLAN NOTE
He has significant anxiety. We are treating depression with Pristiq and bupropion which should help the anxiety as well. Also he gets risperidone. I referred to a counselor. I asked him to try to locate a counselor that can see him soon so that we can be sure to fax the report to that counselor. Recommended getting out of the house.   Documented severity of his anxiety for which has caused very substantial employment trouble for him in the past.

## 2023-04-18 NOTE — PROGRESS NOTES
refined carbohydrates. and Eat more foods with fiber like vegetables and whole grain products.   >> Exercise - 1> try to do 150 minutes a week of exercise that is as hard as walking briskly (30 minutes 5 days a week or 22 minutes every day); and 2> do some strength training 2 or 3 times a week. Sleep hygeine - Get enough rest. Things that help are making sure the room is dark and cool, avoiding screen use for 1-2 hours before bedtime, having an unwinding routine. and Mentally activity - Keep trying to learn new things, reading, following sports/fashion/whatever you like, and other mental things to keep your brain working well and avoid dementia. Remember to be thankful for the good things in life. I pointed out to him the numerous QR codes to videos to help with depression that I put on his after visit summary. Return in about 4 weeks (around 5/16/2023) for depression.      Kamran Rosas MD

## 2023-04-18 NOTE — ASSESSMENT & PLAN NOTE
I discussed suicide risk with him. I gave him a list of mental health resources available in the [facility] area. I also referred him to care coordinator. I pointed out the emergency hotline or crisis line at the bottom of the page that I gave him with [facility] resources. I discussed with him that most people who commit suicide have some alcohol in their system and recommended that he make a point to continue to not consume any alcohol. He does not have any guns in the house although he does carry a somewhat large knife. He and I did not think he was at risk of committing suicide with that knife. I encouraged doing things with his children. I encouraged getting out of the house. I encouraged exercise either in the house or out of the house. He is already taking both bupropion and Pristiq and will continue.

## 2023-04-18 NOTE — ASSESSMENT & PLAN NOTE
PDMP was reviewed and showed that it is appropriate for him to get his next month of Vyvanse and Adderall on April 21 and I put out a prescription for that.

## 2023-04-18 NOTE — ASSESSMENT & PLAN NOTE
Blood pressure is borderline controlled. I increased lisinopril from 10 mg to daily to 20 mg daily. I think it will pay off in future years that he increase his blood pressure medication to reduce risk of stroke, congestive heart failure, heart attack, kidney disease.

## 2023-04-18 NOTE — PATIENT INSTRUCTIONS
to all appointments, and call your doctor if you are having problems. It's also a good idea to know your test results and keep a list of the medicines you take. Where can you learn more? Go to http://www.guzman.com/ and enter U807 to learn more about \"A Healthy Lifestyle: Care Instructions. \"  Current as of: November 14, 2022               Content Version: 13.6  © 2006-2023 Healthwise, Incorporated. Care instructions adapted under license by [facility] ([facility]). If you have questions about a medical condition or this instruction, always ask your healthcare professional. [address] any warranty or liability for your use of this information.

## 2023-04-19 ENCOUNTER — CARE COORDINATION (OUTPATIENT)
Dept: CARE COORDINATION | Age: 43
End: 2023-04-19

## 2023-04-20 ENCOUNTER — CARE COORDINATION (OUTPATIENT)
Dept: CARE COORDINATION | Age: 43
End: 2023-04-20

## 2023-04-20 DIAGNOSIS — I10 PRIMARY HYPERTENSION: Primary | Chronic | ICD-10-CM

## 2023-04-20 SDOH — ECONOMIC STABILITY: FOOD INSECURITY: WITHIN THE PAST 12 MONTHS, YOU WORRIED THAT YOUR FOOD WOULD RUN OUT BEFORE YOU GOT MONEY TO BUY MORE.: SOMETIMES TRUE

## 2023-04-20 SDOH — ECONOMIC STABILITY: TRANSPORTATION INSECURITY
IN THE PAST 12 MONTHS, HAS THE LACK OF TRANSPORTATION KEPT YOU FROM MEDICAL APPOINTMENTS OR FROM GETTING MEDICATIONS?: NO

## 2023-04-20 SDOH — ECONOMIC STABILITY: TRANSPORTATION INSECURITY
IN THE PAST 12 MONTHS, HAS LACK OF TRANSPORTATION KEPT YOU FROM MEETINGS, WORK, OR FROM GETTING THINGS NEEDED FOR DAILY LIVING?: NO

## 2023-04-20 SDOH — ECONOMIC STABILITY: FOOD INSECURITY: WITHIN THE PAST 12 MONTHS, THE FOOD YOU BOUGHT JUST DIDN'T LAST AND YOU DIDN'T HAVE MONEY TO GET MORE.: SOMETIMES TRUE

## 2023-04-20 ASSESSMENT — SOCIAL DETERMINANTS OF HEALTH (SDOH): HOW HARD IS IT FOR YOU TO PAY FOR THE VERY BASICS LIKE FOOD, HOUSING, MEDICAL CARE, AND HEATING?: HARD

## 2023-04-20 NOTE — CARE COORDINATION
ACM received secure message from [employer]. When completing RPM contact - stated [facility] has not reached out to pt. ACM will contact. ACM call to [facility], left  requesting call to pt to schedule appt. Also left ACM contact information for scheduling appt.
dissatisfaction but no concern   How would you rate their social network (family, work, friends)?: Restricted participation with some degree of social isolation   How would you rate their financial resources (including ability to afford all required medical care)?: Financially secure, some resource challenges   How wells does the patient now understand their health and well-being (symptoms, signs or risk factors) and what they need to do to manage their health?: Reasonable to good understanding but do not feel able to engage with advice at this time   How well do you think your patient can engage in healthcare discussions? (Barriers include language, deafness, aphasia, alcohol or drug problems, learning difficulties, concentration): Adequate communication, with or without minor barriers   Do other services need to be involved to help this patient?: Other care/services in place and adequate   Are current services involved with this patient well-coordinated? (Include coordination with other services you are now recommendation): Required care/services in place and adequately coordinated   Suggested Interventions and [employer] [facility]: In Process (Comment: [facility])      Fall Risk Prevention: In Process Home Health Services: Declined   Medication Assistance Program: Declined   Social Work: In Process   Transportation Services: Declined   Zone Management Tools:  In Process         Set up/Review Goals, Set up/Review an Education Plan              Future Appointments   Date Time Provider Clifton Joy   5/16/2023  4:30 PM Britany Barnes MD [facility] Jessica BONILLA [facility]

## 2023-04-20 NOTE — PROGRESS NOTES
4/20/23 9:53 AM       Remote Patient Monitoring Treatment Plan    Received request from ACM/NEO Mullen RN  to order remote patient monitoring for in home monitoring of HTN and order completed. Patient will be monitoring blood pressure   pulse ox . Please note: ACM has stated no scale is needed. Pt will not require weight monitoring via RPM.     Patient will engage in Remote Patient Monitoring each day to develop the skills necessary for self management. RPM Care Team Responsibilities:   Alerts will be reviewed daily and addressed within 2-4 hours during operational hours (Monday -Friday 9 am-4 pm)  Alert response and intervention documented in patient medical record  Alert response escalated to PCP per protocol and documented in patient medical record  Patient monitored over approximately  days  Discharge from program based on self-management readiness    See care coordination encounters for additional details.      Kristian Menjivar DNP, FNP-C, Remote Patient Monitoring NP, (ph) [phone]

## 2023-04-20 NOTE — CARE COORDINATION
Remote Patient Kit Ordering Note      Date/Time:  4/20/2023 10:12 AM      [x] [employer] confirmed patient shipping address  [x] Patient will receive package over the next 2-4 business days. Someone 21 years or older must be present to sign for UPS delivery. [x] Patient to contact virtual installation-specific phone number listed in the patient instructions. [x] If the patient does not contact HRS within 24 hours, an [employer] [facility] will call the patient directly: If the patient does not answer, HRS will follow up with the clinical team notifying them about the unsuccessful attempt to contact the patient. HRS will make three call attempts to the patient. [x] LPN will contact patient once equipment is active to welcome them to the program.                                                         [x] Hours of RPM monitoring - Monday-Friday [id number]                     All questions answered at this time. LPN made aware the RPM kit has been ordered. EMTP notified patient of RPM equipment order.

## 2023-04-21 ENCOUNTER — CARE COORDINATION (OUTPATIENT)
Dept: CARE COORDINATION | Age: 43
End: 2023-04-21

## 2023-04-21 NOTE — CARE COORDINATION
if this SW mails one out to him. Secure email sent to coworker, Anabella Manzo requesting she send Pt utility assistance application via postal mail. Identified Needs:  Utility assistance      Social Work Plan:  SW will provide support to Pt as he completes utility assistance application and assessment     Next Steps: SW will follow up with Pt on 5/5 to confirm he received application and offer support. Method of Communication With Provider (if appropriate): Chart Routing       Goals Addressed                      This Visit's Progress      Patient Stated (pt-stated)         I will apply for utility assistance    Barriers: financial, overwhelmed by complexity of regimen, and stress  Plan for overcoming my barriers: SW and Pt's Significant other will provide Pt with support with completing application for assistance  Confidence: 8/10  Anticipated Goal Completion Date: 7/21/23                      Pt reported his mental health assessment was today.  Tuesday for 10am

## 2023-04-27 ENCOUNTER — CARE COORDINATION (OUTPATIENT)
Dept: CARE COORDINATION | Age: 43
End: 2023-04-27

## 2023-04-28 ENCOUNTER — CARE COORDINATION (OUTPATIENT)
Dept: CASE MANAGEMENT | Age: 43
End: 2023-04-28

## 2023-04-28 ENCOUNTER — CARE COORDINATION (OUTPATIENT)
Dept: CARE COORDINATION | Age: 43
End: 2023-04-28

## 2023-04-28 DIAGNOSIS — R00.0 TACHYCARDIA: Primary | ICD-10-CM

## 2023-04-28 NOTE — CARE COORDINATION
Ambulatory Care Coordination Note  2023    Patient Current Location:  Home: [address] [address]     CASSIUS WOOD contacted the patient by telephone. Verified name and  with patient as identifiers. Provided introduction to self, and explanation of the [employer] role. Challenges to be reviewed by the provider   Additional needs identified to be addressed with provider: No  none               Method of communication with provider: none. I spoke with the patient for continued Care Coordination follow up and education. Patient states he is doing okay. Patient did receive RPM equipment, but has not set up. Encouraged the importance of doing so. Patient voiced understanding. Notified application for HEAP/PIPP was mailed out yesterday. Denies any current concerns or questions. I advised patient to contact PCP office if needed. No further needs at this time. Lab Results       None            Care Coordination Interventions    Referral from Primary Care Provider: Yes  Suggested Interventions and [facility]: In Process (Comment: [facility])  Fall Risk Prevention: In Process  Home Health Services: Declined  Medication Assistance Program: Declined (Comment: Insurance covers)  Social Work: In Process (Comment: HEAP/PIPP, possible SSDI)  Transportation Support: Declined  Zone Management Tools:  In Process (Comment: HTN)          Goals Addressed    None         Future Appointments   Date Time Provider Clifton Joy   2023  4:30 PM Steve Malik MD [facility] Jessica BRUNNER

## 2023-04-28 NOTE — CARE COORDINATION
Call to PCP office regarding call that was routed to PCP. He is not in the office today and no one is covering his basket. PCP will be checking the basket this evening per . Reanna Watson RN, BSN  Care Coordinator  Cell [phone]  Email [email or web address]

## 2023-04-28 NOTE — CARE COORDINATION
Remote Alert Monitoring Note      Date/Time:  2023 12:11 PM  Patient Current Location: Home: [address] [address]    RN  contacted patient by telephone regarding red alert received for blood pressure heart rate (117) and pulse ox heart rate (115). Verified patients name and  as identifiers. Background:   Pt enrolled in in RPM for HTN  Refer to 911 immediately if:  Patient unresponsive or unable to provide history  Change in cognition or sudden confusion  Patient unable to respond in complete sentences  Intense chest pain/tightness  Any concern for any clinical emergency  Red Alert: Provider response time of 1 hr required for any red alert requiring intervention  Yellow Alert: Provider response time of 3hr required for any escalated yellow alert    HR Triage  Are you having any Chest Pain? no   Are you having any Shortness of Breath? yes   Are you having any dizziness? no   Are you feeling more fatigued or tired than normal? no   Are you having any other health concerns or issues? no       Clinical Interventions: Escalated alert to PCP-pt HR is elevated, had pt repeat metrics while on phone and HR is 127. Denies symptoms other than slight SOB. Educated pt what to do over the weekend should symptoms change and HR remain elevated. Plan/Follow Up: Will continue to review, monitor and address alerts with follow up based on severity of symptoms and risk factors.      Current Patient Metrics ---- Blood Pressure: 132/87, 127bpm Pulseox: 96%, 126bpm Survey: C Weight: 200.2lbs Note Created at: 2023 12:26 PM ET ---- Time-Spent: 10 minutes 0 seconds

## 2023-04-29 RX ORDER — METOPROLOL SUCCINATE 25 MG/1
25 TABLET, EXTENDED RELEASE ORAL DAILY
Qty: 30 TABLET | Refills: 5 | Status: SHIPPED | OUTPATIENT
Start: 2023-04-29

## 2023-04-29 NOTE — TELEPHONE ENCOUNTER
I called him and he agrees to start a beta blocker to lower HR. He feels he needs the full dose of Vyvanse and Adderall.

## 2023-05-01 ENCOUNTER — CARE COORDINATION (OUTPATIENT)
Dept: CASE MANAGEMENT | Age: 43
End: 2023-05-01

## 2023-05-01 NOTE — CARE COORDINATION
Remote Patient Monitoring Note      Date/Time:  5/1/2023 12:10 PM   1ST ATTEMPTED WELCOME CALL    LPN reviewed patients reported daily Remote Patient Monitoring metrics. All reported metrics are within alert parameters. Plan/Follow Up: Will continue to review, monitor and address alerts with follow up based on severity of symptoms and risk factors.     Current Patient Metrics ---- Blood Pressure: 124/84, 94bpm Pulseox: 98%, 77bpm Survey: C Weight: 202.8lbs Note Created at: 05/01/2023 12:11 PM ET ---- Time-Spent: 1 minutes 0 seconds

## 2023-05-02 ENCOUNTER — CARE COORDINATION (OUTPATIENT)
Dept: CASE MANAGEMENT | Age: 43
End: 2023-05-02

## 2023-05-02 NOTE — CARE COORDINATION
Remote Alert Monitoring Note      Date/Time:  2023 8:10 AM  Patient Current Location: [facility]    LPN contacted patient by telephone regarding red alert received for weight increase (4# over night). Verified patients name and  as identifiers. Background: HTN  Refer to 911 immediately if:  Patient unresponsive or unable to provide history  Change in cognition or sudden confusion  Patient unable to respond in complete sentences  Intense chest pain/tightness  Any concern for any clinical emergency  Red Alert: Provider response time of 1 hr required for any red alert requiring intervention  Yellow Alert: Provider response time of 3hr required for any escalated yellow alert    Weight Scale Triage  Was your weight obtained upon rising/waking today? Yes   Was your weight obtained after voiding and/or use of the bathroom today? yes   Did you weigh yourself in the same amount of clothing today, compared to how you typically do? yes   Was the scale bumped or moved prior to today's weight? no   Is your scale on a flat/hard surface? yes   Did you obtain your weight with shoes on? no   If yes, is this something you normally do during your daily weights? no   Were you standing up straight on the scale today? yes   Were you leaning on anything while obtaining your weight today? no       Clinical Interventions: Reviewed and followed up on alerts and treatments-Patient has 4# weight gain over night. Denies CP, swelling, scale issues. Patient states is \"a little SOB\" but has been for awhile now. Patient states he ate a lot yesterday and NA intake increased. Has PCP appointment 23. Will monitor weight. Education of patient/family/caregiver/guardian to support self-management-Monitor weight. Decrease NA in diet.     Instructed patient on the importance of weighing daily, in the morning after urinating, Same Clothing on, NO shoes, Scale on Flat/hard surface, and that if the patient has weight gain of 3# over night or

## 2023-05-05 ENCOUNTER — CARE COORDINATION (OUTPATIENT)
Dept: CARE COORDINATION | Age: 43
End: 2023-05-05

## 2023-05-05 NOTE — CARE COORDINATION
ACM call to pt for CC outreach. Spoke to Suze Grey briefly. States he is currently at [facility] for appt. Confirmed appt with PCP 5/16. Next outreach planned after PCP appt. Reviewed LSW notes.  Pt has been active with Jose Guzman re: utility assistance with SS.

## 2023-05-10 ENCOUNTER — CARE COORDINATION (OUTPATIENT)
Dept: CARE COORDINATION | Age: 43
End: 2023-05-10

## 2023-05-10 ENCOUNTER — HOSPITAL ENCOUNTER (OUTPATIENT)
Dept: PSYCHIATRY | Age: 43
Setting detail: THERAPIES SERIES
Discharge: HOME | End: 2023-05-10
Payer: MEDICAID

## 2023-05-10 PROCEDURE — 80305 DRUG TEST PRSMV DIR OPT OBS: CPT

## 2023-05-10 PROCEDURE — 90791 PSYCH DIAGNOSTIC EVALUATION: CPT

## 2023-05-10 ASSESSMENT — ANXIETY QUESTIONNAIRES
2. NOT BEING ABLE TO STOP OR CONTROL WORRYING: 3
1. FEELING NERVOUS, ANXIOUS, OR ON EDGE: 3
7. FEELING AFRAID AS IF SOMETHING AWFUL MIGHT HAPPEN: 3
6. BECOMING EASILY ANNOYED OR IRRITABLE: 3
5. BEING SO RESTLESS THAT IT IS HARD TO SIT STILL: 3
4. TROUBLE RELAXING: 3
3. WORRYING TOO MUCH ABOUT DIFFERENT THINGS: 3
GAD7 TOTAL SCORE: 21
IF YOU CHECKED OFF ANY PROBLEMS ON THIS QUESTIONNAIRE, HOW DIFFICULT HAVE THESE PROBLEMS MADE IT FOR YOU TO DO YOUR WORK, TAKE CARE OF THINGS AT HOME, OR GET ALONG WITH OTHER PEOPLE: EXTREMELY DIFFICULT
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
## 2023-05-10 NOTE — CARE COORDINATION
Patient Current Location: Home: [facility] [address]     Phone call to Pt to follow up regarding utility assistance mickey and process of applying for SSDI. Pt confirmed he received the utility assistance application in the mail, denied having any questions. Pt reported he has not yet contacted SSA to apply for SSDI and that his mental health therapist is going to assist him. Pt denied any other needs or concerns at this time. Discussed plans to reach out in 3 weeks to follow up and see if he has any questions or concerns. Pt was in agreement.     KATYA plan of care:  SW will make next outreach to Pt in 3 weeks 5/31 to follow up regarding

## 2023-05-11 ENCOUNTER — CARE COORDINATION (OUTPATIENT)
Dept: CASE MANAGEMENT | Age: 43
End: 2023-05-11

## 2023-05-11 NOTE — CARE COORDINATION
morning. Will Continue to monitor. Education of patient/family/caregiver/guardian to support self-management-    Instructed patient on the importance of weighing daily, in the morning after urinating, Same Clothing on, NO shoes, Scale on Flat/hard surface, and that if the patient has weight gain of 3# over night or 5# weight gain in a week to call their physician immediately and report. Plan/Follow Up: Will continue to review, monitor and address alerts with follow up based on severity of symptoms and risk factors.     Current Patient Metrics ---- Blood Pressure: 144/85, 110bpm Pulseox: 97%, 113bpm Survey: C Weight: 210.8lbs Note Created at: 05/11/2023 03:39 PM ET ---- Time-Spent: 3 minutes 0 seconds

## 2023-05-15 ENCOUNTER — HOSPITAL ENCOUNTER (OUTPATIENT)
Dept: PSYCHIATRY | Age: 43
Setting detail: THERAPIES SERIES
Discharge: HOME | End: 2023-05-15
Payer: MEDICAID

## 2023-05-15 PROCEDURE — H2020 THER BEHAV SVC, PER DIEM: HCPCS

## 2023-05-15 NOTE — GROUP NOTE
[facility] Group Therapy Note      5/15/2023    Location:  [place]      Clients Presents: [phone]     Clients Absent: [phone]    Length of session: 3.0 hours    Group Note: IOP    Group Type: Co-Ed    New members were welcomed and introduced. Norms and expectations of group were discussed. Content: GROUP CHECKED-IN  TOPIC:  \"Disease Concept of Addiction\"  Clients learned the medical definition of addiction. They learned that the disease of addiction is a bio-psycho-social disease which affects all areas of a drug-dependent person's life. Laurie Davison  9/60/5044 77:02 PM    Co-Therapist: N/A      [facility] Individual Group Progress Note    Maritza Gordon  1980  5/15/2023    Notes on Client Progress in Group    Agustina Manzo is back in the IOP group. He participated in the group discussion. He stated, \"I know about the disease concept but it is also a mind game! \"    Laurie Davison  6/50/4318 07:44 PM    Co-Therapist: N/A

## 2023-05-16 ENCOUNTER — OFFICE VISIT (OUTPATIENT)
Dept: FAMILY MEDICINE CLINIC | Age: 43
End: 2023-05-16
Payer: MEDICAID

## 2023-05-16 VITALS
HEIGHT: 70 IN | HEART RATE: 115 BPM | SYSTOLIC BLOOD PRESSURE: 130 MMHG | TEMPERATURE: 97.4 F | BODY MASS INDEX: 30.41 KG/M2 | DIASTOLIC BLOOD PRESSURE: 90 MMHG | OXYGEN SATURATION: 97 % | WEIGHT: 212.4 LBS

## 2023-05-16 DIAGNOSIS — F41.9 ANXIETY: ICD-10-CM

## 2023-05-16 DIAGNOSIS — F33.1 MODERATE EPISODE OF RECURRENT MAJOR DEPRESSIVE DISORDER (HCC): ICD-10-CM

## 2023-05-16 DIAGNOSIS — G25.81 RLS (RESTLESS LEGS SYNDROME): ICD-10-CM

## 2023-05-16 DIAGNOSIS — F90.2 ATTENTION DEFICIT HYPERACTIVITY DISORDER (ADHD), COMBINED TYPE: Primary | ICD-10-CM

## 2023-05-16 PROCEDURE — 3075F SYST BP GE 130 - 139MM HG: CPT | Performed by: FAMILY MEDICINE

## 2023-05-16 PROCEDURE — 99214 OFFICE O/P EST MOD 30 MIN: CPT | Performed by: FAMILY MEDICINE

## 2023-05-16 PROCEDURE — 3080F DIAST BP >= 90 MM HG: CPT | Performed by: FAMILY MEDICINE

## 2023-05-16 RX ORDER — DEXTROAMPHETAMINE SACCHARATE, AMPHETAMINE ASPARTATE, DEXTROAMPHETAMINE SULFATE AND AMPHETAMINE SULFATE 5; 5; 5; 5 MG/1; MG/1; MG/1; MG/1
20 TABLET ORAL DAILY
Qty: 30 TABLET | Refills: 0 | Status: SHIPPED | OUTPATIENT
Start: 2023-07-20 | End: 2023-08-19

## 2023-05-16 RX ORDER — DEXTROAMPHETAMINE SACCHARATE, AMPHETAMINE ASPARTATE, DEXTROAMPHETAMINE SULFATE AND AMPHETAMINE SULFATE 5; 5; 5; 5 MG/1; MG/1; MG/1; MG/1
20 TABLET ORAL DAILY
Qty: 30 TABLET | Refills: 0 | Status: SHIPPED | OUTPATIENT
Start: 2023-05-21 | End: 2023-06-20

## 2023-05-16 RX ORDER — BUPROPION HYDROCHLORIDE 300 MG/1
TABLET ORAL
Qty: 90 TABLET | Refills: 1 | Status: SHIPPED | OUTPATIENT
Start: 2023-05-16

## 2023-05-16 RX ORDER — ROPINIROLE 1 MG/1
1 TABLET, FILM COATED ORAL NIGHTLY
Qty: 90 TABLET | Refills: 1 | Status: SHIPPED | OUTPATIENT
Start: 2023-05-16

## 2023-05-16 RX ORDER — DEXTROAMPHETAMINE SACCHARATE, AMPHETAMINE ASPARTATE, DEXTROAMPHETAMINE SULFATE AND AMPHETAMINE SULFATE 5; 5; 5; 5 MG/1; MG/1; MG/1; MG/1
20 TABLET ORAL DAILY
Qty: 30 TABLET | Refills: 0 | Status: SHIPPED | OUTPATIENT
Start: 2023-06-20 | End: 2023-07-20

## 2023-05-16 ASSESSMENT — ENCOUNTER SYMPTOMS
NAUSEA: 0
DIARRHEA: 0
VOMITING: 0

## 2023-05-16 ASSESSMENT — COLUMBIA-SUICIDE SEVERITY RATING SCALE - C-SSRS
2. HAVE YOU ACTUALLY HAD ANY THOUGHTS OF KILLING YOURSELF?: NO
1. WITHIN THE PAST MONTH, HAVE YOU WISHED YOU WERE DEAD OR WISHED YOU COULD GO TO SLEEP AND NOT WAKE UP?: NO
6. HAVE YOU EVER DONE ANYTHING, STARTED TO DO ANYTHING, OR PREPARED TO DO ANYTHING TO END YOUR LIFE?: NO

## 2023-05-16 ASSESSMENT — PATIENT HEALTH QUESTIONNAIRE - PHQ9
SUM OF ALL RESPONSES TO PHQ9 QUESTIONS 1 & 2: 4
8. MOVING OR SPEAKING SO SLOWLY THAT OTHER PEOPLE COULD HAVE NOTICED. OR THE OPPOSITE, BEING SO FIGETY OR RESTLESS THAT YOU HAVE BEEN MOVING AROUND A LOT MORE THAN USUAL: 0
SUM OF ALL RESPONSES TO PHQ QUESTIONS 1-9: 13
9. THOUGHTS THAT YOU WOULD BE BETTER OFF DEAD, OR OF HURTING YOURSELF: 0
1. LITTLE INTEREST OR PLEASURE IN DOING THINGS: 3
5. POOR APPETITE OR OVEREATING: 0
4. FEELING TIRED OR HAVING LITTLE ENERGY: 3
6. FEELING BAD ABOUT YOURSELF - OR THAT YOU ARE A FAILURE OR HAVE LET YOURSELF OR YOUR FAMILY DOWN: 1
3. TROUBLE FALLING OR STAYING ASLEEP: 3
SUM OF ALL RESPONSES TO PHQ QUESTIONS 1-9: 13
2. FEELING DOWN, DEPRESSED OR HOPELESS: 1
SUM OF ALL RESPONSES TO PHQ QUESTIONS 1-9: 13
SUM OF ALL RESPONSES TO PHQ QUESTIONS 1-9: 13
7. TROUBLE CONCENTRATING ON THINGS, SUCH AS READING THE NEWSPAPER OR WATCHING TELEVISION: 2

## 2023-05-16 NOTE — PROGRESS NOTES
5/16/23    Noris Joellen  1980    BRYAN    HPI - Tana Kasper is a 40 y.o. male who presents today for evaluation of:  Chief Complaint   Patient presents with    1 Month Follow-Up     Depression    Depression :   Mood is better than a month ago. He is now working a prt time job on weekends. Working by self is easier. It's a temp  job. PHQ Scores 5/16/2023 4/18/2023 3/2/2022   PHQ2 Score 4 6 2   PHQ9 Score 13 19 2   Interpretation of Total Score Depression Severity: 1-4 = Minimal depression, 5-9 = Mild depression, 10-14 = Moderate depression, 15-19 = Moderately severe depression, 20-27 = Severe depression  He has a mental health doctor who has started [place]    Anxiety : he thinks he got dizzy with buspirone. He found some old medicines for anxiety that started with the B and wonders if I could prescribe more    ADD : Vyvanse 70 mg with Adderalll 20 mg in pm helps with maintaining mental focus. Review of Systems   Cardiovascular:  Negative for chest pain and palpitations. Gastrointestinal:  Negative for diarrhea, nausea and vomiting. Psychiatric/Behavioral:  Negative for self-injury and suicidal ideas. The patient is nervous/anxious. Allergies   Allergen Reactions    Seasonal Itching and Other (See Comments)        OBJECTIVE    BP (!) 130/90 (Site: Right Upper Arm, Position: Sitting, Cuff Size: Large Adult)   Pulse (!) 115   Temp 97.4 °F (36.3 °C) (Infrared)   Ht 5' 10\" (1.778 m)   Wt 212 lb 6.4 oz (96.3 kg)   SpO2 97%   BMI 30.48 kg/m²     Physical Exam   Constitutional:       General: Not in acute distress. Appearance: Normal appearance. Not ill-appearing. Eyes:      General: No scleral icterus. Cardiovascular:      Rate and Rhythm: Normal rate and regular rhythm. Heart sounds: No murmur heard. No friction rub. No gallop. Pulmonary:      Effort: Pulmonary effort is normal. No respiratory distress. Breath sounds: No wheezing, rhonchi or rales.    Abdominal:

## 2023-05-16 NOTE — ASSESSMENT & PLAN NOTE
Noting continued anxiety. He is working with a mental health professional as well as myself.   He did not want a prescription of buspirone and I cannot think of any other prescriptions for anxiety medicines starting with the B

## 2023-05-16 NOTE — ASSESSMENT & PLAN NOTE
I will put out refills of bupropion to help with his depression. Noted decrease in the PHQ-9 score since last visit.

## 2023-05-17 ENCOUNTER — APPOINTMENT (OUTPATIENT)
Dept: PSYCHIATRY | Age: 43
End: 2023-05-17
Payer: MEDICAID

## 2023-05-17 ENCOUNTER — HOSPITAL ENCOUNTER (OUTPATIENT)
Dept: PSYCHIATRY | Age: 43
Setting detail: THERAPIES SERIES
Discharge: HOME | End: 2023-05-17
Payer: MEDICAID

## 2023-05-17 ENCOUNTER — CARE COORDINATION (OUTPATIENT)
Dept: CARE COORDINATION | Age: 43
End: 2023-05-17

## 2023-05-17 ENCOUNTER — CARE COORDINATION (OUTPATIENT)
Dept: CASE MANAGEMENT | Age: 43
End: 2023-05-17

## 2023-05-17 PROCEDURE — H2020 THER BEHAV SVC, PER DIEM: HCPCS

## 2023-05-17 NOTE — GROUP NOTE
[facility] Group Therapy Note      5/17/2023    Location:  [place]      Clients Presents: [phone]    Clients Absent: [phone]    Length of session: 3.0 hours    Group Note: IOP    Group Type: Co-Ed    New members were welcomed and introduced. Norms and expectations of group were discussed. Content: GROUP CHECKED-IN  TOPIC:  \"Relapse Prevention Planning\"  Clients completed a relapse prevention planning worksheet where they identified their own personal relapse triggers and relapse warning signs. This exercise assisted clients in gaining a better understanding of the relapse process and how to spot it early, before it leads them to an actual return to their addiction. Laurie Rose  0/53/6886 5:74 PM    Co-Therapist: N/A      [facility] Individual Group Progress Note    Michael Rodriguez  1980 5/17/2023    Notes on Client Progress in Group    Talib Shiv participated in the group discussion. He was attentive and completed treatment worksheet. He stated, \"I get frustrated because once again I relapsed and I'm back in treatment! \" Thereasa Mac are changing my medications and I don't know if those meds are going to work for me? \" Client received appropriate feedback from another group member who is dealing with the same issue.     Laurie Rose  4/95/6739 9:16 PM    Co-Therapist: N/A

## 2023-05-17 NOTE — CARE COORDINATION
Remote Patient Monitoring Note      Date/Time:  5/17/2023 3:05 PM  Patient Current Location: [facility]  LPN attempted to contacted patient by telephone regarding yellow alert received for No BP and SpO2 level for 2 days. Background: HTN  Clinical Interventions: Reviewed and followed up on alerts and treatments-     Attempted to reach patient for RPM yellow Alert. Unable to reach patient. Left HIPAA Compliant message on Voice Mail to REMIND patient to put metrics in. Phone number left on Voice Mail to call back. Will continue to follow. Tracy Musa LPN    [phone]  [facility] Coordinator       Plan/Follow Up: Will continue to review, monitor and address alerts with follow up based on severity of symptoms and risk factors.      Current Patient Metrics ---- Blood Pressure: -/-, -bpm Pulseox: -%, -bpm Survey: - Weight: 205.2lbs Note Created at: 05/17/2023 03:07 PM ET ---- Time-Spent: 2 minutes 0 seconds

## 2023-05-19 ENCOUNTER — HOSPITAL ENCOUNTER (OUTPATIENT)
Dept: PSYCHIATRY | Age: 43
Setting detail: THERAPIES SERIES
Discharge: HOME | End: 2023-05-19
Payer: MEDICAID

## 2023-05-19 PROCEDURE — H2020 THER BEHAV SVC, PER DIEM: HCPCS

## 2023-05-19 NOTE — GROUP NOTE
[facility] Group Therapy Note      5/19/2023    Location:  [place]      Clients Presents: [phone]    Clients Absent: [phone]    Length of session: 3.0 hours    Group Note: IOP    Group Type: Co-Ed    New members were welcomed and introduced. Norms and expectations of group were discussed. Content: GROUP CHECKED-IN  TOPIC:  \"The You Before It All Happened\" - When The \"Past You\" Meets The \"Present You\"  Clients explored the person they were in the past, while in their active addiction and their present selves in recovery today. This exercise encouraged clients to see how important it is to put the past in the past, to stay in the present to look forward to a life of ongoing sobriety. Laurie Rose  3/57/9852 99:00 PM    Co-Therapist: N/A      [facility] Group Progress Note    Rosa Isela Recinos  1980 5/19/2023    Notes on Client Progress in Group    Reilly Melo participated in the group discussion. Client was attentive and completed treatment worksheet. He stated, \"The parts of my past that I'd like to bring into the future is maybe my artistic ability. \"     Laurie Rose  4/52/6691 3:15 PM    Co-Therapist: N/A

## 2023-05-22 ENCOUNTER — CARE COORDINATION (OUTPATIENT)
Dept: CASE MANAGEMENT | Age: 43
End: 2023-05-22

## 2023-05-22 ENCOUNTER — HOSPITAL ENCOUNTER (OUTPATIENT)
Dept: PSYCHIATRY | Age: 43
Setting detail: THERAPIES SERIES
Discharge: HOME | End: 2023-05-22
Payer: MEDICAID

## 2023-05-22 NOTE — CARE COORDINATION
Blood Pressure: 135/84, 124bpm Pulseox: 96%, 112bpm Survey: C Weight: 200.6lbs Note Created at: 05/22/2023 03:26 PM ET ---- Time-Spent: 10 minutes 0 seconds

## 2023-05-22 NOTE — GROUP NOTE
[facility] Group Therapy Note      5/22/2023    Location:  [place]      Clients Presents: [phone]    Clients Absent: [phone]    Length of session: 3.0 hours    Group Note: IOP    Group Type: Co-Ed    New members were welcomed and introduced. Norms and expectations of group were discussed. Content: GROUP CHECKED-IN  TOPIC:  \"Stop The Chaos-DVD\" - \"10 Ways To Find New Motivation and Rise Above Roadblocks\"  Clients viewed DVD and dicussed the content. Clients discussed the importance of motivation in recovery and identified 10 strategies to find new motivation in addiction recovery. Laurie Peres  2/47/2288 15:65 PM    Co-Therapist: N/A      [facility] Individual Group Progress Note    Ivelisse Sellers  1980 5/22/2023    Notes on Client Progress in Group    Reason for Absence: CX-Client reported having a migraine.      Laurie Peres  6/10/2821 36:86 PM    Co-Therapist: N/A

## 2023-05-24 ENCOUNTER — HOSPITAL ENCOUNTER (OUTPATIENT)
Dept: PSYCHIATRY | Age: 43
Setting detail: THERAPIES SERIES
Discharge: HOME | End: 2023-05-24
Payer: MEDICAID

## 2023-05-24 NOTE — GROUP NOTE
[facility] Group Therapy Note      5/24/2023    Location:  [place]      Clients Presents: [phone]     Clients Absent: [phone]    Length of session: 3.0 hours    Group Note: IOP    Group Type: Co-Ed    New members were welcomed and introduced. Norms and expectations of group were discussed. Content: GROUP CHECKED-IN  TOPIC:  \"Relapse Prevention Plan-Worksheet\" - \"Coping Skills   Clients completed a treatment worksheet where they identified coping skills, social support and consequences of the outcomes for relapse and sobriety. Clients read and discussed various coping skills (i.e. Diversion Ideas, Building New Habits, Avoiding Triggers/Risky Situations, Managing Emotions/Relaxation, Deep Breathing, Journaling, and Imagery).     Laurie Ruffin  6/05/4680 18:56 PM    Co-Therapist: N/A      [facility] Individual Group Progress Note    Emilia Boo  1980 5/24/2023    Notes on Client Progress in Group    Reason for Absence: DNS - Send Letter of Intent    Laurie Ruffin  5/42/3005 1:90 PM    Co-Therapist: N/A

## 2023-05-25 ENCOUNTER — CARE COORDINATION (OUTPATIENT)
Dept: CASE MANAGEMENT | Age: 43
End: 2023-05-25

## 2023-05-25 NOTE — CARE COORDINATION
recheck HR. Patient has NOT rechecked HR at this time. Will Continue to monitor. Education of patient/family/caregiver/guardian to support self-management-Recheck HR  Instructed to place BP cuff on upper arm snuggly. Sit with feet flat on the floor. Sit quietly and relax for 5 minutes before taking BP. Advised Patient to contact PCP 24/7 regarding CP, SOB, Dizziness, Shakiness, Syncope and any health concerns for early outpatient intervention in an effort to avoid hospitalization. Report any worsening symptoms to PCP and/or Call 911 and/or GO TO  EMERGENCY ROOM if symptoms are severe or worsening. Expresses understanding. Plan/Follow Up: Will continue to review, monitor and address alerts with follow up based on severity of symptoms and risk factors.      Current Patient Metrics ---- Blood Pressure: 153/95, 113bpm Pulseox: 99%, 99bpm Survey: C Weight: 203.8lbs Note Created at: 05/25/2023 03:59 PM ET ---- Time-Spent: 8 minutes 0 seconds

## 2023-05-26 ENCOUNTER — HOSPITAL ENCOUNTER (OUTPATIENT)
Dept: PSYCHIATRY | Age: 43
Setting detail: THERAPIES SERIES
Discharge: HOME | End: 2023-05-26
Payer: MEDICAID

## 2023-05-26 ENCOUNTER — CARE COORDINATION (OUTPATIENT)
Dept: CASE MANAGEMENT | Age: 43
End: 2023-05-26

## 2023-05-26 NOTE — GROUP NOTE
[facility] Group Therapy Note      5/26/2023    Location:  [place]      Clients Presents: [phone]    Clients Absent: [phone]    Length of session: 3.0 hours    Group Note: IOP    Group Type: Co-Ed    New members were welcomed and introduced. Norms and expectations of group were discussed. Content: GROUP CHECKED-IN  TOPIC:  \"What Is Recovery? \" Worksheet  Clients explored the meaning of recovery and learned to understand the difference between abstinence and recovery. Laurie Rose  9/31/7381 94:40 AM    Co-Therapist: N/A      [facility] Individual Group Progress Note    [facility]  1980 5/26/2023    Notes on Client Progress in Group    Reason for Absence: DNS - Client called later in the morning and reported over sleeping again.     Laurie Rose  9/88/7843 27:67 PM    Co-Therapist: N/A

## 2023-05-26 NOTE — CARE COORDINATION
continue to review, monitor and address alerts with follow up based on severity of symptoms and risk factors.     Current Patient Metrics ---- Blood Pressure: 121/91, 135bpm Pulseox: 96%, 122bpm Survey: C Weight: 204.6lbs Note Created at: 05/26/2023 04:07 PM ET ---- Time-Spent: 10 minutes 0 seconds

## 2023-05-29 ENCOUNTER — APPOINTMENT (OUTPATIENT)
Dept: PSYCHIATRY | Age: 43
End: 2023-05-29
Payer: MEDICAID

## 2023-05-30 ENCOUNTER — CARE COORDINATION (OUTPATIENT)
Dept: CASE MANAGEMENT | Age: 43
End: 2023-05-30

## 2023-05-30 NOTE — CARE COORDINATION
Remote Alert Monitoring Note      Date/Time:  5/30/2023 11:45 AM  Patient Current Location: [facility]    LPN attempted to contacted patient by telephone regarding red alert received for pulse ox heart rate (123) and weight increase (5# in 7 days  200.8 to 207.6). Attempted X 2  to reach patient for RPM Red Alert Call. Unable to reach patient. Left HIPAA Compliant message on Voice Mail to call. Phone number left on Voice Mail to call back. Notified ACM  Will continue to follow. Giselle Sellers LPN    [phone]  [facility] Coordinator      Background: HTN  Refer to 911 immediately if:  Patient unresponsive or unable to provide history  Change in cognition or sudden confusion  Patient unable to respond in complete sentences  Intense chest pain/tightness  Any concern for any clinical emergency  Red Alert: Provider response time of 1 hr required for any red alert requiring intervention  Yellow Alert: Provider response time of 3hr required for any escalated yellow alert    Plan/Follow Up: Will continue to review, monitor and address alerts with follow up based on severity of symptoms and risk factors.     Current Patient Metrics ---- Blood Pressure: 122/84, 100bpm Pulseox: 97%, 100bpm Survey: C Weight: 207.6lbs Note Created at: 05/30/2023 03:28 PM ET ---- Time-Spent: 2 minutes 0 seconds

## 2023-05-31 ENCOUNTER — CARE COORDINATION (OUTPATIENT)
Dept: CARE COORDINATION | Age: 43
End: 2023-05-31

## 2023-05-31 ENCOUNTER — APPOINTMENT (OUTPATIENT)
Dept: PSYCHIATRY | Age: 43
End: 2023-05-31
Payer: MEDICAID

## 2023-05-31 PROCEDURE — H2020 THER BEHAV SVC, PER DIEM: HCPCS

## 2023-05-31 NOTE — CARE COORDINATION
Attempted phone call to Pt to follow up regarding utility assistance and applying for SSI. No answer, voicemail message left requesting return call, contact number provided. KATYA plan of care: SW will make next outreach attempt on 6/6 to follow up regarding Utility assistance and Applying for SSI.

## 2023-05-31 NOTE — GROUP NOTE
[facility] Group Therapy Note      5/31/2023    Location:  [place]      Clients Presents: [phone]    Clients Absent: [phone]    Length of session: 3.0 hours    Group Note: IOP    Group Type: Co-Ed    New members were welcomed and introduced. Norms and expectations of group were discussed. Content: GROUP CHECKED-IN  TOPIC:  \"Acceptance\"  Clients discussed the importance of acceptance in working a successful recovery program and why acceptance is a challenge for many in recovery. Laurie Rose  7/94/2517 8:24 PM    Co-Therapist: N/A      [facility] Individual Group Progress Note    Scott Lin  1980 5/31/2023    Notes on Client Progress in Group    Santos Carter participated in the group discussion. He was quiet in group today and stated, \"My use progressed to using meth in the past few months and I thought that I would never use it! \" \"I've come to accept that I am an addict but I don't like that I am!\"    Laurie Rose  0/89/4353 0:50 PM    Co-Therapist: N/A

## 2023-05-31 NOTE — CARE COORDINATION
ACM received secure message from [place], [place] RPM team 5/39 at 3:34pm re: Red alert d/t weight gain of 5 pounds in 7 days and elevated HR of 123. VS from this AM reviewed. All alerting green. Weight - Scale: 203 lb (92.1 kg), BP: (!) 143/88    ACM call to pt for Care Coordination follow up. No answer. Left HIPAA compliant message on voicemail requesting return call to ACM.

## 2023-06-02 ENCOUNTER — TELEPHONE (OUTPATIENT)
Dept: PRIMARY CARE CLINIC | Age: 43
End: 2023-06-02

## 2023-06-02 ENCOUNTER — HOSPITAL ENCOUNTER (OUTPATIENT)
Dept: PSYCHIATRY | Age: 43
Setting detail: THERAPIES SERIES
Discharge: HOME | End: 2023-06-02
Payer: MEDICAID

## 2023-06-02 ENCOUNTER — CARE COORDINATION (OUTPATIENT)
Dept: CASE MANAGEMENT | Age: 43
End: 2023-06-02

## 2023-06-02 PROCEDURE — H2020 THER BEHAV SVC, PER DIEM: HCPCS

## 2023-06-02 NOTE — TELEPHONE ENCOUNTER
06/02/23 11:35 AM     REMOTE PATIENT MONITORING HIGH ALERT RESPONSE     11:41 AM Attempted twice to reach pt but he is not answering the phone at this time. Left message introducing myself as the nurse practitioner with the [facility] remote patient monitoring program and that I would like to talk to him for a few minutes about the alert we received today for weight gain. Left my phone # in message and requested a return call at his earliest convenience. Advised if I don't hear from him first, I'll call again around 1 PM today. 12:16 PM Pt called back but I was unable to speak with him. Advised I would call him again as soon as possible. 4:26 PM Called pt back and we had a brief conversation. ASSESSMENT AND PLAN   Weight gain  --weight monitoring has been deactivated on pt's RPM care plan in keeping with original monitoring request with RPM referral    CHIEF COMPLAINT    Responding to a high RPM alert for weight gain of 11.8# in 1 day and over the past 7 days. SAGAR Lima is a 40 y.o. male who presents this afternoon feeling well. States he does not have any symptoms. I asked if he has any questions or if there is anything he wants us to know. He says he doesn't think so at this time. I advised we will not be monitoring his weight going forward and apologized for any inconvenience this has caused. I thanked him for his time and we disconnected. CURRENT MEDICATIONS    Current Outpatient Rx   Medication Sig Dispense Refill    [START ON 7/20/2023] amphetamine-dextroamphetamine (ADDERALL, 20MG,) 20 MG tablet Take 1 tablet by mouth daily for 30 days. Max Daily Amount: 20 mg 30 tablet 0    [START ON 7/20/2023] lisdexamfetamine (VYVANSE) 70 MG capsule Take 1 capsule by mouth every morning for 30 days. Max Daily Amount: 70 mg 30 capsule 0    [START ON 6/20/2023] lisdexamfetamine (VYVANSE) 70 MG capsule Take 1 capsule by mouth every morning for 30 days.  30 capsule 0    lisdexamfetamine (VYVANSE) 70 MG

## 2023-06-02 NOTE — GROUP NOTE
[facility] Group Therapy Note      6/2/2023    Location:  [facility] Presents: [phone]    Clients Absent: [phone]    Length of session: 3.0 hours    Group Note: IOP    Group Type: Co-Ed    New members were welcomed and introduced. Norms and expectations of group were discussed. Content: Group Checked-In  TOPIC:  \"4 Reasons Why Acceptance is Essential to Your Recovery\"  Clients learned the importance of practicing acceptance in ways that support their own recovery by:  Understanding the importance of acceptance, Recognizing the gifts of acceptance, Embracing the freedom of acceptance, and After acceptance-comes gratitude. Laurie Ball  4/4/9262 06:37 PM    Co-Therapist: N/A      [facility] Individual Group Progress Note    Silvia Vallejo  1980 6/2/2023    Notes on Client Progress in Group    Wilson Thompson participated in the group discussion. Gricelearl Thompson shared that he is still concerned about changing his mental health medications and has a phone appointment with his MHP this afternoon. He was asked to bring in a medication list from his pharmacy for his file.     Laurie Ball  9/1/3353 1:34 PM    Co-Therapist: N/A

## 2023-06-02 NOTE — CARE COORDINATION
Next PCP appointment 8/16/23. Patient is not having any issues at this time. He is not home to re-weigh himself at this time. Will escalate to PCP for weight gain. No response form PCP. Will escalate to Marisela Chowdary CNP for weight gain. Escalated alert to PCP-Dr. Rohith Harley  Education of patient/family/caregiver/guardian to support self-management-Reviewed weight instructions   Instructed patient on the importance of weighing daily, in the morning after urinating, Same Clothing on, NO shoes, Scale on Flat/hard surface, and that if the patient has weight gain of 3# over night or 5# weight gain in a week to call their physician immediately and report. Advised Patient to contact PCP 24/7 regarding CP, SOB, Swelling and any health concerns for early outpatient intervention in an effort to avoid hospitalization. Report any worsening symptoms to PCP and/or Call 911 and/or GO TO  EMERGENCY ROOM if symptoms are severe or worsening. Expresses understanding. Plan/Follow Up: Will continue to review, monitor and address alerts with follow up based on severity of symptoms and risk factors.     Current Patient Metrics ---- Blood Pressure: 136/83, 94bpm Pulseox: 98%, 87bpm Survey: C Weight: 211.0lbs Note Created at: 06/02/2023 10:52 AM ET ---- Time-Spent: 15 minutes 0 seconds

## 2023-06-05 ENCOUNTER — HOSPITAL ENCOUNTER (OUTPATIENT)
Dept: PSYCHIATRY | Age: 43
Setting detail: THERAPIES SERIES
Discharge: HOME | End: 2023-06-05
Payer: MEDICAID

## 2023-06-05 ENCOUNTER — CARE COORDINATION (OUTPATIENT)
Dept: CASE MANAGEMENT | Age: 43
End: 2023-06-05

## 2023-06-05 PROCEDURE — H2020 THER BEHAV SVC, PER DIEM: HCPCS

## 2023-06-05 PROCEDURE — 80305 DRUG TEST PRSMV DIR OPT OBS: CPT

## 2023-06-05 NOTE — CARE COORDINATION
Remote Patient Monitoring Note      Date/Time:  2023 3:37 PM  [facility] yellow alert received for No VS for 3 days. Verified patients name and  as identifiers. Background: HTN  Clinical Interventions: Reviewed and followed up on alerts and treatments-     Called and reminded patient to put in VS for RPM.  Patient said he has been putting them in except for today. He is not home at the moment but will try to put VS in.      Plan/Follow Up: Will continue to review, monitor and address alerts with follow up based on severity of symptoms and risk factors.      Current Patient Metrics ---- Blood Pressure: -/-, -bpm Pulseox: -%, -bpm Survey: - Note Created at: 2023 03:39 PM ET ---- Time-Spent: 1 minutes 0 seconds

## 2023-06-05 NOTE — GROUP NOTE
[facility] Group Therapy Note      6/5/2023    Location:  [place]      Clients Presents: [phone]    Clients Absent: 1410    Length of session: 3.0 hours    Group Note: IOP    Group Type: Co-Ed    New members were welcomed and introduced. Norms and expectations of group were discussed. Content: GROUP CHECKED-IN  TOPIC:  \"Drugs, Brains, and [facility] of Addiction\"  Clients read discussed booklet about the interaction of drugs and the brain. This exercise assisted clients in cultivating an increased understanding of the basics of addiction and to empower them to make informed choices in their own lives about the importance of living in recovery. Laurie Rose  3/9/9608 6:28 PM    Co-Therapist: N/A      [facility] Individual Group Progress Note    Braden Baca  1980 6/5/2023    Notes on Client Progress in Group    John Larsen participated in the group discussion. He was attentive and stated, \"I don't know why I keep going through the revolving door of treatment? \" He voiced concerns about where he should be at in his own life and stated, \"People my age have a career, a savings account and something to look forward to in the future-I have nothing to show for it because of this disease! \" He received appropriate feedback from other group members who reminded him of what he has now (kids, girlfriend, his mother etc.).     Laurie Rose  1/6/5037 7:97 PM    Co-Therapist: N/A

## 2023-06-06 ENCOUNTER — CARE COORDINATION (OUTPATIENT)
Dept: CARE COORDINATION | Age: 43
End: 2023-06-06

## 2023-06-06 NOTE — CARE COORDINATION
Patient Current Location: Home: [facility] [address]     Phone call to Pt to follow up regarding utility assistance and SSI. Pt reported he has an interview set up in July for SSI and has submitted his utility assistance application. Pt reported he has not yet heard from [facility]. SW encouraged pt to call them to schedule the interview. SW offered to provide the number and Pt reported he has it. KATYA plan of care:  SW will follow up with Pt in 3 weeks 6/27 regarding utility assistance.

## 2023-06-07 ENCOUNTER — HOSPITAL ENCOUNTER (OUTPATIENT)
Dept: PSYCHIATRY | Age: 43
Setting detail: THERAPIES SERIES
Discharge: HOME | End: 2023-06-07
Payer: MEDICAID

## 2023-06-07 PROCEDURE — H2020 THER BEHAV SVC, PER DIEM: HCPCS

## 2023-06-07 NOTE — GROUP NOTE
[facility] Group Therapy Note      6/7/2023    Location:  [place]      Clients Presents: [phone]    Clients Absent: [phone]    Length of session: 3.0 hours    Group Note: IOP    Group Type: Co-Ed    New members were welcomed and introduced. Norms and expectations of group were discussed. Content: GROUP CHECKED-IN  TOPIC:  \"20 Minute Autobiography\"  Clients completed an exercise where they each had an opportunity to write their own autobiography. Clients were able to read out loud their autobiography with information that they were comfortable with to share with their peers. This exercise prompted clients to think about how resilient they are and to look forward in making future goals in their recovery. Laurie Rose  1/7/4075 80:80 PM    Co-Therapist: N/A      [facility] Individual Group Progress Note    Vern Clark  1980 6/7/2023    Notes on Client Progress in Group    Emelina Wetzel participated in the group discussion. He reports continued sobriety, attends \"[facility]brate recovery\" a support group weekly, and has a sponsor. Emelina Wetzel was more talkative in group today as he shared his autobiography. He stated, \"I've been in and out of treatment so long and I've applied for disability because with my last years of meth use it has really messed up my mind and I can't be around people for extended periods of time. \"    Laurie Rose  5/5/0193 9:32 PM    Co-Therapist: N/A

## 2023-06-09 ENCOUNTER — HOSPITAL ENCOUNTER (OUTPATIENT)
Dept: PSYCHIATRY | Age: 43
Setting detail: THERAPIES SERIES
Discharge: HOME | End: 2023-06-09
Payer: MEDICAID

## 2023-06-09 ENCOUNTER — CARE COORDINATION (OUTPATIENT)
Dept: CARE COORDINATION | Age: 43
End: 2023-06-09

## 2023-06-09 PROCEDURE — H2020 THER BEHAV SVC, PER DIEM: HCPCS

## 2023-06-09 NOTE — GROUP NOTE
[facility] Group Therapy Note      6/9/2023    Location:  [place]      Clients Presents: [phone]    Clients Absent: [phone]    Length of session: 3.0 hours    Group Note: IOP    Group Type: Co-Ed    New members were welcomed and introduced. Norms and expectations of group were discussed. Content: GROUP CHECKED-IN  TOPIC:  \"Dealing With Relapse\"   Clients learned about the process of relapse. Clients examined a time when they had relapsed and identified their process (Before, Next, and the Last Straw). Clients also explored what they would have done differently to avoid relapse. Laurie Rose  4/4/8625 9:32 PM    Co-Therapist: N/A      [facility] Group Progress Note    Jason Mcpherson  1980 6/9/2023    Notes on Client Progress in Group    [place] participated in the group discussion. He was attentive, shared about his last relapse, and identified the relapse sequence. He stated, \"When I get in my head and get paranoid I seem to spiral quickly. \"    Laurie Rose  0/1/7966 2:82 PM    Co-Therapist: N/A

## 2023-06-12 ENCOUNTER — APPOINTMENT (OUTPATIENT)
Dept: PSYCHIATRY | Age: 43
End: 2023-06-12
Payer: MEDICAID

## 2023-06-12 NOTE — GROUP NOTE
[facility] Group Therapy Note      6/12/2023    Location:  [place]      Clients Presents: [phone]    Clients Absent: 1410    Length of session: 3.0 hours    Group Note: IOP    Group Type: Co-Ed    New members were welcomed and introduced. Norms and expectations of group were discussed. Content: Counselor facilitated client check in status with focus on stressor and coping skills to avoid relapse. Counselor presented topic focused discussion on grieving the loss of addiction. [facility]  6/12/2023 8:36 AM    Co-Therapist: N/A      [facility] Group Progress Note    Cena Apley  1980 6/12/2023    Notes on Client Progress in Group    Client shared he is making progress on his goals in treatment and shared he is almost 60 days sober from Meth. He shared he has grieved the loss of substances and reports he misses feeling high but not the damage drugs did to his life.      [facility]  6/12/2023 10:45 AM    Co-Therapist: N/A

## 2023-06-14 ENCOUNTER — APPOINTMENT (OUTPATIENT)
Dept: PSYCHIATRY | Age: 43
End: 2023-06-14
Payer: MEDICAID

## 2023-06-14 NOTE — GROUP NOTE
[facility] Group Therapy Note      6/14/2023    Location:  [place]      Clients Presents: [phone]    Clients Absent: [phone]    Length of session: 3.0 hours    Group Note: IOP    Group Type: Co-Ed    New members were welcomed and introduced. Norms and expectations of group were discussed. Content: GROUP CHECKED-IN  TOPIC: \"Serenity Prayer - \"Wants Vs. Needs in Recovery\"    Clients recited the Serenity Prayer and discussed what it means to them. Clients completed a worksheet where they differentiated between their wants and their needs. This exercise assisted clients, when making decisions, their needs should be prioritized over their wants. Laurie Rose  8/88/2831 52:27 PM    Co-Therapist: N/A      [facility] Group Progress Note    Virgil Beckman  1980 6/14/2023    Notes on Client Progress in Group    Yarelis Hernandez participated in the group discussion. He was attentive, completed treatment worksheet, and stated, \"My girlfriend has helped me with prioritizing my needs and wants because I am really impulsive especially with money. \"    Laurie Rose  7/89/1789 2:72 PM    Co-Therapist: N/A

## 2023-06-16 ENCOUNTER — APPOINTMENT (OUTPATIENT)
Dept: PSYCHIATRY | Age: 43
End: 2023-06-16
Payer: MEDICAID

## 2023-06-16 NOTE — GROUP NOTE
[facility] Group Therapy Note      6/16/2023    Location:  [place]      Clients Presents: [phone]    Clients Absent: [phone]    Length of session: 3.0 hours    Group Note: IOP    Group Type: Co-Ed    New members were welcomed and introduced. Norms and expectations of group were discussed. Content: GROUP CHECKED-IN  TOPIC:  \"Shame: Causes, Affects, & How to Overcome\"  Clients read and discussed a hand-out on the differences between shame and guilt. Clients also explored the causes of shame, its affects and how to overcome shame. This exercise assisted clients in recognizing how one can  interrupt self-criticism and guard against shame-driven responses. Laurie Rose  6/53/3517 13:57 PM    Co-Therapist: N/A      [facility] Group Progress Note    Luis Carlos Murders  1980 6/16/2023    Notes on Client Progress in Group    Ignacio Gist participated in the group discussion. He was attentive and shared minimally. He stated, \"I carry a lot of shame and get seriously depressed where I can't function. \" \"That's where my girlfriend steps in and helps me in making sure I take my medications and go to [facility] appointments. \"    Laurie Rose  2/94/4642 6:36 PM    Co-Therapist: N/A

## 2023-06-19 ENCOUNTER — APPOINTMENT (OUTPATIENT)
Dept: PSYCHIATRY | Age: 43
End: 2023-06-19
Payer: MEDICAID

## 2023-06-19 ENCOUNTER — CARE COORDINATION (OUTPATIENT)
Dept: CASE MANAGEMENT | Age: 43
End: 2023-06-19

## 2023-06-19 PROCEDURE — H2020 THER BEHAV SVC, PER DIEM: HCPCS

## 2023-06-19 NOTE — CARE COORDINATION
Remote Patient Monitoring Note      Date/Time:  6/19/2023 2:40 PM  Patient Current Location: [facility]  LPN attempted to contacted patient by telephone regarding yellow alert received for No VS for 4 days. Background: HTN  Clinical Interventions: Reviewed and followed up on alerts and treatments-     Attempted to reach patient for RPM yellow Alert. Unable to reach patient. Left HIPAA Compliant message on Voice Mail to REMIND patient to put metrics in. Phone number left on Voice Mail to call back. Sent Message to [employer]. Will continue to follow. Hodan Tate LPN    [phone]  Aster Johnson / [facility] Coordinator       Plan/Follow Up: Will continue to review, monitor and address alerts with follow up based on severity of symptoms and risk factors.       Current Patient Metrics ---- Blood Pressure: -/-, -bpm Pulseox: -%, -bpm Survey: - Note Created at: 06/19/2023 02:41 PM ET ---- Time-Spent: 2 minutes 0 seconds

## 2023-06-19 NOTE — GROUP NOTE
[facility] Group Therapy Note      6/19/2023    Location:  [place]      Clients Presents:  [phone]     Clients Absent: [phone]    Length of session: 3.0 hours    Group Note: [facility]    Group Type: Co-Ed    New members were welcomed and introduced. Norms and expectations of group were discussed. Content: GROUP CHECKED-IN  TOPIC:  \"Values Clarification and Self-Exploration\"  Clients completed a packet where they explored their wilda in nine areas. Clients learned about the importance of making healthy choices in their own recovery. Laurie Vargas  4/01/2047 87:78 AM    Co-Therapist: N/A      [facility] Group Progress Note    Yanique Perkins  1980 6/19/2023    Notes on Client Progress in Group    Kadi Owusu participated in the group discussion. He stated, \"This discussion is very hard for me because I should be doing something worthwhile in my life, instead of waisting my life dealing with these drugs! \"     Laurie Valle  2/20/6691 4:77 PM    Co-Therapist: N/A

## 2023-06-20 ENCOUNTER — CARE COORDINATION (OUTPATIENT)
Dept: CARE COORDINATION | Age: 43
End: 2023-06-20

## 2023-06-20 ENCOUNTER — CARE COORDINATION (OUTPATIENT)
Dept: CASE MANAGEMENT | Age: 43
End: 2023-06-20

## 2023-06-20 VITALS
HEART RATE: 109 BPM | WEIGHT: 199.2 LBS | OXYGEN SATURATION: 96 % | SYSTOLIC BLOOD PRESSURE: 139 MMHG | BODY MASS INDEX: 28.58 KG/M2 | DIASTOLIC BLOOD PRESSURE: 91 MMHG

## 2023-06-20 NOTE — CARE COORDINATION
[facility] States she is doing fine. ACM inquired about RPM and is pt would like to d/c RPM. Pt agreeable to end RPM. ACM will complete order and route to RPM team.     Pt reports he has been in contact with [facility] and they are assisting with utilities. ACM will alert Scotyt Junior. Pt states he continues to see provider at [facility]. Confirmed PCP appt 8/16. Pt denies the need for ongoing CC. States she is doing good. Compliant with appts. Has ACM contact information for any future needs. Remote Patient Monitoring Disenrollment      Date/Time:  6/20/2023 11:06 AM  Patient [place] Location[place]  Patient has been dis-enrolled from Remote Patient Monitoring program on 6/20/2023 due to  pt has been enrolled 2 months and no longer wants to participate . Patient has been provided instruction on process to return RPM equipment and RPM has been deactivated. Patient has ACM's contact information for any further questions, concerns, or needs.

## 2023-06-20 NOTE — CARE COORDINATION
EMTP placed call to patient to arrange RPM kit  through [facility]. Reviewed with patient how to pack equipment in original packing. Verified patient's availability to schedule UPS  time. UPS  time requested.  Anticipated  date range : Anytime

## 2023-06-20 NOTE — CARE COORDINATION
ACM received notification from [facility] RPM team d/t no VS x5 days for pt. ACM call to pt for CC outreach. No answer. Left VM requesting return call from pt. Unable to reach [employer] message send this date to pt. Plan to discuss RPM d/c and grad from CC if able to reach pt.

## 2023-06-20 NOTE — CARE COORDINATION
SW was notified by Sofi Schwab that Pt is in the process of obtaining assistance through [facility], no further needs at this time. SW plan of care: SW will resolve from  services at this time.

## 2023-06-21 ENCOUNTER — HOSPITAL ENCOUNTER (OUTPATIENT)
Dept: PSYCHIATRY | Age: 43
Setting detail: THERAPIES SERIES
Discharge: HOME | End: 2023-06-21
Payer: MEDICAID

## 2023-06-21 ENCOUNTER — APPOINTMENT (OUTPATIENT)
Dept: PSYCHIATRY | Age: 43
End: 2023-06-21
Payer: MEDICAID

## 2023-06-21 PROCEDURE — H2020 THER BEHAV SVC, PER DIEM: HCPCS

## 2023-06-21 NOTE — GROUP NOTE
[facility] Group Therapy Note      6/21/2023    Location:  [facility]      Clients Presents: [id number], [facility], [facility] [phone], [phone]      Clients Absent:     Length of session: 1.5 hours      Group Note: OP      Group Type: Co-Ed    New members were welcomed and introduced. Norms and expectations of group were discussed. Content: Understanding Toxic and Dysfunctional Families: Impacted by Substance Use         VIRGINIA Daigle  5/12/9057 7:93 PM      Co-Therapist: N/A      [facility] Individual Group Progress Note    Amarjit Choudhury  1980 6/21/2023    Notes on Client Progress in [address] attended group, introduced herself and events leading to arrival: presented check in information: expressed his substance use has impacted his children and significant other.           VIRGINIA Daigle  6/04/3164 7:76 PM        Co-Therapist: N/A

## 2023-06-23 ENCOUNTER — HOSPITAL ENCOUNTER (OUTPATIENT)
Dept: PSYCHIATRY | Age: 43
Setting detail: THERAPIES SERIES
Discharge: HOME | End: 2023-06-23
Payer: MEDICAID

## 2023-06-23 ENCOUNTER — APPOINTMENT (OUTPATIENT)
Dept: PSYCHIATRY | Age: 43
End: 2023-06-23
Payer: MEDICAID

## 2023-06-23 PROCEDURE — H2020 THER BEHAV SVC, PER DIEM: HCPCS

## 2023-06-23 NOTE — GROUP NOTE
[facility] Group Therapy Note      6/23/2023    Location:  [place]      Clients Presents: [phone]    Clients Absent: [phone], [phone]    Length of session: 1.5 hours    Group Note: [facility]    Group Type: Co-Ed    New members were welcomed and introduced. Norms and expectations of group were discussed. Content: Counselor presented on topic of tobacco use, smoking and vaping. We focused discussion on toxins, effects on body, triggers, consequences and steps to quit. Client identified reasons for use/non use. Shashi Jean  6/23/2023 11:20 AM    Co-Therapist: N/A      [facility] Group Progress Note    Alexis Peter  1980 6/23/2023    Notes on Client Progress in [address] attended group, introduced himself and presented check in information:  Client shared he feels like he is making progress on his treatment goals. He denies any use of substances. Client shared his biggest stressor is looking for a full time job. \"I need to focus on my recovery first, but also need full time work. \"  Client stated that he attends [employer] on Thursday evenings and has a sponsor. He stated that he previously smoked, but now he vapes. He explained that the vaping feels harsher on his lungs. Client reports coping skill is sketching, but he doesn't do it often. He stated that he plans to \"get back into it\" and enjoys art.          Shashi Jean  6/23/2023 11:48 AM    Co-Therapist: N/A

## 2023-06-26 ENCOUNTER — APPOINTMENT (OUTPATIENT)
Dept: PSYCHIATRY | Age: 43
End: 2023-06-26
Payer: MEDICAID

## 2023-06-26 PROCEDURE — H2020 THER BEHAV SVC, PER DIEM: HCPCS

## 2023-06-28 ENCOUNTER — APPOINTMENT (OUTPATIENT)
Dept: PSYCHIATRY | Age: 43
End: 2023-06-28
Payer: MEDICAID

## 2023-06-28 PROCEDURE — H2020 THER BEHAV SVC, PER DIEM: HCPCS

## 2023-06-30 ENCOUNTER — APPOINTMENT (OUTPATIENT)
Dept: PSYCHIATRY | Age: 43
End: 2023-06-30
Payer: MEDICAID

## 2023-06-30 PROCEDURE — H2020 THER BEHAV SVC, PER DIEM: HCPCS

## 2023-07-03 ENCOUNTER — HOSPITAL ENCOUNTER (OUTPATIENT)
Dept: PSYCHIATRY | Age: 43
Setting detail: THERAPIES SERIES
Discharge: HOME | End: 2023-07-03
Payer: MEDICAID

## 2023-07-03 PROCEDURE — 80305 DRUG TEST PRSMV DIR OPT OBS: CPT

## 2023-07-03 PROCEDURE — H2020 THER BEHAV SVC, PER DIEM: HCPCS

## 2023-07-03 NOTE — GROUP NOTE
[facility] Group Therapy Note      7/3/2023    Location:  [facility] Presents: [phone]    Clients Absent: 0    Length of session: 3.0 hours    Group Note: IOP    Group Type: Co-Ed    New members were welcomed and introduced. Norms and expectations of group were discussed. Content: GROUP CHECKED-IN  TOPIC:  \"Ingredients To Health Self-Esteem\" - \"Why is Resilience Important in Recovery? \"  Clients learned about four ingredients in having healthy self-esteem (Acceptance, Self-love, Positive Attitude and Commitment). Clients learned the importance of how resilience is a skill that one can increase in recovery from drug and alcohol addiction. [address]  3/4/3027 4:21 PM    Co-Therapist: N/A      [facility] Individual Group Progress Note    Darryl Samaniego  1980  7/3/2023    Notes on Client Progress in Group    Cristhian Juan participated in the group discussion. He was attentive but only responded when prompted to. He stated, \"I have a hard time being good to myself because of what I have done in my active addiction with the many relapses that I have had! \" \"I threw away the pills that I'm not suppose to be taking. \" This provider needs to complete an individual to review his ITP.     Laurie Jordan  0/3/0014 4:88 PM    Co-Therapist: N/A

## 2023-07-05 ENCOUNTER — HOSPITAL ENCOUNTER (OUTPATIENT)
Dept: PSYCHIATRY | Age: 43
Setting detail: THERAPIES SERIES
Discharge: HOME | End: 2023-07-05
Payer: MEDICAID

## 2023-07-05 PROCEDURE — H2020 THER BEHAV SVC, PER DIEM: HCPCS

## 2023-07-05 NOTE — GROUP NOTE
[address] [facility] Group Therapy Note      7/5/2023    Location:  [place]      Clients Presents: [phone]    Clients Absent: [phone]    Length of session: 3.0 hours    Group Note: IOP    Group Type: Co-Ed    New members were welcomed and introduced. Norms and expectations of group were discussed. Content: GROUP CHECKED-IN  TOPIC:  \"Codependency\"  Clients completed an inventory to rate their co-dependency tendencies. Clients learned what co-dependency is. Clients identified specific characteristics of co-dependence and how to look at behaviors to change unhealthy beaviors by: Identifying, Detaching, and Deciding. [address], [facility]  9/8/6586 1:66 PM    Co-Therapist: N/A      [facility] Group Progress Note    Landa Duverney  1980 7/5/2023    Notes on Client Progress in Group    Nataly Zheng participated in the group discussion. He was attentive and found it difficult to complete the treatment assignment but gave appropriate feedback. He stated, \"My co-dependent tendencies are more internal then external.\" \"I think it may be because of my depression diagnosis.     [address], [facility]  2/6/0243 9:04 PM    Co-Therapist: N/A

## 2023-07-07 ENCOUNTER — OFFICE VISIT (OUTPATIENT)
Dept: FAMILY MEDICINE CLINIC | Age: 43
End: 2023-07-07
Payer: MEDICAID

## 2023-07-07 ENCOUNTER — HOSPITAL ENCOUNTER (OUTPATIENT)
Age: 43
Setting detail: SPECIMEN
Discharge: HOME | End: 2023-07-07
Payer: MEDICAID

## 2023-07-07 ENCOUNTER — HOSPITAL ENCOUNTER (OUTPATIENT)
Dept: PSYCHIATRY | Age: 43
Setting detail: THERAPIES SERIES
Discharge: HOME | End: 2023-07-07
Payer: MEDICAID

## 2023-07-07 VITALS
HEART RATE: 100 BPM | OXYGEN SATURATION: 96 % | WEIGHT: 220 LBS | HEIGHT: 70 IN | BODY MASS INDEX: 31.5 KG/M2 | TEMPERATURE: 98 F

## 2023-07-07 DIAGNOSIS — J06.9 VIRAL URI WITH COUGH: Primary | ICD-10-CM

## 2023-07-07 PROCEDURE — 87635 SARS-COV-2 COVID-19 AMP PRB: CPT

## 2023-07-07 PROCEDURE — 99213 OFFICE O/P EST LOW 20 MIN: CPT | Performed by: NURSE PRACTITIONER

## 2023-07-07 RX ORDER — BENZONATATE 100 MG/1
100 CAPSULE ORAL 3 TIMES DAILY PRN
Qty: 20 CAPSULE | Refills: 0 | Status: SHIPPED | OUTPATIENT
Start: 2023-07-07

## 2023-07-07 NOTE — GROUP NOTE
[facility] Group Therapy Note      7/7/2023    Location:  [place]      Clients Presents: [phone]    Clients Absent: [phone]    Length of session: 3.0 hours    Group Note: IOP    Group Type: Co-Ed    New members were welcomed and introduced. Norms and expectations of group were discussed. Content: GROUP CHECKED-IN  TOPIC:  \"Steps to Finding Recovery after Addiction Relapse\"  Clients identified 4 key steps to finding recovery after addiction relapse (1-Reach out for help, 2-Learn what caused the relapsed, 3-Avoid dwelling in guilt and shame, 4-Protect yourself from future relapse).     [address] [place]  9/7/4679 76:35 PM    Co-Therapist: N/A      [facility] Group Progress Note    Nadya Rasmussen  1980 7/7/2023    Notes on Client Progress in Group    Reason for Absence: CX-Client called reported that he was sick     [address] [place]  8/9/9670 2:07 PM    Co-Therapist: N/A

## 2023-07-07 NOTE — PROGRESS NOTES
7/7/2023    HPI:  Chief complaint and history of present illness as per medical assistant/nurse documented today in the Flu/COVID-19 clinic. Patient is here with complaints of cough, chest/nasal congestion, and states chest burns when coughing x 2 days. Patient states he has had his covid vaccine. MEDICATIONS:  Prior to Visit Medications    Medication Sig Taking? Authorizing Provider   benzonatate (TESSALON PERLES) 100 MG capsule Take 1 capsule by mouth 3 times daily as needed for Cough Yes LEIGH Morales - CNP   amphetamine-dextroamphetamine (ADDERALL, 20MG,) 20 MG tablet Take 1 tablet by mouth daily for 30 days. Max Daily Amount: 20 mg  Eric Clements MD   lisdexamfetamine (VYVANSE) 70 MG capsule Take 1 capsule by mouth every morning for 30 days. Max Daily Amount: 70 mg  Eric Clements MD   lisdexamfetamine (VYVANSE) 70 MG capsule Take 1 capsule by mouth every morning for 30 days. Eric Clements MD   lisdexamfetamine (VYVANSE) 70 MG capsule Take 1 capsule by mouth every morning for 30 days. Eric Clements MD   amphetamine-dextroamphetamine (ADDERALL) 20 MG tablet Take 1 tablet by mouth daily for 30 days. Eric Clements MD   amphetamine-dextroamphetamine (ADDERALL) 20 MG tablet Take 1 tablet by mouth daily for 30 days.   Eric Clements MD   buPROPion (WELLBUTRIN XL) 300 MG extended release tablet TAKE 1 TABLET BY MOUTH ONCE DAILY IN THE MORNING  Eric Clements MD   rOPINIRole (REQUIP) 1 MG tablet Take 1 tablet by mouth at bedtime  Eric Clements MD   metoprolol succinate (TOPROL XL) 25 MG extended release tablet Take 1 tablet by mouth daily  Eric Clements MD   lisinopril (PRINIVIL;ZESTRIL) 20 MG tablet TAKE 1 TABLET BY MOUTH ONCE DAILY  Eric Clmeents MD   risperiDONE (RISPERDAL) 3 MG tablet Take 1 tablet by mouth nightly  Eric Clements MD   desvenlafaxine succinate (PRISTIQ) 100 MG TB24 extended release tablet Take 1 tablet by mouth daily  Eric Celments MD

## 2023-07-07 NOTE — PATIENT INSTRUCTIONS
Your COVID 19 test can take 1-5 days for the results to come back. We ask that you make a Mychart page and view your test results this way. You are encouraged to Self quarantine until you know your results. If positive, please work on contact tracing. Increase fluids and rest  Saline nasal spray as needed for nasal congestion  Warm salt water gargles as needed for throat discomfort  Monitor temperature twice a day  Tylenol as needed for fevers and/or discomfort. Big deep breaths periodically throughout the day  Regular Mucinex over the counter as needed for chest congestion  If symptoms worsen -Go to the ER. Follow up with your primary care provider      To Whom it May Concern:    Alex Man was tested for COVID-19 7/7/2023. He/she must stay home until test results are back. If test is negative, ok to return to work/school. If test is positive, isolate for a total of 5 days, starting from day 1 of symptom onset. After 5 days, if fever-free for 24 hours and there has been a gradual improvement in symptoms, may come out of isolation, but must consistently wear a mask when around other people for 5 additional days. (5 days isolation, 5 days mask-wearing). We do not recommend retesting as patients may continue to test positive for months even though no longer contagious. It is suggested you call [facility] or [address] [place] [address] with any questions regarding isolation timeframe/return to work/school details.

## 2023-07-08 LAB
SARS-COV-2 RNA RESP QL NAA+PROBE: NOT DETECTED
SOURCE: NORMAL

## 2023-07-10 ENCOUNTER — HOSPITAL ENCOUNTER (OUTPATIENT)
Dept: PSYCHIATRY | Age: 43
Setting detail: THERAPIES SERIES
Discharge: HOME | End: 2023-07-10
Payer: MEDICAID

## 2023-07-10 PROCEDURE — H2020 THER BEHAV SVC, PER DIEM: HCPCS

## 2023-07-10 NOTE — GROUP NOTE
[facility] Group Therapy Note      7/10/2023    Location:  [facility] Presents: [phone]    Clients Absent: [phone]    Length of session: 3.0 hours    Group Note: [facility]    Group Type: Co-Ed    New members were welcomed and introduced. Norms and expectations of group were discussed. Content: GROUP CHECKED-IN  TOPIC:  \"PAW: Symptoms and Survival\"  Clients learned about the symptoms of Post-Acute Withdrawal and strategies to manage them that will not lead to a relapse. [address]  5/10/6628 96:55 PM    Co-Therapist: N/A      [facility] Group Progress Note    Samir Mars  1980  7/10/2023    Notes on Client Progress in Group    Rajiv Zhu participated in the group discussion. He was attentive and offered appropriate feedback. He stated, \"I'm familiar with PAW symptoms and now I have a better understanding of the intermittent stage of them. \"    [address]  7/25/9200 84:75 PM    Co-Therapist: N/A

## 2023-07-12 ENCOUNTER — HOSPITAL ENCOUNTER (OUTPATIENT)
Dept: PSYCHIATRY | Age: 43
Setting detail: THERAPIES SERIES
Discharge: HOME | End: 2023-07-12
Payer: MEDICAID

## 2023-07-12 PROCEDURE — H2020 THER BEHAV SVC, PER DIEM: HCPCS

## 2023-07-12 NOTE — GROUP NOTE
500 [facility] Group Therapy Note      7/12/2023    Location:  [place]      Clients Presents: [phone]    Clients Absent: 0    Length of session: 3.0 hours    Group Note: IOP    Group Type: Co-Ed    New members were welcomed and introduced. Norms and expectations of group were discussed. Content: GROUP CHECKED-IN   TOPIC:  \"Addressing Adult Children of Alcoholics (FLAQUITA's) Traits In Recovery\"  Clients explored how Adult Children of Alcoholics/Addicts who grew up in families where one or both parents chronically abuse alcohol and/or other drugs, suffered from other mental illness, or had other problems that made them unable to meet their children's needs. When those children become adults, they may have feelings and unhealthy behavior patterns that resemble those of their parents which are transferred into their own relationships. Laurie Hagan  1/65/4778 47:55 PM    Co-Therapist: N/A      [facility] Individual Group Progress Note    Darcie Zapata  1980 7/12/2023    Notes on Client Progress in Group    Isidoro Kingston participated in the group discussion. He completed treatment worksheet and gave appropriate feedback. He stated, \"I grew up with my father who was an alcoholic and started using harder drugs at 16years-old. \"    Laurie Hagan  8/16/0647 5:98 PM    Co-Therapist: N/A

## 2023-07-14 ENCOUNTER — HOSPITAL ENCOUNTER (OUTPATIENT)
Dept: PSYCHIATRY | Age: 43
Setting detail: THERAPIES SERIES
Discharge: HOME | End: 2023-07-14
Payer: MEDICAID

## 2023-07-14 PROCEDURE — H2020 THER BEHAV SVC, PER DIEM: HCPCS

## 2023-07-14 NOTE — GROUP NOTE
500 [facility] Group Therapy Note      7/14/2023    Location:  [place]      Clients Presents: [phone]    Clients Absent: 1467    Length of session: 3.0 hours    Group Note: [facility]    Group Type: Co-Ed    New members were welcomed and introduced. Norms and expectations of group were discussed. Content: GROUP CHECKED-IN  TOPIC:  \"Thinking Errors/Cognitive Distortions\"  Clients examined \"Thinking Errors/Cognitive Distortions\" worksheet. This worksheet described nine common thinking errors. Which included mind reading, feelings as facts, blowing things up, and more. Clients learned how \"Thinking Errors/Cognitive Distortions\" are irrational beliefs that contribute to uncomfortable emotions and unwanted behaviors that can potentially lead to relapse. [address] [facility]  4/66/7294 18:31 PM    Co-Therapist: N/A      [facility] Progress Note    George Altman  1980 7/14/2023    Notes on Client Progress in Group    Reyes Mora participated in the group discussion. He stated, \"I have a very hard time with my thinking even though I'm on mental health medications! \" Clients [address] [facility] provider talked with me while Reyes Mora was in her office with concerns about Moises's living situation and that he may need residential treatment. We discussed other options for the client for continued support of his sobriety and was given an advocates number from [employer] for assistance. Client plans to attend [facility] on Monday and REACH staff will continue to assist him to getting into residential services outside of [facility].     [address] [facility]  0/38/5718 0:00 PM    Co-Therapist: N/A

## 2023-07-17 ENCOUNTER — HOSPITAL ENCOUNTER (OUTPATIENT)
Dept: PSYCHIATRY | Age: 43
Setting detail: THERAPIES SERIES
Discharge: HOME | End: 2023-07-17
Payer: MEDICAID

## 2023-07-17 NOTE — GROUP NOTE
[facility] Group Therapy Note      7/17/2023    Location:  [place]      Clients Presents: [phone]    Clients Absent: [phone]    Length of session: 1.5 hours    Group Note: [facility]    Group Type: Co-Ed    New members were welcomed and introduced. Norms and expectations of group were discussed. Content: GROUP CHECKED-IN  TOPIC:  \"Progression of Alcoholism\"  Clients shared about their own experience of how alcoholism progressed in their own lives. Clients identified some of the barriers of staying sober and strategies they have used in maintaining their own sobriety. [address] [facility]  7/72/1886 06:93 AM    Co-Therapist: N/A      [facility] Progress Note    Kathy Rivera  1980 7/17/2023    Notes on Client Progress in Group    Reason for Absence: CX-Client called and reported having a mental health medication check scheduled for this morning and would probably an hour late for group.     Heather Roger [facility]  0/75/1382 61:09 AM    Co-Therapist: N/A

## 2023-07-19 ENCOUNTER — HOSPITAL ENCOUNTER (OUTPATIENT)
Dept: PSYCHIATRY | Age: 43
Setting detail: THERAPIES SERIES
Discharge: HOME | End: 2023-07-19
Payer: MEDICAID

## 2023-07-19 PROCEDURE — H2020 THER BEHAV SVC, PER DIEM: HCPCS

## 2023-07-19 NOTE — GROUP NOTE
500 [facility] Group Therapy Note      7/19/2023    Location:  [facility] Presents: [phone]    Clients Absent: 1257    Length of session: 3.0 hours    Group Note: [facility]    Group Type: Co-Ed    New members were welcomed and introduced. Norms and expectations of group were discussed. Content: GROUP CHECKED-IN  TOPIC:  \"Preventing Relapse Karina Cheatham" - \"Stages of Recovery\"  Clients complete a \"Preventing Relapse Quiz\" to test their knowledge about relapse. Clients reviewed the 4 stages of recovery to gain a working knowledge of the stages: 1-Abstinence (0-6 months), 2-Lifestyle Change (6-18 months), 3-Self-Knowledge (greater than 18 months), 4-Spirituality (Lifetime) and identified which stage they were in. [address] [facility]  5/68/8307 94:49 PM    Co-Therapist: N/A      [facility] Progress Note    Leelee Chun  1980 7/19/2023    Notes on Client Progress in Group    Samantha Gonzalez participated in the group discussion. He shared about making a decision not to go into residential (30-Day program) due to the stress that he is dealing with living with his girlfriend who he states, \"Nags me about my cheating, which I've done in the past, but I'm not doing know which makes me want to use! \" \"I'm going to stick it out until it gets worse and she throws me out! \" \"I will be seeing my mental health psych doctor on Monday's for the next few weeks. \"    [address] [facility]  1/10/1687 7:67 PM    Co-Therapist: N/A

## 2023-07-21 ENCOUNTER — HOSPITAL ENCOUNTER (OUTPATIENT)
Dept: PSYCHIATRY | Age: 43
Setting detail: THERAPIES SERIES
Discharge: HOME | End: 2023-07-21
Payer: MEDICAID

## 2023-07-21 PROCEDURE — H2020 THER BEHAV SVC, PER DIEM: HCPCS

## 2023-07-21 NOTE — GROUP NOTE
[facility] Group Therapy Note      7/21/2023    Location:  [place]      Clients Presents: [phone]    Clients Absent: [phone]    Length of session: 3.0 hours    Group Note: [facility]    Group Type: Co-Ed    New members were welcomed and introduced. Norms and expectations of group were discussed. Content: GROUP CHECKED-IN  TOPIC: \"Relapse Prevention\"  Clients identified strategies that they have been using to not relapse. Laurie Sandhu  3/86/5253 80:22 AM    Co-Therapist: N/A      [facility] Group Progress Note    Kathy Rivera  1980 7/21/2023    Notes on Client Progress in Group    Kristyn Johnson participated in the group discussion. He was attentive and stated, \"My greatest fear is relapsing! \" \"So, I've been talking to my sponsor, going to support meetings, and taking my [address] [facility] medications to not relapse! \"    Laurie Sheikh  5/94/6674 39:92 PM    Co-Therapist: N/A

## 2023-07-24 ENCOUNTER — HOSPITAL ENCOUNTER (OUTPATIENT)
Dept: PSYCHIATRY | Age: 43
Setting detail: THERAPIES SERIES
End: 2023-07-24
Payer: MEDICAID

## 2023-07-24 ENCOUNTER — HOSPITAL ENCOUNTER (OUTPATIENT)
Dept: PSYCHIATRY | Age: 43
Setting detail: THERAPIES SERIES
Discharge: HOME | End: 2023-07-24
Payer: MEDICAID

## 2023-07-24 PROCEDURE — H2020 THER BEHAV SVC, PER DIEM: HCPCS

## 2023-07-24 NOTE — GROUP NOTE
500 [facility] Group Therapy Note      7/24/2023    Location:  [place]      Clients Presents: 4852, [address] [place] [address] [address], 2700, [phone] [phone]    Clients Absent:     Length of session: 3.0 hours    Group Note: IOP    Group Type: Co-Ed    New members were welcomed and introduced. Norms and expectations of group were discussed. Content: Counselor facilitated solution focused group on relapse prevention. Client identified coping skills to avoid relapse. [facility]  7/24/2023 10:36 AM    Co-Therapist: N/A      [facility] Progress Note    Catrachito Castillo  1980 7/24/2023    Notes on Client Progress in Group    Client shared he is making progress on his goals in treatment and voiced being worried that this is usually the time when he will relapse. He has over 100 days clean. Client shared his biggest stressor is finding motivation to get stuff done. He talks to his sober support daily.      [facility]  7/24/2023 12:13 PM    Co-Therapist: N/A

## 2023-07-25 ENCOUNTER — OFFICE VISIT (OUTPATIENT)
Dept: FAMILY MEDICINE CLINIC | Age: 43
End: 2023-07-25
Payer: MEDICAID

## 2023-07-25 VITALS
HEIGHT: 70 IN | WEIGHT: 224 LBS | DIASTOLIC BLOOD PRESSURE: 82 MMHG | BODY MASS INDEX: 32.07 KG/M2 | HEART RATE: 120 BPM | TEMPERATURE: 97.1 F | OXYGEN SATURATION: 96 % | SYSTOLIC BLOOD PRESSURE: 130 MMHG

## 2023-07-25 DIAGNOSIS — R00.0 TACHYCARDIA: ICD-10-CM

## 2023-07-25 DIAGNOSIS — G43.109 MIGRAINE WITH AURA AND WITHOUT STATUS MIGRAINOSUS, NOT INTRACTABLE: ICD-10-CM

## 2023-07-25 DIAGNOSIS — R59.9 REACTIVE LYMPHADENOPATHY: Primary | ICD-10-CM

## 2023-07-25 DIAGNOSIS — I10 PRIMARY HYPERTENSION: Chronic | ICD-10-CM

## 2023-07-25 DIAGNOSIS — R73.03 PREDIABETES: ICD-10-CM

## 2023-07-25 PROCEDURE — 36415 COLL VENOUS BLD VENIPUNCTURE: CPT | Performed by: FAMILY MEDICINE

## 2023-07-25 PROCEDURE — 99214 OFFICE O/P EST MOD 30 MIN: CPT | Performed by: FAMILY MEDICINE

## 2023-07-25 PROCEDURE — 3075F SYST BP GE 130 - 139MM HG: CPT | Performed by: FAMILY MEDICINE

## 2023-07-25 PROCEDURE — 3079F DIAST BP 80-89 MM HG: CPT | Performed by: FAMILY MEDICINE

## 2023-07-25 RX ORDER — CEPHALEXIN 500 MG/1
500 CAPSULE ORAL 4 TIMES DAILY
Qty: 28 CAPSULE | Refills: 0 | Status: SHIPPED | OUTPATIENT
Start: 2023-07-25 | End: 2023-08-01

## 2023-07-25 RX ORDER — ATOMOXETINE 25 MG/1
25 CAPSULE ORAL 2 TIMES DAILY
COMMUNITY
Start: 2023-06-23

## 2023-07-25 RX ORDER — PALIPERIDONE 3 MG/1
3 TABLET, EXTENDED RELEASE ORAL EVERY MORNING
COMMUNITY
Start: 2023-07-17

## 2023-07-25 RX ORDER — METOPROLOL SUCCINATE 50 MG/1
50 TABLET, EXTENDED RELEASE ORAL DAILY
Qty: 90 TABLET | Refills: 1 | Status: SHIPPED | OUTPATIENT
Start: 2023-07-25

## 2023-07-25 RX ORDER — RIZATRIPTAN BENZOATE 10 MG/1
10 TABLET ORAL 2 TIMES DAILY PRN
Qty: 12 TABLET | Refills: 5 | Status: SHIPPED | OUTPATIENT
Start: 2023-07-25

## 2023-07-25 ASSESSMENT — ENCOUNTER SYMPTOMS
SHORTNESS OF BREATH: 0
DIARRHEA: 0
COUGH: 0
VOMITING: 0
NAUSEA: 0
RHINORRHEA: 0

## 2023-07-25 NOTE — ASSESSMENT & PLAN NOTE
We will check an A1c today to monitor for progression of prediabetes to diabetes. I will also check a urine albumin creatinine to check for kidney damage from the hypertension and prediabetes.

## 2023-07-25 NOTE — ASSESSMENT & PLAN NOTE
Since his heart rate is faster than ideal today I am going to increase the metoprolol to 50 mg daily. This should help with controlling both the tachycardia and the hypertension.

## 2023-07-26 ENCOUNTER — HOSPITAL ENCOUNTER (OUTPATIENT)
Dept: PSYCHIATRY | Age: 43
Setting detail: THERAPIES SERIES
Discharge: HOME | End: 2023-07-26
Payer: MEDICAID

## 2023-07-26 LAB
CREAT UR-MCNC: 108.7 MG/DL (ref 39–259)
EST. AVERAGE GLUCOSE BLD GHB EST-MCNC: 137 MG/DL
HBA1C MFR BLD: 6.4 %
MICROALBUMIN UR DL<=1MG/L-MCNC: <1.2 MG/DL
MICROALBUMIN/CREAT UR: NORMAL MG/G (ref 0–30)

## 2023-07-26 PROCEDURE — H2020 THER BEHAV SVC, PER DIEM: HCPCS

## 2023-07-26 NOTE — GROUP NOTE
500 [facility] Group Therapy Note      7/26/2023    Location:  [facility] Presents: [phone]    Clients Absent: 0    Length of session: 3.0 hours    Group Note: IOP    Group Type: Co-Ed    New members were welcomed and introduced. Norms and expectations of group were discussed. Content: GROUP CHECKED-IN  TOPIC:  \"Think Your Way Out Of Using\"  Clients discussed treatment worksheet where they identified strategies to \"Think Their Way Out of Using\": Think about the benefits of not using, List and recall unpleasant drinking/drugging situations, Focus on the progress you are making, Think about the potential negative consequences of using, Distractions, and Challenging your Irrational Beliefs. [address]  3/47/9378 2:85 PM    Co-Therapist: N/A      [facility] Progress Note    Georges Bird  1980 7/26/2023    Notes on Client Progress in Group    Marques Lyle participated in the group discussion. He was attentive and stated, \"I'm aware of my irrational thoughts but I'm not sure how to stop them! \" He received appropriate feedback from group members and stated, \"It's hard for me  to think positive when my past is always being thrown in my face! \"    [address]  9/47/3734 3:77 PM    Co-Therapist: N/A

## 2023-07-28 ENCOUNTER — HOSPITAL ENCOUNTER (OUTPATIENT)
Dept: PSYCHIATRY | Age: 43
Setting detail: THERAPIES SERIES
Discharge: HOME | End: 2023-07-28
Payer: MEDICAID

## 2023-07-28 PROCEDURE — 90853 GROUP PSYCHOTHERAPY: CPT

## 2023-07-31 ENCOUNTER — APPOINTMENT (OUTPATIENT)
Dept: PSYCHIATRY | Age: 43
End: 2023-07-31
Payer: MEDICAID

## 2023-07-31 PROCEDURE — H2020 THER BEHAV SVC, PER DIEM: HCPCS

## 2023-07-31 NOTE — GROUP NOTE
500 [facility] Group Therapy Note      7/31/2023    Location:  [facility] Presents: [phone]    Clients Absent: 1467    Length of session: 3.0 hours    Group Note: [facility]    Group Type: Co-Ed    New members were welcomed and introduced. Norms and expectations of group were discussed. Content: GROUP CHECKED-IN  TOPIC:  \"Anxiety Triggers and Warning Signs\"  Clients learned about anxiety, the importance of normalizing healthy levels of anxiety and to treat it. Clients first identified how they experience anxiety physically, behaviorally, cognitively, and emotionally. Secondly, clients learned how to develop a hierarchy of least to most anxiety-producing experiences. Finally, clients learned how to develop a plan for coping with anxiety. [address]  4/99/9754 1:96 PM    Co-Therapist: N/A      [facility] Progress Note    Nadya Rasmussen  1980 7/31/2023    Notes on Client Progress in Group    Cornelious Kussmaul participated in the group discussion. He was attentive, completed treatment worksheet and stated, \"My anxiety has been controlled by my mental health medications which has really worked for me! \"    [address]  9/96/6251 9:15 PM    Co-Therapist: N/A

## 2023-08-02 ENCOUNTER — HOSPITAL ENCOUNTER (OUTPATIENT)
Dept: PSYCHIATRY | Age: 43
Setting detail: THERAPIES SERIES
Discharge: HOME | End: 2023-08-02
Payer: COMMERCIAL

## 2023-08-02 NOTE — GROUP NOTE
500 [facility] Group Therapy Note      8/2/2023    Location:  [place]      Clients Presents: [phone]    Clients Absent: 7177    Length of session: 3.0 hours    Group Note: IOP    Group Type: Co-Ed    New members were welcomed and introduced. Norms and expectations of group were discussed. Content: GROUP CHECKED-IN  TOPIC:  \"Coping Skills-Depression\"  Clients learned that depression could be a huge trigger to relapse if not recognized and managed well. Clients learned various strategies to combating depression: (Think of positive experiences, Practice Mindfulness, Exercise, Socialize/Sober Support, Personal Care, Watch a funny movie/Utube/Tik-[place]. Clients were encouraged to start small, make a plan, and take a friend for support.     Laurie Boyer  4/5/6210 4:58 PM    Co-Therapist: N/A      [facility] Progress Note    Georges Bird  1980 8/2/2023    Notes on Client Progress in Group    Reason for Absence: DNS - Letter of Intent was sent    Laurie Boyer  7/5/4624 0:36 PM    Co-Therapist: N/A

## 2023-08-04 ENCOUNTER — HOSPITAL ENCOUNTER (OUTPATIENT)
Dept: PSYCHIATRY | Age: 43
Setting detail: THERAPIES SERIES
Discharge: HOME | End: 2023-08-04
Payer: COMMERCIAL

## 2023-08-04 PROCEDURE — H2020 THER BEHAV SVC, PER DIEM: HCPCS

## 2023-08-07 ENCOUNTER — HOSPITAL ENCOUNTER (OUTPATIENT)
Dept: PSYCHIATRY | Age: 43
Setting detail: THERAPIES SERIES
Discharge: HOME | End: 2023-08-07
Payer: COMMERCIAL

## 2023-08-07 NOTE — GROUP NOTE
500 [facility] Group Therapy Note      8/7/2023    Location:  [facility] Presents: [phone]    Clients Absent: 7177    Length of session: 3.0 hours    Group Note: IOP    Group Type: Co-Ed    New members were welcomed and introduced. Norms and expectations of group were discussed. Content: GROUP CHECKED-IN  TOPIC: \"Coping with Cravings\" - \"Self Assessment\"  Clients identified the following coping strategies for cravings (Walking/Exercise, Hobbies, Support Groups, Developing a daily plan and an overall routine, Calling a friend, Doing Yoga/Meditation, Listening to Music). Clients completed a \"Self Assessment\" where they identified common triggers, strategies to handle them that promote abstinence and shared a situation, how they handled it, and what they could have done differently. [address]  9/5/5511 88:87 PM    Co-Therapist: N/A      [facility] Group Progress Note    Rajiv Griffith  1980 8/7/2023    Notes on Client Progress in Group    Reason for Absence: CX-Client reported having an appointment with his [facility].      [address]  4/8/1505 3:46 PM    Co-Therapist: N/A

## 2023-08-09 ENCOUNTER — HOSPITAL ENCOUNTER (OUTPATIENT)
Dept: PSYCHIATRY | Age: 43
Setting detail: THERAPIES SERIES
Discharge: HOME | End: 2023-08-09
Payer: COMMERCIAL

## 2023-08-09 PROCEDURE — 80305 DRUG TEST PRSMV DIR OPT OBS: CPT

## 2023-08-09 PROCEDURE — H2020 THER BEHAV SVC, PER DIEM: HCPCS

## 2023-08-09 NOTE — GROUP NOTE
500 [facility] Group Therapy Note      8/9/2023    Location:  [place]      Clients Presents: [phone]    Clients Absent: [phone]    Length of session: 3.0 hours    Group Note: IOP    Group Type: Co-Ed    New members were welcomed and introduced. Norms and expectations of group were discussed. Content: GROUP CHECKED-IN  TOPIC:   Cognitive-Behavioral Therapy - \"Safe Coping Skills\"  Clients read and discussed hand-out where they identified 80 \"Safe Seeking Coping Skills. \" Clients learned about various coping strategies that can assist them in maintaining long-sobriety. Laurie Sheppard  1/1/8356 49:99 PM    Co-Therapist: N/A      [facility] Group Progress Note    Randy Marte  1980 8/9/2023    Notes on Client Progress in Group    Anamika Emery participated in the group discussion. He was attentive, read portions of the hand-out, continues with his mental health provider, and stated that he will be taking his first vacation with his girlfriend and her parents clean/sober (8/10-8/19). He will move down to level I and begin Thursday morning group on 8/24/23. He will be waiting for an individual session with this provider when one opens up.     Laurie Sheppard  1/1/7140 8:77 PM    Co-Therapist: N/A

## 2023-08-10 ENCOUNTER — HOSPITAL ENCOUNTER (OUTPATIENT)
Dept: PSYCHIATRY | Age: 43
Discharge: HOME | End: 2023-08-10

## 2023-08-10 NOTE — GROUP NOTE
500 [facility] Group Therapy Note      8/10/2023    Location:  [place]      Clients Presents: [address], [facility] Andry, 9554, 1481, 1257, 1385,     Clients Absent: 0313, 1373, 1491    Length of session: 1.5 hours    Group Note: OP    Group Type: Co-Ed    New members were welcomed and introduced. Norms and expectations of group were discussed. Content: Counselor presented a solution focused discussion on grief and loss in recovery. Client identified feelings of grief and coping skills to avoid relapse.      [facility]  8/10/2023 11:41 AM    Co-Therapist: N/A      [facility] Group Progress Note    Merrill Rasmussen  1980  8/10/2023    Notes on Client Progress in Group    Reason for Absence: vacation      Cyrtony Phoenix, [facility]  8/10/2023 11:51 AM    Co-Therapist: N/A

## 2023-08-11 ENCOUNTER — APPOINTMENT (OUTPATIENT)
Dept: PSYCHIATRY | Age: 43
End: 2023-08-11
Payer: COMMERCIAL

## 2023-08-14 ENCOUNTER — APPOINTMENT (OUTPATIENT)
Dept: PSYCHIATRY | Age: 43
End: 2023-08-14
Payer: COMMERCIAL

## 2023-08-16 ENCOUNTER — APPOINTMENT (OUTPATIENT)
Dept: PSYCHIATRY | Age: 43
End: 2023-08-16
Payer: COMMERCIAL

## 2023-08-17 ENCOUNTER — HOSPITAL ENCOUNTER (OUTPATIENT)
Dept: PSYCHIATRY | Age: 43
Discharge: HOME | End: 2023-08-17

## 2023-08-17 NOTE — GROUP NOTE
500 [facility] Group Therapy Note      8/17/2023    Location:  [place]      Clients Presents: [phone], [phone] [phone]    Clients Absent: 9554, 1257, 1385, 1373, 7177    Length of session: 1.5 hours    Group Note: OP    Group Type: Co-Ed    New members were welcomed and introduced. Norms and expectations of group were discussed. Content: Counselor facilitated a topic focused discussion on codependency. Client identified patterns and characteristics of codependency. Client identified solutions for ways to avoid practicing co-dependent behavior.      Beny Ross[address]  8/17/2023 11:34 AM    Co-Therapist: N/A      [facility] Progress Note    Hossein Ayala  1980 8/17/2023    Notes on Client Progress in Group    Reason for Absence: canBeny Toribio[address]  8/17/2023 11:54 AM    Co-Therapist: N/A

## 2023-08-18 ENCOUNTER — APPOINTMENT (OUTPATIENT)
Dept: PSYCHIATRY | Age: 43
End: 2023-08-18
Payer: COMMERCIAL

## 2023-08-21 ENCOUNTER — APPOINTMENT (OUTPATIENT)
Dept: PSYCHIATRY | Age: 43
End: 2023-08-21
Payer: COMMERCIAL

## 2023-08-21 ENCOUNTER — HOSPITAL ENCOUNTER (OUTPATIENT)
Dept: PSYCHIATRY | Age: 43
Setting detail: THERAPIES SERIES
Discharge: HOME | End: 2023-08-21
Payer: COMMERCIAL

## 2023-08-21 PROCEDURE — H2020 THER BEHAV SVC, PER DIEM: HCPCS

## 2023-08-21 NOTE — PROGRESS NOTES
500 [facility] TREATMENT PLAN      Location: [x] [place] [] [facility]    Treatment plan:   Initial  7177    Strengths:      Weakness/Limitations:      Service/Frequency/Duration: Individual 1-2x's Month in 90 Days, IOP Mon/Wed/Fri from 9-12 in 90 Days, Urinalysis Random monthly in 90 Days, AA/NA 2-4 Weekly in 90 Days, Sponsor Contact Weekly in 90 Days and Case Management as Needed in 90 Days    Diagnosis:   F10.20 Other and unspecified alcohol dependence/unspecified drinking behavior and F11.20 Opioid dependence-unspecified use,    Level of Care: 2.1 Intensive Outpatient Services    Problem: On Probation/SARIKA/Menacing Charge/History of Substance  Goal: Abstain from using drugs/alcohol in 90 Days   Objectives:   1) Identify 3 Indicators of Addiction in 90 Days. Evaluation Date: 10/25/23  Code: C Continue TBD   2)  Identify 3 Negative Effects of Substance Use in 90 Days. Evaluation Date:  10/25/23  Code: C Continue TBD     iii  3)  Identify 3 Positives for maintaining sobriety in 90 day Evaluation Date:  10/25/23  Code: C continue TBD    2. Problem: Lacks Coping Skills to Maintain Long-term Sobriety   Goal: Acquire necessary skills to maintain sobriety in 90 Days   Objectives:   1)  Identify 3 Changes that you will make that support your recovery in 90 Days Evaluation Date:  10/25/23  Code: C Continue TBD   2) Identify 3 External/Internal Triggers and Sober Responses to them in 90 Days Evaluation Date: 10/25/23  Code: C Continue TBD   3) 1x Month contact with REACH  for support in 90 Days Evaluation Date: 10/25/23  Code: C Continue TBD     3.     Problem: Lacks Understanding and Knowledge of Addiction   Goal: Develop increased awareness of the Disease of Addiction and Relapse triggers and coping strategies needed to effectively deal with them that support long-term sobriety in 90 Days  Objectives:   1)  Identify 3 Ways to Establish a Sustained Recovery Lifestyle in 90 Days Evaluation Date: 10/25/23

## 2023-08-21 NOTE — GROUP NOTE
500 [facility] Group Therapy Note      8/21/2023    Location:  [place]      Clients Presents: [phone]    Clients Absent: 0    Length of session: 3.0 hours    Group Note: IOP    Group Type: Co-Ed    New members were welcomed and introduced. Norms and expectations of group were discussed. Content: GROUP CHECKED-IN  TOPIC:  Manual Dears Area Between Using and Sobriety\" - \"The Differences Between Abstinence and Recovery\"  Clients discussed the gray area between using and sobriety. Clients learned that you can't rush a successful sobriety, you must nurture it, and help figure out your own path to maintaining abstinence. Clients discussed the differences between abstinence and recovery. Clients learned that abstinence is the removal of something negative from your life. While recovery is the removal of something negative and the consequential replacing of that negativity with a positive lifestyle change instead. Laurie Gonzalez  1/79/2996 2:44 PM    Co-Therapist: N/A      [facility] Individual Group Progress Note    Catrachito Castillo  1980 8/21/2023    Notes on Client Progress in Group    This was Moises's last IOP session. He participated in the group discussion. He was attentive and shared good insight in explaining the difference between abstaining and recovery.  He will begin Level I Outpatient treatment this week attending group on Thursday mornings at 10:00 am and individual sessions on Wednesday's at 3:00 pm.    Laurie Gonzalez  2/30/9955 8:34 PM    Co-Therapist: N/A

## 2023-08-23 ENCOUNTER — HOSPITAL ENCOUNTER (OUTPATIENT)
Dept: PSYCHIATRY | Age: 43
Setting detail: THERAPIES SERIES
Discharge: HOME | End: 2023-08-23
Payer: COMMERCIAL

## 2023-08-23 ENCOUNTER — APPOINTMENT (OUTPATIENT)
Dept: PSYCHIATRY | Age: 43
End: 2023-08-23
Payer: COMMERCIAL

## 2023-08-23 PROCEDURE — 90834 PSYTX W PT 45 MINUTES: CPT

## 2023-08-23 NOTE — PROGRESS NOTES
[facility]                Progress Note    [x] [place]  [] [place]                    Patient Name: Hossein Ayala   : 1980     Case # :  [id number]  Therapist: Laurie Douglas Trego        Objective/Service/Time:    1-HOUR    S:  Client completed individual session. He reports no use of any substances. He reviewed UDS results from 23 which was negative. He stated, \"We had a great time in [place]-It was one of the best trips I've ever had with my family clean and sober! \" I commended him on his continued sobriety and how he has come a long way. He stated, \"I want to continue in treatment here because it has really been a good support for me to get out of the house along with [facility]! \"     O:  Client was cooperative and fully oriented. He was dressed well and had a hat on which had brought from his [place] trip. A:  Client completed his Initial Treatment Plan because one was not devised from his initial assessment. He will continue working toward completing his Individualized Treatment Plan goals and objectives. P:  Client is committed to attending group and individual therapy sessions weekly.                       Leeann Sanchez MA, [facility], , 9:45 PM

## 2023-08-24 ENCOUNTER — HOSPITAL ENCOUNTER (OUTPATIENT)
Dept: PSYCHIATRY | Age: 43
Setting detail: THERAPIES SERIES
Discharge: HOME | End: 2023-08-24
Payer: COMMERCIAL

## 2023-08-24 PROCEDURE — 90853 GROUP PSYCHOTHERAPY: CPT

## 2023-08-24 NOTE — GROUP NOTE
500 [facility] Group Therapy Note      8/24/2023    Location:  [place]      Clients Presents: 3959, [address], [phone], 2021, 9789    Clients Absent: 4428    Length of session: 1.5 hours    Group Note: OP    Group Type: Co-Ed    New members were welcomed and introduced. Norms and expectations of group were discussed. Content: Counselor facilitated client check in information focusing on life's stressors and coping skills to avoid relapse. HOLDEN BlackwoodIII  8/24/2023 11:30 AM    Co-Therapist: N/A      [facility] Group Progress Note    Nereida Kingston  1980 8/24/2023    Notes on Client Progress in Group    Client shared he is making progress on his goals in treatment. He denies any use or cravings. Client shared when he is stressed he prays. He attends his [employer] recovery meetings and talks to his sponsor.      Sj Romero [facility]  8/24/2023 11:44 AM    Co-Therapist: N/A

## 2023-08-24 NOTE — PROGRESS NOTES
500 [facility] [address] TREATMENT PLAN      Location: [x] [place] [] [facility]    Treatment plan:   Initial  2873   (8/23/23)    Strengths:  Kind, Helpful    Weakness/Limitations:  Chronic Relapsing    Service/Frequency/Duration: Individual 1-2x's Month in 90 Days, IOP Mon/Wed/Fri from 9-12 in 90 Days, Urinalysis Random monthly in 90 Days, AA/NA 2-4 Weekly in 90 Days, Sponsor Contact Weekly in 90 Days and Case Management as Needed in 90 Days    Diagnosis:   F10.20 Other and unspecified alcohol dependence/unspecified drinking behavior and F11.20 Opioid dependence-unspecified use, 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 Evaluation Date: 10/13/23  Code: A Achieved 5/17/23  Loss of Control, Need and Compulsion, Continued Use Despite Adverse Consequences (See Group Note)    2)  Verbalize 3 Negative Effects of Substance Use in 90 Days. Evaluation Date:  10/13/23  Code: A  Achieved   5/19/23  Worsen Physical Health, Relationship Strain, Loss of Mental Health Medications, Loss of employment (See Group Note)     iii  3)  Verbalize 3 Positives for maintaining sobriety in 90 day Evaluation Date:  10/13/23  Code: A  Achieved 6/21/23 Keeping my girlfriend happy, Maintaining employment, Attending [facility], Getting out of the house (See Group Note)    2.     Problem: Lacks Coping Skills to Maintain Long-term Sobriety   Goal: Acquire necessary skills to maintain sobriety in 90 Days   Objectives:   1)  Identify 3 Components of Anxiety in 90 Days Evaluation Date:  10/13/23  Code: A Achieved 7/31/23 Physical Sensations, Thoughts, Behavioral Responses (See Group Note)   2) Identify 3 External/Internal

## 2023-08-25 ENCOUNTER — APPOINTMENT (OUTPATIENT)
Dept: PSYCHIATRY | Age: 43
End: 2023-08-25
Payer: COMMERCIAL

## 2023-08-28 ENCOUNTER — APPOINTMENT (OUTPATIENT)
Dept: PSYCHIATRY | Age: 43
End: 2023-08-28
Payer: COMMERCIAL

## 2023-08-30 ENCOUNTER — OFFICE VISIT (OUTPATIENT)
Dept: FAMILY MEDICINE CLINIC | Age: 43
End: 2023-08-30
Payer: COMMERCIAL

## 2023-08-30 ENCOUNTER — APPOINTMENT (OUTPATIENT)
Dept: PSYCHIATRY | Age: 43
End: 2023-08-30
Payer: COMMERCIAL

## 2023-08-30 VITALS
OXYGEN SATURATION: 96 % | WEIGHT: 229.6 LBS | BODY MASS INDEX: 32.94 KG/M2 | TEMPERATURE: 98.2 F | DIASTOLIC BLOOD PRESSURE: 82 MMHG | SYSTOLIC BLOOD PRESSURE: 124 MMHG | HEART RATE: 106 BPM

## 2023-08-30 DIAGNOSIS — R74.01 ELEVATED ALT MEASUREMENT: ICD-10-CM

## 2023-08-30 DIAGNOSIS — I10 PRIMARY HYPERTENSION: Chronic | ICD-10-CM

## 2023-08-30 DIAGNOSIS — G25.81 RLS (RESTLESS LEGS SYNDROME): ICD-10-CM

## 2023-08-30 DIAGNOSIS — J30.1 SEASONAL ALLERGIC RHINITIS DUE TO POLLEN: ICD-10-CM

## 2023-08-30 DIAGNOSIS — E78.2 MIXED HYPERLIPIDEMIA: ICD-10-CM

## 2023-08-30 DIAGNOSIS — R14.0 ABDOMINAL DISTENSION: Primary | ICD-10-CM

## 2023-08-30 DIAGNOSIS — M77.12 LATERAL EPICONDYLITIS OF LEFT ELBOW: ICD-10-CM

## 2023-08-30 DIAGNOSIS — R53.83 FATIGUE, UNSPECIFIED TYPE: ICD-10-CM

## 2023-08-30 DIAGNOSIS — R35.0 URINARY FREQUENCY: ICD-10-CM

## 2023-08-30 DIAGNOSIS — K21.9 GASTROESOPHAGEAL REFLUX DISEASE WITHOUT ESOPHAGITIS: ICD-10-CM

## 2023-08-30 DIAGNOSIS — E66.09 CLASS 1 OBESITY DUE TO EXCESS CALORIES WITH SERIOUS COMORBIDITY AND BODY MASS INDEX (BMI) OF 32.0 TO 32.9 IN ADULT: ICD-10-CM

## 2023-08-30 DIAGNOSIS — R73.03 PREDIABETES: ICD-10-CM

## 2023-08-30 DIAGNOSIS — E88.81 METABOLIC SYNDROME: ICD-10-CM

## 2023-08-30 PROBLEM — F43.12 CHRONIC POST-TRAUMATIC STRESS DISORDER: Status: ACTIVE | Noted: 2023-04-12

## 2023-08-30 PROCEDURE — G8427 DOCREV CUR MEDS BY ELIG CLIN: HCPCS | Performed by: FAMILY MEDICINE

## 2023-08-30 PROCEDURE — 3074F SYST BP LT 130 MM HG: CPT | Performed by: FAMILY MEDICINE

## 2023-08-30 PROCEDURE — 3079F DIAST BP 80-89 MM HG: CPT | Performed by: FAMILY MEDICINE

## 2023-08-30 PROCEDURE — 99214 OFFICE O/P EST MOD 30 MIN: CPT | Performed by: FAMILY MEDICINE

## 2023-08-30 PROCEDURE — G8417 CALC BMI ABV UP PARAM F/U: HCPCS | Performed by: FAMILY MEDICINE

## 2023-08-30 PROCEDURE — 36415 COLL VENOUS BLD VENIPUNCTURE: CPT | Performed by: FAMILY MEDICINE

## 2023-08-30 PROCEDURE — 81003 URINALYSIS AUTO W/O SCOPE: CPT | Performed by: FAMILY MEDICINE

## 2023-08-30 PROCEDURE — 1036F TOBACCO NON-USER: CPT | Performed by: FAMILY MEDICINE

## 2023-08-30 RX ORDER — IBUPROFEN 800 MG/1
800 TABLET ORAL EVERY 8 HOURS PRN
Qty: 90 TABLET | Refills: 0 | Status: SHIPPED | OUTPATIENT
Start: 2023-08-30

## 2023-08-30 RX ORDER — CETIRIZINE HYDROCHLORIDE 10 MG/1
10 TABLET ORAL DAILY
Qty: 90 TABLET | Refills: 3 | Status: SHIPPED | OUTPATIENT
Start: 2023-08-30

## 2023-08-30 RX ORDER — OMEPRAZOLE 20 MG/1
20 CAPSULE, DELAYED RELEASE ORAL DAILY
Qty: 90 CAPSULE | Refills: 2 | Status: SHIPPED | OUTPATIENT
Start: 2023-08-30

## 2023-08-30 RX ORDER — METFORMIN HYDROCHLORIDE 500 MG/1
500 TABLET, EXTENDED RELEASE ORAL
Qty: 90 TABLET | Refills: 3 | Status: SHIPPED | OUTPATIENT
Start: 2023-08-30

## 2023-08-30 RX ORDER — ROPINIROLE 1 MG/1
1 TABLET, FILM COATED ORAL NIGHTLY
Qty: 90 TABLET | Refills: 1 | Status: SHIPPED | OUTPATIENT
Start: 2023-08-30

## 2023-08-30 RX ORDER — PROPRANOLOL HYDROCHLORIDE 10 MG/1
TABLET ORAL
COMMUNITY
Start: 2023-08-08

## 2023-08-30 RX ORDER — HYDROXYZINE HYDROCHLORIDE 25 MG/1
25 TABLET, FILM COATED ORAL 2 TIMES DAILY PRN
COMMUNITY
Start: 2023-08-21

## 2023-08-30 SDOH — ECONOMIC STABILITY: INCOME INSECURITY: HOW HARD IS IT FOR YOU TO PAY FOR THE VERY BASICS LIKE FOOD, HOUSING, MEDICAL CARE, AND HEATING?: NOT HARD AT ALL

## 2023-08-30 SDOH — ECONOMIC STABILITY: FOOD INSECURITY: WITHIN THE PAST 12 MONTHS, YOU WORRIED THAT YOUR FOOD WOULD RUN OUT BEFORE YOU GOT MONEY TO BUY MORE.: NEVER TRUE

## 2023-08-30 SDOH — ECONOMIC STABILITY: FOOD INSECURITY: WITHIN THE PAST 12 MONTHS, THE FOOD YOU BOUGHT JUST DIDN'T LAST AND YOU DIDN'T HAVE MONEY TO GET MORE.: NEVER TRUE

## 2023-08-30 SDOH — ECONOMIC STABILITY: HOUSING INSECURITY
IN THE LAST 12 MONTHS, WAS THERE A TIME WHEN YOU DID NOT HAVE A STEADY PLACE TO SLEEP OR SLEPT IN A SHELTER (INCLUDING NOW)?: NO

## 2023-08-30 ASSESSMENT — ENCOUNTER SYMPTOMS
ANAL BLEEDING: 0
SHORTNESS OF BREATH: 1
VOMITING: 0
DIARRHEA: 0
ABDOMINAL DISTENTION: 1
COUGH: 0
ABDOMINAL PAIN: 1
BLOOD IN STOOL: 0

## 2023-08-30 NOTE — ASSESSMENT & PLAN NOTE
His abdominal distention is a new undiagnosed problem. I will evaluate with blood tests including hepatic function panel, BMP, CMP, TSH since he has associated fatigue. I will also order a CT scan of the abdomen and pelvis.

## 2023-08-31 ENCOUNTER — HOSPITAL ENCOUNTER (OUTPATIENT)
Dept: PSYCHIATRY | Age: 43
Setting detail: THERAPIES SERIES
Discharge: HOME | End: 2023-08-31
Payer: COMMERCIAL

## 2023-08-31 LAB
ALBUMIN SERPL-MCNC: 4.7 G/DL (ref 3.4–5)
ALP SERPL-CCNC: 90 U/L (ref 40–129)
ALT SERPL-CCNC: 47 U/L (ref 10–40)
ANION GAP SERPL CALCULATED.3IONS-SCNC: 13 MMOL/L (ref 3–16)
AST SERPL-CCNC: 26 U/L (ref 15–37)
BASOPHILS # BLD: 0 K/UL (ref 0–0.2)
BASOPHILS NFR BLD: 0.4 %
BILIRUB DIRECT SERPL-MCNC: <0.2 MG/DL (ref 0–0.3)
BILIRUB INDIRECT SERPL-MCNC: ABNORMAL MG/DL (ref 0–1)
BILIRUB SERPL-MCNC: <0.2 MG/DL (ref 0–1)
BILIRUB UR QL STRIP.AUTO: NEGATIVE
BUN SERPL-MCNC: 10 MG/DL (ref 7–20)
CALCIUM SERPL-MCNC: 9.6 MG/DL (ref 8.3–10.6)
CHLORIDE SERPL-SCNC: 101 MMOL/L (ref 99–110)
CHOLEST SERPL-MCNC: 234 MG/DL (ref 0–199)
CLARITY UR: CLEAR
CO2 SERPL-SCNC: 22 MMOL/L (ref 21–32)
COLOR UR: YELLOW
CREAT SERPL-MCNC: 1.1 MG/DL (ref 0.9–1.3)
DEPRECATED RDW RBC AUTO: 12.8 % (ref 12.4–15.4)
EOSINOPHIL # BLD: 0.1 K/UL (ref 0–0.6)
EOSINOPHIL NFR BLD: 1.7 %
GFR SERPLBLD CREATININE-BSD FMLA CKD-EPI: >60 ML/MIN/{1.73_M2}
GLUCOSE SERPL-MCNC: 149 MG/DL (ref 70–99)
GLUCOSE UR STRIP.AUTO-MCNC: NEGATIVE MG/DL
HBV SURFACE AG SERPL QL IA: NORMAL
HCT VFR BLD AUTO: 40.1 % (ref 40.5–52.5)
HDLC SERPL-MCNC: 37 MG/DL (ref 40–60)
HGB BLD-MCNC: 14.2 G/DL (ref 13.5–17.5)
HGB UR QL STRIP.AUTO: NEGATIVE
KETONES UR STRIP.AUTO-MCNC: NEGATIVE MG/DL
LDLC SERPL CALC-MCNC: ABNORMAL MG/DL
LDLC SERPL-MCNC: 140 MG/DL
LEUKOCYTE ESTERASE UR QL STRIP.AUTO: NEGATIVE
LYMPHOCYTES # BLD: 1.6 K/UL (ref 1–5.1)
LYMPHOCYTES NFR BLD: 26.5 %
MCH RBC QN AUTO: 31.3 PG (ref 26–34)
MCHC RBC AUTO-ENTMCNC: 35.3 G/DL (ref 31–36)
MCV RBC AUTO: 88.6 FL (ref 80–100)
MONOCYTES # BLD: 0.3 K/UL (ref 0–1.3)
MONOCYTES NFR BLD: 5.7 %
NEUTROPHILS # BLD: 4 K/UL (ref 1.7–7.7)
NEUTROPHILS NFR BLD: 65.7 %
NITRITE UR QL STRIP.AUTO: NEGATIVE
PH UR STRIP.AUTO: 6.5 [PH] (ref 5–8)
PLATELET # BLD AUTO: 252 K/UL (ref 135–450)
PMV BLD AUTO: 8.2 FL (ref 5–10.5)
POTASSIUM SERPL-SCNC: 4.2 MMOL/L (ref 3.5–5.1)
PROT SERPL-MCNC: 6.9 G/DL (ref 6.4–8.2)
PROT UR STRIP.AUTO-MCNC: NEGATIVE MG/DL
RBC # BLD AUTO: 4.53 M/UL (ref 4.2–5.9)
SODIUM SERPL-SCNC: 136 MMOL/L (ref 136–145)
SP GR UR STRIP.AUTO: 1.02 (ref 1–1.03)
TRIGL SERPL-MCNC: 368 MG/DL (ref 0–150)
TSH SERPL DL<=0.005 MIU/L-ACNC: 1.21 UIU/ML (ref 0.27–4.2)
UA COMPLETE W REFLEX CULTURE PNL UR: NORMAL
UA DIPSTICK W REFLEX MICRO PNL UR: NORMAL
URN SPEC COLLECT METH UR: NORMAL
UROBILINOGEN UR STRIP-ACNC: 0.2 E.U./DL
VLDLC SERPL CALC-MCNC: ABNORMAL MG/DL
WBC # BLD AUTO: 6 K/UL (ref 4–11)

## 2023-08-31 PROCEDURE — 90853 GROUP PSYCHOTHERAPY: CPT
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
## 2023-09-01 ENCOUNTER — APPOINTMENT (OUTPATIENT)
Dept: PSYCHIATRY | Age: 43
End: 2023-09-01
Payer: COMMERCIAL

## 2023-09-04 ENCOUNTER — APPOINTMENT (OUTPATIENT)
Dept: PSYCHIATRY | Age: 43
End: 2023-09-04
Payer: COMMERCIAL

## 2023-09-06 ENCOUNTER — APPOINTMENT (OUTPATIENT)
Dept: PSYCHIATRY | Age: 43
End: 2023-09-06
Payer: COMMERCIAL

## 2023-09-06 ENCOUNTER — HOSPITAL ENCOUNTER (OUTPATIENT)
Dept: PSYCHIATRY | Age: 43
Setting detail: THERAPIES SERIES
Discharge: HOME | End: 2023-09-06
Payer: COMMERCIAL

## 2023-09-06 PROCEDURE — 90834 PSYTX W PT 45 MINUTES: CPT

## 2023-09-06 NOTE — PROGRESS NOTES
12[facility]                Progress Note    [x] Shabnam  [] Franci                    Patient Name: Valentine Shaw   : 1980     Case # :  [id number]  Therapist: Laurie Duncan Brooke        Objective/Service/Time:    1-HOUR    S:  Client completed individual session. He reports no use of any substances. He stated, [facility] doctor told me that I need to lose some weight because I am pre-diabetic. \" \"I have a scan scheduled for Saturday to look at my stomach-I might have a tumor because my stomach keeps getting bigger. \" Client voiced more concerns about his weight and came up with some ideas that may help him loose weight (walk more, watch my portions, watch when I eat). O:  Client was cooperative and fully oriented       A:  Client will continue working toward completing his Individualized Treatment Plan goals and objectives. P:  Client is committed to attending group and individual therapy sessions weekly.                   Jeremiah Pena MA, [facility], , 1:61 PM
Days Evaluation Date: 10/13/23  Code: A  Achieved 8/24/28  Sponsor Contact, [facility], Reading the Bible (See Group Note)   3) Identify 3 Ways Anger had been Expressed while group up in 90 Days Evaluation Date: 10/13/23  Code: A Achieved 8/31/23 Physically, Verbally, Emotionally (See Group Note)    3. Problem: Lacks Understanding and Knowledge of Addiction   Goal: Develop increased awareness of the Disease of Addiction and Relapse triggers and coping strategies needed to effectively deal with them that support long-term sobriety in 90 Days  Objectives:   1)  Identify 3 Ways the [employer] can help with obtaining a Sustained Recovery Lifestyle in 90 Days Evaluation Date: 10/13/23    Code: A  Achieved  6/17/23 Seeing what I can change, What I can't change and to know the difference (See Group Note)  2)  Identify 3 Things you have control over and 3 things you don't have control over in 90 Days Evaluation Date:   10/13/23  Code: A Achieved 6/14/23  Relapse/life situations, Raising my kids properly/Kids getting hurt outside, Supporting my family/Controlling my girlfriend (See Group Note)  3) Identify 5 Stages of Change in Addiction Recovery in 90 Days Evaluation Date:  10/13/23  Code: C Continue TBD     Defer:  Smoking Cessation    Discharge Plan/Instructions: Complete treatment plan goals and objectives, comply with mental health provider recommendations and maintain sobriety. Sol Bob / 1980 has participated in the treatment plan development outlined above on 9/6/2023.      [address], [address] Wei Perez  0/3/2905/7:73 PM

## 2023-09-07 ENCOUNTER — HOSPITAL ENCOUNTER (OUTPATIENT)
Dept: PSYCHIATRY | Age: 43
Setting detail: THERAPIES SERIES
Discharge: HOME | End: 2023-09-07
Payer: COMMERCIAL

## 2023-09-07 PROCEDURE — 90853 GROUP PSYCHOTHERAPY: CPT

## 2023-09-07 NOTE — GROUP NOTE
500 [facility] Group Therapy Note      9/7/2023    Location:  [place]      Clients Presents: [address], [address] [facility], 68 58 82, 1385,    Clients Absent: 1491, 1497    Length of session: 1.5 hours    Group Note: OP    Group Type: Co-Ed    New members were welcomed and introduced. Norms and expectations of group were discussed. Content: Counselor presented a topic focused discussion on addiction. Client identified what addiction meant to him/her. Client identified sober support persons he/she talks to weekly. Sameer Mares [facility]  9/7/2023 11:32 AM    Co-Therapist: N/A      [facility] Individual Group Progress Note    Eden Diaz  1980 9/7/2023    Notes on Client Progress in Group    Client reports he is making progress on his goals in treatment. He is staying sober. Client shared addiction is being physically dependent on a substance and lying to everyone around you. Client shared he talks to his [facility] people.     Sameer Mares [facility]  9/7/2023 11:45 AM    Co-Therapist: N/A

## 2023-09-08 ENCOUNTER — APPOINTMENT (OUTPATIENT)
Dept: PSYCHIATRY | Age: 43
End: 2023-09-08
Payer: COMMERCIAL

## 2023-09-09 ENCOUNTER — HOSPITAL ENCOUNTER (OUTPATIENT)
Dept: CT IMAGING | Age: 43
Discharge: HOME | End: 2023-09-09
Payer: COMMERCIAL

## 2023-09-09 DIAGNOSIS — R14.0 ABDOMINAL DISTENSION: ICD-10-CM

## 2023-09-09 PROCEDURE — 74177 CT ABD & PELVIS W/CONTRAST: CPT

## 2023-09-09 PROCEDURE — 6360000004 HC RX CONTRAST MEDICATION: Performed by: FAMILY MEDICINE

## 2023-09-09 PROCEDURE — 2580000003 HC RX 258: Performed by: FAMILY MEDICINE

## 2023-09-09 PROCEDURE — 2500000003 HC RX 250 WO HCPCS: Performed by: FAMILY MEDICINE

## 2023-09-09 RX ORDER — SODIUM CHLORIDE 0.9 % (FLUSH) 0.9 %
20 SYRINGE (ML) INJECTION
Status: COMPLETED | OUTPATIENT
Start: 2023-09-09 | End: 2023-09-09

## 2023-09-09 RX ADMIN — IOPAMIDOL 75 ML: 755 INJECTION, SOLUTION INTRAVENOUS at 17:47

## 2023-09-09 RX ADMIN — BARIUM SULFATE 900 ML: 20 SUSPENSION ORAL at 16:29

## 2023-09-09 RX ADMIN — SODIUM CHLORIDE, PRESERVATIVE FREE 20 ML: 5 INJECTION INTRAVENOUS at 17:47

## 2023-09-11 ENCOUNTER — TELEMEDICINE (OUTPATIENT)
Dept: FAMILY MEDICINE CLINIC | Age: 43
End: 2023-09-11
Payer: COMMERCIAL

## 2023-09-11 ENCOUNTER — APPOINTMENT (OUTPATIENT)
Dept: PSYCHIATRY | Age: 43
End: 2023-09-11
Payer: COMMERCIAL

## 2023-09-11 DIAGNOSIS — Z11.59 SCREENING FOR VIRAL DISEASE: ICD-10-CM

## 2023-09-11 DIAGNOSIS — R73.03 PREDIABETES: ICD-10-CM

## 2023-09-11 DIAGNOSIS — E88.81 METABOLIC SYNDROME: ICD-10-CM

## 2023-09-11 DIAGNOSIS — R53.83 FATIGUE, UNSPECIFIED TYPE: Primary | ICD-10-CM

## 2023-09-11 PROCEDURE — G8417 CALC BMI ABV UP PARAM F/U: HCPCS | Performed by: FAMILY MEDICINE

## 2023-09-11 PROCEDURE — G8428 CUR MEDS NOT DOCUMENT: HCPCS | Performed by: FAMILY MEDICINE

## 2023-09-11 PROCEDURE — 99213 OFFICE O/P EST LOW 20 MIN: CPT | Performed by: FAMILY MEDICINE

## 2023-09-11 PROCEDURE — 1036F TOBACCO NON-USER: CPT | Performed by: FAMILY MEDICINE

## 2023-09-11 RX ORDER — METFORMIN HYDROCHLORIDE 500 MG/1
1000 TABLET, EXTENDED RELEASE ORAL
Qty: 180 TABLET | Refills: 3 | Status: SHIPPED | OUTPATIENT
Start: 2023-09-11

## 2023-09-11 ASSESSMENT — ENCOUNTER SYMPTOMS
COUGH: 1
SHORTNESS OF BREATH: 1
DIARRHEA: 0
ANAL BLEEDING: 0
BLOOD IN STOOL: 0
VOMITING: 0

## 2023-09-11 NOTE — ASSESSMENT & PLAN NOTE
He has excessive fatigue despite normal labs. He does snore. He reports that he does some lashing out in his sleep possibly due to PTSD. I will refer for a sleep study. Of note is that he does have hypertension and he does have obesity. He was not in the office today for me to do a neck measurement or check his oropharynx.

## 2023-09-11 NOTE — ASSESSMENT & PLAN NOTE
He reports polyuria and polydipsia. Most recent A1c in July 2023 was 6.4. I will increase the metformin to 1000 mg a day (2 pills).

## 2023-09-13 ENCOUNTER — APPOINTMENT (OUTPATIENT)
Dept: PSYCHIATRY | Age: 43
End: 2023-09-13
Payer: COMMERCIAL

## 2023-09-13 ENCOUNTER — HOSPITAL ENCOUNTER (OUTPATIENT)
Dept: PSYCHIATRY | Age: 43
Setting detail: THERAPIES SERIES
Discharge: HOME | End: 2023-09-13
Payer: COMMERCIAL

## 2023-09-13 PROCEDURE — 90834 PSYTX W PT 45 MINUTES: CPT

## 2023-09-13 PROCEDURE — 80305 DRUG TEST PRSMV DIR OPT OBS: CPT

## 2023-09-13 NOTE — PROGRESS NOTES
[facility]                Progress Note    [x] Shabnam  [] Nimo Louie                    Patient Name: Drew Tucker   : 1980     Case # :  [id number]  Therapist: Laurie Galindo [place]        Objective/Service/Time:    1-HOUR     S:  Client completed individual session. He reports no use of any substances. He stated, \"Our washer went out last week and this week it's our car! \" He reports getting more hours for work at least two on the weekends but he needs to find transportation due to buses not running on the weekends. O:  Client was cooperative and fully oriented       A:  Client will continue working toward completing his Individualized Treatment Plan goals and objectives. His last individual session will be on 10/4/23 and his last group will be on 10/5/23. P:  Client is committed to attending group and individual therapy sessions weekly.                       Manjit Grimm MA, [facility], , 5:38 PM
(0) indicator not present

## 2023-09-14 ENCOUNTER — HOSPITAL ENCOUNTER (OUTPATIENT)
Dept: PSYCHIATRY | Age: 43
Setting detail: THERAPIES SERIES
Discharge: HOME | End: 2023-09-14
Payer: COMMERCIAL

## 2023-09-14 PROCEDURE — 90853 GROUP PSYCHOTHERAPY: CPT

## 2023-09-14 NOTE — GROUP NOTE
500 [facility] Group Therapy Note      9/14/2023    Location:  [place]      Clients Presents: 6077, 2354, 1385, 1468, 7177    Clients Absent: 6093, 1491, 3106, 1498    Length of session: 1.5 hours    Group Note: OP    Group Type: Co-Ed    New members were welcomed and introduced. Norms and expectations of group were discussed. Content: Counselor facilitated a group discussion on \"asking for help\". Client identified the importance of asking for help and crushing the ego. Client identified people he/she has for support. Client offered peers feedback and support. Muriel Hazel [place] Liza  9/14/2023 11:30 AM    Co-Therapist: N/A      [facility] Individual Group Progress Note    Brian Le  1980 9/14/2023    Notes on Client Progress in Group    Client shared he is making progress on his goals in treatment. He is remaining sober and attending [facility]. Client shared his stressors this week are 2 broken cars, a broken washer and financial trouble. Client participated in group discussion on asking for help. Client shared he needs lots of help but doesn't know who to ask.      Muriel Hazel W [employer] Liza  9/14/2023 11:43 AM    Co-Therapist: N/A

## 2023-09-15 ENCOUNTER — APPOINTMENT (OUTPATIENT)
Dept: PSYCHIATRY | Age: 43
End: 2023-09-15
Payer: COMMERCIAL

## 2023-09-18 ENCOUNTER — APPOINTMENT (OUTPATIENT)
Dept: PSYCHIATRY | Age: 43
End: 2023-09-18
Payer: COMMERCIAL

## 2023-09-20 ENCOUNTER — APPOINTMENT (OUTPATIENT)
Dept: PSYCHIATRY | Age: 43
End: 2023-09-20
Payer: COMMERCIAL

## 2023-09-20 ENCOUNTER — HOSPITAL ENCOUNTER (OUTPATIENT)
Dept: PSYCHIATRY | Age: 43
Setting detail: THERAPIES SERIES
Discharge: HOME | End: 2023-09-20
Payer: COMMERCIAL

## 2023-09-20 PROCEDURE — 90834 PSYTX W PT 45 MINUTES: CPT

## 2023-09-20 NOTE — PROGRESS NOTES
[facility]                Progress Note    [x] Shabnam  [] Priscilla Rosas                    Patient Name: Julio Hadley   : 1980     Case # :  [id number]  Therapist: Jewels Roger, Mariana Perez        Objective/Service/Time:      1-HOUR     S:  Client completed individual session. He reports no use of any substances. He stated, \"I worked this weekend  and took on two extra days! \" \"The car is working again but I our washer is still not working. \" Client was given a resource of [employer] that sell products for a substantially low price where he might find a washer. Client shared that he feels more stable in his sobriety today due to having cl and being apart of the [address] [place] [address]. O:  Client was cooperative and fully oriented       A:  Client reports having a solid support network, has a sponsor, and attends support meetings weekly. He will continue working toward completing his Individualized Treatment Plan goals and objectives. His last individual session will be on 10/4/23 and his last group will be on 10/5/23. P:  Client is committed to attending group and individual therapy sessions weekly.                 Dorys Ware MA, [facility], 74/84/97, 1:89 PM

## 2023-09-21 ENCOUNTER — HOSPITAL ENCOUNTER (OUTPATIENT)
Dept: PSYCHIATRY | Age: 43
Setting detail: THERAPIES SERIES
Discharge: HOME | End: 2023-09-21
Payer: COMMERCIAL

## 2023-09-21 PROCEDURE — 90853 GROUP PSYCHOTHERAPY: CPT

## 2023-09-21 NOTE — GROUP NOTE
500 [facility] Group Therapy Note      9/21/2023    Location:  [address] [employer]      Clients Presents: 4590, [address], [address] [facility], 2895, 2218, 2878, 4691, 5825    Clients Absent:     Length of session: 1.5 hours    Group Note: OP    Group Type: Co-Ed    New members were welcomed and introduced. Norms and expectations of group were discussed. Content: Counselor presented a topic focused discussion on the harmful effects of alcohol. Beny Pulido0 W Mini De Jesus  9/21/2023 11:30 AM    Co-Therapist: N/A      [facility] Individual Group Progress Note    Mick Bowens  1980 9/21/2023    Notes on Client Progress in Group    Client shared he is making progress in treatment. He is still engaged in support meetings and Protestant. Client reports his stressor is his broken washer. Client participated in group discussion of the harmful effects on alcohol reporting he learned new information.      Beny Pulido0 W [place] Liza  9/21/2023 11:49 AM    Co-Therapist: N/A

## 2023-09-22 ENCOUNTER — APPOINTMENT (OUTPATIENT)
Dept: PSYCHIATRY | Age: 43
End: 2023-09-22
Payer: COMMERCIAL

## 2023-09-23 ENCOUNTER — PATIENT MESSAGE (OUTPATIENT)
Dept: FAMILY MEDICINE CLINIC | Age: 43
End: 2023-09-23

## 2023-09-23 DIAGNOSIS — B35.6 TINEA CRURIS: Primary | ICD-10-CM

## 2023-09-25 RX ORDER — CLOTRIMAZOLE 1 %
CREAM (GRAM) TOPICAL
Qty: 1 EACH | Refills: 2 | Status: SHIPPED | OUTPATIENT
Start: 2023-09-25 | End: 2023-10-02

## 2023-09-25 NOTE — TELEPHONE ENCOUNTER
From: Maxine Amaya  To: Dr. Raquel Gonzáles: 9/23/2023 3:51 PM EDT  Subject: jock itch    I've had this rash off and on all summer it hurts really bad & usually appears while working.  I think it's jock itch

## 2023-09-26 ENCOUNTER — HOSPITAL ENCOUNTER (OUTPATIENT)
Age: 43
Discharge: HOME | End: 2023-09-26
Payer: COMMERCIAL

## 2023-09-26 DIAGNOSIS — Z11.59 SCREENING FOR VIRAL DISEASE: ICD-10-CM

## 2023-09-26 LAB — HBV SURFACE AG SERPL QL IA: NON REACTIVE

## 2023-09-26 PROCEDURE — 87389 HIV-1 AG W/HIV-1&-2 AB AG IA: CPT

## 2023-09-26 PROCEDURE — 87340 HEPATITIS B SURFACE AG IA: CPT

## 2023-09-26 PROCEDURE — 36415 COLL VENOUS BLD VENIPUNCTURE: CPT

## 2023-09-27 ENCOUNTER — HOSPITAL ENCOUNTER (OUTPATIENT)
Dept: PSYCHIATRY | Age: 43
Setting detail: THERAPIES SERIES
Discharge: HOME | End: 2023-09-27
Payer: COMMERCIAL

## 2023-09-27 LAB — HIV 1+2 AB+HIV1P24 AG SERPLBLD IA.RAPID: NON REACTIVE

## 2023-09-27 PROCEDURE — 90834 PSYTX W PT 45 MINUTES: CPT

## 2023-09-27 NOTE — PROGRESS NOTES
12[facility]                Progress Note    [x] Shabnam  [] Bonnie Cavazos                    Patient Name: Joyce More   : 1980     Case # :  [id number]  Therapist: Laurie Escalante        Objective/Service/Time:      1-HOUR     S:  Client completed individual session. He reports no use of any substances. He stated, \"I Client shared that I feel more stable in his sobriety today due to having cl, being apart of the [facility] and working the [employer]! \"      O:  Client was cooperative and fully oriented       A:  Client reports having a solid support network, has a sponsor, and attends support meetings weekly. He will continue working toward completing his Individualized Treatment Plan goals and objectives. His last individual session will be on 10/4/23 and his last group will be on 10/5/23. P:  Client is committed to attending group and individual therapy sessions weekly.                 Dale Wakefield MA, Laurie Attala, [id number], 8:77 PM

## 2023-09-28 ENCOUNTER — HOSPITAL ENCOUNTER (OUTPATIENT)
Dept: PSYCHIATRY | Age: 43
Setting detail: THERAPIES SERIES
Discharge: HOME | End: 2023-09-28
Payer: COMMERCIAL

## 2023-09-28 PROCEDURE — 90853 GROUP PSYCHOTHERAPY: CPT

## 2023-09-28 NOTE — GROUP NOTE
500 [facility] Group Therapy Note      9/28/2023    Location:  [place]     Clients Presents: 1124, [address], 1513, 1491, 1159, 7177, 1468,     Clients Absent: 0036, 1971    Length of session: 1.5 hours    Group Note: OP    Group Type: Co-Ed    New members were welcomed and introduced. Norms and expectations of group were discussed. Content: Counselor facilitated a topic focused discussion on cl in recovery. Chirag Chester, Beny0 W [employer] Liza  9/28/2023 11:30 AM    Co-Therapist: N/A      [facility] Individual Group Progress Note    Brian Le  1980 9/28/2023    Notes on Client Progress in Group    Client reports he is making progress on his goals in treatment. He denies any current stressors but is looking for a new job. Client participated in group discussion on cl in recovery sharing he believes in [address] and prays. He stated, \"God is the foundation of my recovery\".      Beny Hazel0 W [place] Liza  9/28/2023 11:46 AM    Co-Therapist: N/PRIYANKA

## 2023-09-29 ENCOUNTER — HOSPITAL ENCOUNTER (OUTPATIENT)
Dept: SLEEP CENTER | Age: 43
Discharge: HOME | End: 2023-09-29
Payer: COMMERCIAL

## 2023-09-29 VITALS
OXYGEN SATURATION: 95 % | HEIGHT: 70 IN | HEART RATE: 72 BPM | DIASTOLIC BLOOD PRESSURE: 80 MMHG | SYSTOLIC BLOOD PRESSURE: 120 MMHG | WEIGHT: 210 LBS | BODY MASS INDEX: 30.06 KG/M2

## 2023-09-29 DIAGNOSIS — G47.10 HYPERSOMNOLENCE: Primary | ICD-10-CM

## 2023-09-29 DIAGNOSIS — R06.83 SNORING: ICD-10-CM

## 2023-09-29 PROCEDURE — 99211 OFF/OP EST MAY X REQ PHY/QHP: CPT

## 2023-09-29 ASSESSMENT — SLEEP AND FATIGUE QUESTIONNAIRES
HOW LIKELY ARE YOU TO NOD OFF OR FALL ASLEEP IN A CAR, WHILE STOPPED FOR A FEW MINUTES IN TRAFFIC: 0
ESS TOTAL SCORE: 13
HOW LIKELY ARE YOU TO NOD OFF OR FALL ASLEEP WHILE LYING DOWN TO REST IN THE AFTERNOON WHEN CIRCUMSTANCES PERMIT: 3
HOW LIKELY ARE YOU TO NOD OFF OR FALL ASLEEP WHILE SITTING INACTIVE IN A PUBLIC PLACE: 0
HOW LIKELY ARE YOU TO NOD OFF OR FALL ASLEEP WHILE SITTING QUIETLY AFTER LUNCH WITHOUT ALCOHOL: 1
HOW LIKELY ARE YOU TO NOD OFF OR FALL ASLEEP WHILE SITTING AND TALKING TO SOMEONE: 1
HOW LIKELY ARE YOU TO NOD OFF OR FALL ASLEEP WHEN YOU ARE A PASSENGER IN A CAR FOR AN HOUR WITHOUT A BREAK: 3
HOW LIKELY ARE YOU TO NOD OFF OR FALL ASLEEP WHILE WATCHING TV: 3
NECK CIRCUMFERENCE (INCHES): 17
HOW LIKELY ARE YOU TO NOD OFF OR FALL ASLEEP WHILE SITTING AND READING: 2

## 2023-09-29 NOTE — PROGRESS NOTES
Katerin FOLEY, Jose Miguel Mandujano MD, [address] [facility] MD, [address] [place] [address] DO       W. Kiley Hooks. [address] [facility], 1101    PH: [phone]  F: [phone]     Subjective:     Patient ID: Samir Mars is a 44 y.o. male, referred to the sleep center for consultation with a sleep specialist.     Reason for Consultation/Chief Complaint:   Chief Complaint   Patient presents with    Fatigue       Referring physician:  Dr.R. ASHLEY Andersen MD    Symptoms:   [x]  Snoring                                                               [x]  Dry Mouth  []  Choking                                                              []  Morning Headaches  []  Gasping for Air                                                   []  Trouble Falling asleep  [x]  Tired during the daytime                                    [x]  Trouble Staying Asleep  [x]  Tired when you wake up                                    [x]  Weight Gain in Last 5 Years  [x]  Wake up frequently at night                              []  Weight Loss in Last 5 Years  [x]  Shortness Of Breath                                          []  Shift Worker  [x]  Coughing                                                            [x]  Smoker (Previous or Current)  []  Chest Pain                                                          [x]  Anxiety  [x]  Trouble keeping your legs still at night              [x]  Depression  [x]  Kicking your legs in your sleep                         []  Insomnia            []  Other:     Significant Co-morbidities:  []  Congestive Heart Failure     []  COPD         []  Stroke (Past 30 Days)      []  Supplemental Oxygen Usage       []  Cognitive Impairment      []  Neuromuscular Problems  []  Epilepsy/Neurological Disorders         Duration of Sleep Problems:    History:    Social History     Socioeconomic

## 2023-10-04 ENCOUNTER — HOSPITAL ENCOUNTER (OUTPATIENT)
Dept: PSYCHIATRY | Age: 43
Setting detail: THERAPIES SERIES
End: 2023-10-04
Payer: COMMERCIAL

## 2023-10-04 ENCOUNTER — HOSPITAL ENCOUNTER (OUTPATIENT)
Dept: PSYCHIATRY | Age: 43
Setting detail: THERAPIES SERIES
Discharge: HOME | End: 2023-10-04
Payer: COMMERCIAL

## 2023-10-04 PROCEDURE — 90832 PSYTX W PT 30 MINUTES: CPT

## 2023-10-04 NOTE — PROGRESS NOTES
500 [facility] Discharge Treatment Plan    Tyesha [facility]  1980  Case # [id number]    Location: [x] [place] [] Reynaldo Hayes    A. Stresses that I need to monitor   1. Work   2. Kids   3. Finances   4. Life Events    B. Major triggers to using alcohol/drugs   1. Using Dreams   2. Drug Paraphernalia   3. Seeing Substances (Visual)     Sober Plan   1. [place]-Talk with Sponsor  2. Walk away   3. Call sponsor and walk away    C. Sobriety Support   1. FriendAlejandra Patino   2. Treatment staff: Guillermina   3. Family member: Shelia Tamez   4. AA/NA recovery program, sponsor, meetings day/times, daily ready: As Needed    D. Non-using activities  1. Work   2. [facility]/Coffee and fellowship  3. Play time with kids    E. Consequences of Drug/Alcohol use  1. Homelessness   2. Loss of family   3. Death    G. Short term goals to achieve  1. Work Full-time   2. Having some money in the bank    H. Follow up recommendations  1. Maintain Sobriety   2. Continue with sober    Martin Mars / 1980 has participated in the discharge treatment plan development outlined above on 10/4/2023.      [address], [facility]  24/9/0328/6:64 PM

## 2023-10-04 NOTE — PROGRESS NOTES
1296 [facility]                Progress Note    [x] [place]  [] [facility]                    Patient Name: Haroldine Soulier   : 1980     Case # :  [id number]  Therapist: Darryle Dolly Cotto, [facility]        Objective/Service/Time:    .27 MINUTES    S:  Client completed last individual session. He reported no use of substances. He stated, \"I'm ready to live in recovery! \" Client was very pleased with his progress this time around in recovery and believes that his cl has made the big difference in his recovery this time. His last group is this Thursday. O:  Client was cooperative and fully oriented. A:  Client has been cooperative in attending and participating in group and individual sessions. completed Satisfaction Survey, Outcome Questionnaire, and Discharge Treatment Plan. P:  Client was recommended to continue to abstain from all MAS and complete requirements for probation. A discharge will be completed.                   Felix Jang MA, [facility], , 7:41 PM

## 2023-10-05 ENCOUNTER — HOSPITAL ENCOUNTER (OUTPATIENT)
Dept: PSYCHIATRY | Age: 43
Setting detail: THERAPIES SERIES
Discharge: HOME | End: 2023-10-05
Payer: COMMERCIAL

## 2023-10-05 PROCEDURE — 90853 GROUP PSYCHOTHERAPY: CPT

## 2023-10-05 NOTE — PROGRESS NOTES
[address] 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 Evaluation Date: 10/13/23  Code: A Achieved 5/17/23  Loss of Control, Need and Compulsion, Continued Use Despite Adverse Consequences (See Group Note)    2)  Verbalize 3 Negative Effects of Substance Use in 90 Days. Evaluation Date:  10/13/23  Code: A  Achieved   5/19/23  Worsen Physical Health, Relationship Strain, Loss of Mental Health Medications, Loss of employment (See Group Note)     iii  3)  Verbalize 3 Positives for maintaining sobriety in 90 day Evaluation Date:  10/13/23  Code: A  Achieved 6/21/23 Keeping my girlfriend happy, Maintaining employment, Attending [facility], Getting out of the house (See Group Note)    2.     Problem: Lacks Coping Skills to Maintain Long-term Sobriety   Goal: Acquire necessary skills to maintain sobriety in 90 Days   Objectives:   1)  Identify 3 Components of Anxiety in 90 Days Evaluation Date:  10/13/23  Code: A Achieved 7/31/23 Physical Sensations, Thoughts, Behavioral Responses (See Group Note)   2) Identify 3 Coping Skills to Avoid Relapse in 90

## 2023-10-05 NOTE — GROUP NOTE
500 [facility] Group Therapy Note      10/5/2023    Location:  [place]      Clients Presents: [address], [address] [facility], 51 385 13 69, 1513, 1468, 9554, 7177    Clients Absent: 1497, 1491, 1159    Length of session: 1.5 hours    Group Note: OP    Group Type: Co-Ed    New members were welcomed and introduced. Norms and expectations of group were discussed. Content: Counselor presented a solution focused discussion on relapse prevention. Client identified triggers and coping skills to avoid relapse. Echo Green, Muriel W Mini De Jesus  10/5/2023 11:30 AM    Co-Therapist: N/A      [facility] Individual Group Progress Note    Georges Bird  1980  10/5/2023    Notes on Client Progress in Group    Client shared he has made good progress on his goals and is graduating today. He is still engaged in his CR support group and is now looking for a better job. Client participated in group discussion on relapse prevention sharing hanging out with old people was a big trigger for him. He changed his people and now uses his sober support and sponsor.       Echo Green, Muriel W Mini De Jesus  10/5/2023 11:48 AM    Co-Therapist: N/A

## 2023-10-09 ENCOUNTER — HOSPITAL ENCOUNTER (OUTPATIENT)
Dept: SLEEP CENTER | Age: 43
Discharge: HOME | End: 2023-10-09
Payer: COMMERCIAL

## 2023-10-09 DIAGNOSIS — R06.83 SNORING: ICD-10-CM

## 2023-10-09 DIAGNOSIS — G47.10 HYPERSOMNOLENCE: ICD-10-CM

## 2023-10-09 PROCEDURE — 95810 POLYSOM 6/> YRS 4/> PARAM: CPT

## 2023-10-10 ENCOUNTER — HOSPITAL ENCOUNTER (OUTPATIENT)
Dept: PSYCHIATRY | Age: 43
Discharge: HOME | End: 2023-10-10

## 2023-10-10 NOTE — PROGRESS NOTES
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, F11.20 Opioid dependence-unspecified use, F14.20 Cocaine dependence-unspecified use    PRESENTING PROBLEM:  Client was referred by his mental health provider due to chronic relapse on substances    Service: Individual 1x Month, IOP 3x Week, Urinalysis Random, AA/NA 1-2x Week, Sponsor As Needed, and Case Management As Needed    Level of care at admission:   2.1 Intensive Outpatient Services    Level of care at discharge:   1 Outpatient Services    Client's Outcomes Treatment: Client has been cooperative in attending and participating in group, individual sessions, and has completed all his goals on his individualized treatment plan. Service History Appointments: Vivian Nieto   Did not show-1    Discharge Code: B    Reason for discharge: Completed Treatment Program    Recommendations/Referrals: Maintain Sobriety and Continue with Sober Support    Upon involuntary termination from service, client has received Client Rights as to their right to file an appeal.     Adult And Adolescent Discharge Level of Care Criteria to be completed separately See Attachment. Laurie Mccollum   02/40/4322 / 2:55 PM       Medical Director     PHOENIX Whipple MD    Signature:

## 2023-10-11 ENCOUNTER — APPOINTMENT (OUTPATIENT)
Dept: PSYCHIATRY | Age: 43
End: 2023-10-11
Payer: COMMERCIAL

## 2023-10-12 ENCOUNTER — OFFICE VISIT (OUTPATIENT)
Dept: FAMILY MEDICINE CLINIC | Age: 43
End: 2023-10-12
Payer: COMMERCIAL

## 2023-10-12 ENCOUNTER — APPOINTMENT (OUTPATIENT)
Dept: PSYCHIATRY | Age: 43
End: 2023-10-12
Payer: COMMERCIAL

## 2023-10-12 VITALS
TEMPERATURE: 97.2 F | WEIGHT: 228.2 LBS | HEART RATE: 83 BPM | BODY MASS INDEX: 32.74 KG/M2 | SYSTOLIC BLOOD PRESSURE: 120 MMHG | DIASTOLIC BLOOD PRESSURE: 82 MMHG | OXYGEN SATURATION: 97 %

## 2023-10-12 DIAGNOSIS — R73.03 PREDIABETES: ICD-10-CM

## 2023-10-12 DIAGNOSIS — F90.2 ATTENTION DEFICIT HYPERACTIVITY DISORDER (ADHD), COMBINED TYPE: Chronic | ICD-10-CM

## 2023-10-12 DIAGNOSIS — R14.0 ABDOMINAL DISTENSION: Primary | ICD-10-CM

## 2023-10-12 DIAGNOSIS — I10 PRIMARY HYPERTENSION: ICD-10-CM

## 2023-10-12 DIAGNOSIS — G43.109 MIGRAINE WITH AURA AND WITHOUT STATUS MIGRAINOSUS, NOT INTRACTABLE: Chronic | ICD-10-CM

## 2023-10-12 PROCEDURE — G8427 DOCREV CUR MEDS BY ELIG CLIN: HCPCS | Performed by: FAMILY MEDICINE

## 2023-10-12 PROCEDURE — G8417 CALC BMI ABV UP PARAM F/U: HCPCS | Performed by: FAMILY MEDICINE

## 2023-10-12 PROCEDURE — 1036F TOBACCO NON-USER: CPT | Performed by: FAMILY MEDICINE

## 2023-10-12 PROCEDURE — 3079F DIAST BP 80-89 MM HG: CPT | Performed by: FAMILY MEDICINE

## 2023-10-12 PROCEDURE — 99214 OFFICE O/P EST MOD 30 MIN: CPT | Performed by: FAMILY MEDICINE

## 2023-10-12 PROCEDURE — 3074F SYST BP LT 130 MM HG: CPT | Performed by: FAMILY MEDICINE

## 2023-10-12 PROCEDURE — G8484 FLU IMMUNIZE NO ADMIN: HCPCS | Performed by: FAMILY MEDICINE

## 2023-10-12 RX ORDER — DICYCLOMINE HYDROCHLORIDE 10 MG/1
10 CAPSULE ORAL
Qty: 360 CAPSULE | Refills: 1 | Status: SHIPPED | OUTPATIENT
Start: 2023-10-12

## 2023-10-12 RX ORDER — IBUPROFEN 800 MG/1
800 TABLET ORAL EVERY 8 HOURS PRN
Qty: 90 TABLET | Refills: 0 | Status: SHIPPED | OUTPATIENT
Start: 2023-10-12

## 2023-10-12 RX ORDER — SUMATRIPTAN 100 MG/1
100 TABLET, FILM COATED ORAL DAILY PRN
Qty: 9 TABLET | Refills: 1 | Status: SHIPPED | OUTPATIENT
Start: 2023-10-12

## 2023-10-12 RX ORDER — LISINOPRIL 20 MG/1
TABLET ORAL
Qty: 90 TABLET | Refills: 2 | Status: SHIPPED | OUTPATIENT
Start: 2023-10-12

## 2023-10-12 NOTE — ASSESSMENT & PLAN NOTE
Continue ibuprofen occasionally for migraines. He has not had experience with Imitrex so I will prescribe Imitrex and he can see if it works well as an abortive agent for migraines.

## 2023-10-12 NOTE — PROGRESS NOTES
been.  I will put out a refill for dicyclomine that helps somewhat. Orders:  -     dicyclomine (BENTYL) 10 MG capsule; Take 1 capsule by mouth 4 times daily (before meals and nightly), Disp-360 capsule, R-1Normal  2. HTN, Primary hypertension  Assessment & Plan:   Blood pressures well controlled. Continue lisinopril 20 mg daily. Orders:  -     lisinopril (PRINIVIL;ZESTRIL) 20 MG tablet; TAKE 1 TABLET BY MOUTH ONCE DAILY, Disp-90 tablet, R-2Normal  3. Migraine with aura and without status migrainosus, not intractable  Assessment & Plan:   Continue ibuprofen occasionally for migraines. He has not had experience with Imitrex so I will prescribe Imitrex and he can see if it works well as an abortive agent for migraines. Orders:  -     ibuprofen (ADVIL;MOTRIN) 800 MG tablet; Take 1 tablet by mouth every 8 hours as needed for Pain, Disp-90 tablet, R-0Normal  -     SUMAtriptan (IMITREX) 100 MG tablet; Take 1 tablet by mouth daily as needed for Migraine, Disp-9 tablet, R-1Normal  4. Prediabetes  Assessment & Plan:   Encouraged eating more fruits and vegetables. Encouraged trying to find a form of exercise that he enjoys. 5. Attention deficit hyperactivity disorder (ADHD), combined type  Assessment & Plan:   ADHD is managed by another provider. Return in about 2 months (around 12/19/2023) for abd pain .    Beba Vides MD

## 2023-10-12 NOTE — ASSESSMENT & PLAN NOTE
His abdominal distention is not bothering him as much as it had been. I will put out a refill for dicyclomine that helps somewhat.

## 2023-10-12 NOTE — ASSESSMENT & PLAN NOTE
Encouraged eating more fruits and vegetables. Encouraged trying to find a form of exercise that he enjoys.

## 2023-10-18 ENCOUNTER — APPOINTMENT (OUTPATIENT)
Dept: PSYCHIATRY | Age: 43
End: 2023-10-18
Payer: COMMERCIAL

## 2023-10-19 ENCOUNTER — APPOINTMENT (OUTPATIENT)
Dept: PSYCHIATRY | Age: 43
End: 2023-10-19
Payer: COMMERCIAL

## 2023-10-20 ENCOUNTER — HOSPITAL ENCOUNTER (OUTPATIENT)
Dept: SLEEP CENTER | Age: 43
Discharge: HOME | End: 2023-10-20
Payer: COMMERCIAL

## 2023-10-20 DIAGNOSIS — G47.33 OSA (OBSTRUCTIVE SLEEP APNEA): Primary | ICD-10-CM

## 2023-10-20 PROCEDURE — 99211 OFF/OP EST MAY X REQ PHY/QHP: CPT

## 2023-10-20 NOTE — PROGRESS NOTES
forearm    ADHD (attention deficit hyperactivity disorder)     Anxiety 1/1/2010    Cat bite involving extremity 12/22/2011    Cellulitis of antecubital fossa 12/22/2011    Drug addiction ([address])     heroin    HLD, Mixed hyperlipidemia 01/06/2023    Lateral epicondylitis of left elbow 50/75/4483    Metabolic syndrome 43/12/5251    See lipid panel 3/2/22    Migraine with aura and without status migrainosus, not intractable 03/02/2022    Prediabetes 03/03/2022    Primary hypertension 03/02/2022    Restless legs syndrome 1/1/210    Severe episode of recurrent major depressive disorder, without psychotic features ([address]) 03/02/2022    Substance abuse ([address])        Past Surgical History:   Procedure Laterality Date    DENTAL SURGERY  4/1/13       Family History   Problem Relation Age of Onset    Cirrhosis Maternal Grandfather     Alcohol Abuse Maternal Grandfather          Objective: There were no vitals filed for this visit. Neck -    Inches  [place] -      []  Counseling for smoking cessation       Assessment:      Diagnosis:  Mild GARRY.                       EDS. Nocturnal desat. Plan: 1. Cpap titration. 2.RTO 1 mth.         Orders Placed This Encounter   Procedures    Sleep Study with PAP Titration          Electronically signed by Kirk Montgomery MD on 10/20/2023 at 4:05 PM

## 2023-10-25 ENCOUNTER — APPOINTMENT (OUTPATIENT)
Dept: PSYCHIATRY | Age: 43
End: 2023-10-25
Payer: COMMERCIAL

## 2023-10-26 ENCOUNTER — APPOINTMENT (OUTPATIENT)
Dept: PSYCHIATRY | Age: 43
End: 2023-10-26
Payer: COMMERCIAL

## 2023-10-27 ENCOUNTER — PATIENT MESSAGE (OUTPATIENT)
Dept: FAMILY MEDICINE CLINIC | Age: 43
End: 2023-10-27

## 2023-10-27 DIAGNOSIS — F90.2 ATTENTION DEFICIT HYPERACTIVITY DISORDER (ADHD), COMBINED TYPE: Primary | Chronic | ICD-10-CM

## 2023-10-27 RX ORDER — ATOMOXETINE 80 MG/1
80 CAPSULE ORAL DAILY
Qty: 30 CAPSULE | Refills: 1 | Status: SHIPPED | OUTPATIENT
Start: 2023-10-27

## 2023-10-27 NOTE — TELEPHONE ENCOUNTER
From: Fawn Sargent  To: Dr. Raffi Miner: 10/27/2023 2:43 PM EDT  Subject: Catrachito Mcelroy Dr I was wondering if I could get back on the Vyvanse I was taking 70mg once a day or If you could go ahead & raise my dose of Seterra like u said you would I only take 25mg of that & It isn't helping.  Thanks

## 2023-11-20 ENCOUNTER — OFFICE VISIT (OUTPATIENT)
Dept: FAMILY MEDICINE CLINIC | Age: 43
End: 2023-11-20
Payer: COMMERCIAL

## 2023-11-20 VITALS
HEART RATE: 78 BPM | SYSTOLIC BLOOD PRESSURE: 122 MMHG | BODY MASS INDEX: 33.52 KG/M2 | OXYGEN SATURATION: 98 % | TEMPERATURE: 97.2 F | DIASTOLIC BLOOD PRESSURE: 82 MMHG | WEIGHT: 233.6 LBS

## 2023-11-20 DIAGNOSIS — K42.9 UMBILICAL HERNIA WITHOUT OBSTRUCTION AND WITHOUT GANGRENE: Primary | ICD-10-CM

## 2023-11-20 DIAGNOSIS — K44.9 HIATAL HERNIA: ICD-10-CM

## 2023-11-20 PROCEDURE — G8427 DOCREV CUR MEDS BY ELIG CLIN: HCPCS | Performed by: FAMILY MEDICINE

## 2023-11-20 PROCEDURE — G8417 CALC BMI ABV UP PARAM F/U: HCPCS | Performed by: FAMILY MEDICINE

## 2023-11-20 PROCEDURE — 99213 OFFICE O/P EST LOW 20 MIN: CPT | Performed by: FAMILY MEDICINE

## 2023-11-20 PROCEDURE — G8484 FLU IMMUNIZE NO ADMIN: HCPCS | Performed by: FAMILY MEDICINE

## 2023-11-20 PROCEDURE — 3079F DIAST BP 80-89 MM HG: CPT | Performed by: FAMILY MEDICINE

## 2023-11-20 PROCEDURE — 3074F SYST BP LT 130 MM HG: CPT | Performed by: FAMILY MEDICINE

## 2023-11-20 PROCEDURE — 1036F TOBACCO NON-USER: CPT | Performed by: FAMILY MEDICINE

## 2023-11-20 ASSESSMENT — ENCOUNTER SYMPTOMS
DIARRHEA: 0
ABDOMINAL DISTENTION: 0
ABDOMINAL PAIN: 1
SHORTNESS OF BREATH: 1
CONSTIPATION: 0
VOMITING: 1
COUGH: 0
WHEEZING: 0
NAUSEA: 1

## 2023-11-20 NOTE — ASSESSMENT & PLAN NOTE
Explained that hiatal hernia is when the stomach pushes into the chest.  He can continue nonprescription antacids to treat the more internal pain that he may get from time to time from the hiatal hernia.

## 2023-11-20 NOTE — ASSESSMENT & PLAN NOTE
Will refer to gen surgeon and give note to limit lift to 10#. Since his pain is at the navel I will refer to a general surgeon for consideration of surgical treatment of the umbilical hernia.

## 2023-11-22 ENCOUNTER — OFFICE VISIT (OUTPATIENT)
Dept: SURGERY | Age: 43
End: 2023-11-22
Payer: COMMERCIAL

## 2023-11-22 VITALS
DIASTOLIC BLOOD PRESSURE: 88 MMHG | HEIGHT: 70 IN | SYSTOLIC BLOOD PRESSURE: 134 MMHG | BODY MASS INDEX: 33.44 KG/M2 | OXYGEN SATURATION: 96 % | WEIGHT: 233.6 LBS | HEART RATE: 94 BPM

## 2023-11-22 DIAGNOSIS — R06.02 SHORTNESS OF BREATH: ICD-10-CM

## 2023-11-22 DIAGNOSIS — R63.5 WEIGHT GAIN: ICD-10-CM

## 2023-11-22 DIAGNOSIS — K42.0 UMBILICAL HERNIA WITH OBSTRUCTION, WITHOUT GANGRENE: Primary | ICD-10-CM

## 2023-11-22 PROCEDURE — 3075F SYST BP GE 130 - 139MM HG: CPT | Performed by: SURGERY

## 2023-11-22 PROCEDURE — G8417 CALC BMI ABV UP PARAM F/U: HCPCS | Performed by: SURGERY

## 2023-11-22 PROCEDURE — 1036F TOBACCO NON-USER: CPT | Performed by: SURGERY

## 2023-11-22 PROCEDURE — G8427 DOCREV CUR MEDS BY ELIG CLIN: HCPCS | Performed by: SURGERY

## 2023-11-22 PROCEDURE — 99203 OFFICE O/P NEW LOW 30 MIN: CPT | Performed by: SURGERY

## 2023-11-22 PROCEDURE — G8484 FLU IMMUNIZE NO ADMIN: HCPCS | Performed by: SURGERY

## 2023-11-22 PROCEDURE — 3079F DIAST BP 80-89 MM HG: CPT | Performed by: SURGERY

## 2023-11-22 ASSESSMENT — ENCOUNTER SYMPTOMS
SHORTNESS OF BREATH: 1
VOMITING: 0
SORE THROAT: 0
TROUBLE SWALLOWING: 0
CHOKING: 0
COUGH: 0
STRIDOR: 0
COLOR CHANGE: 0
ABDOMINAL PAIN: 1
NAUSEA: 0
BACK PAIN: 0
ABDOMINAL DISTENTION: 1
BLOOD IN STOOL: 0

## 2023-11-22 NOTE — PROGRESS NOTES
SUBJECTIVE:  HPI:     Presents with umbilical hernia. Found recently on CT and subsequently has noticed it. No prior abdominal surgeries. CT showing tiny umbilical hernia with preperitoneal fat. He has some generalized abdominal pain as well as complaining of increased size of abdomen lately. He has gained about 10 pounds recently and he is not sure why. Denies significant changes to diet. He also reports increased fatigue, dry mouth and increased urinary frequency. No tobacco use. He does vape. His recent CT was otherwise unremarkable. His changes in bowel habits. Denies hematochezia, melena, change in caliber of stool.         Past Surgical History:   Procedure Laterality Date    DENTAL SURGERY  4/1/13     Past Medical History:   Diagnosis Date    Abscess 12/08/2012    left forearm    ADHD (attention deficit hyperactivity disorder)     Anxiety 1/1/2010    Cat bite involving extremity 12/22/2011    Cellulitis of antecubital fossa 12/22/2011    Drug addiction ([facility])     heroin    HLD, Mixed hyperlipidemia 01/06/2023    Lateral epicondylitis of left elbow 46/00/9167    Metabolic syndrome 14/54/8312    See lipid panel 3/2/22    Migraine with aura and without status migrainosus, not intractable 03/02/2022    Prediabetes 03/03/2022    Primary hypertension 03/02/2022    Restless legs syndrome 1/1/210    Severe episode of recurrent major depressive disorder, without psychotic features ([facility]) 03/02/2022    Substance abuse ([facility])      Family History   Problem Relation Age of Onset    Cirrhosis Maternal Grandfather     Alcohol Abuse Maternal Grandfather      Social History     Socioeconomic History    Marital status: Single     Spouse name: Not on file    Number of children: Not on file    Years of education: Not on file    Highest education level: Not on file   Occupational History    Not on file   Tobacco Use    Smoking status: Former     Packs/day: 1.00     Years: 10.00     Additional pack years: 0.00     Total

## 2023-12-15 ENCOUNTER — HOSPITAL ENCOUNTER (OUTPATIENT)
Dept: SLEEP CENTER | Age: 43
Discharge: HOME | End: 2023-12-15
Payer: COMMERCIAL

## 2023-12-15 DIAGNOSIS — G47.33 OSA (OBSTRUCTIVE SLEEP APNEA): ICD-10-CM

## 2023-12-15 PROCEDURE — 95811 POLYSOM 6/>YRS CPAP 4/> PARM: CPT

## 2023-12-16 ENCOUNTER — HOSPITAL ENCOUNTER (EMERGENCY)
Age: 43
Discharge: HOME | End: 2023-12-16

## 2023-12-16 VITALS
OXYGEN SATURATION: 94 % | SYSTOLIC BLOOD PRESSURE: 112 MMHG | RESPIRATION RATE: 18 BRPM | HEIGHT: 70 IN | DIASTOLIC BLOOD PRESSURE: 82 MMHG | HEART RATE: 98 BPM | BODY MASS INDEX: 33.5 KG/M2 | TEMPERATURE: 98.2 F | WEIGHT: 234 LBS

## 2023-12-16 DIAGNOSIS — B96.89 BACTERIAL CONJUNCTIVITIS OF BOTH EYES: Primary | ICD-10-CM

## 2023-12-16 DIAGNOSIS — H10.9 BACTERIAL CONJUNCTIVITIS OF BOTH EYES: Primary | ICD-10-CM

## 2023-12-16 RX ORDER — ERYTHROMYCIN 5 MG/G
OINTMENT OPHTHALMIC
Qty: 3.5 G | Refills: 0 | Status: SHIPPED | OUTPATIENT
Start: 2023-12-16 | End: 2023-12-26

## 2023-12-16 ASSESSMENT — PAIN SCALES - GENERAL: PAINLEVEL_OUTOF10: 0

## 2023-12-16 ASSESSMENT — PAIN - FUNCTIONAL ASSESSMENT: PAIN_FUNCTIONAL_ASSESSMENT: 0-10

## 2023-12-16 NOTE — DISCHARGE INSTR - COC
Continuity of Care Form    Patient Name: Ndaya Rasmussen   :  1980  MRN:  [id number]    Admit date:  2023  Discharge date:  ***    Code Status Order: No Order   Advance Directives:     Admitting Physician:  No admitting provider for patient encounter.   PCP: David Louise MD    Discharging Nurse: [facility] Unit/Room#: ED06/ED-06  Discharging Unit Phone Number: ***    Emergency Contact:   Extended Emergency Contact Information  Primary Emergency Contact: Pavan Zavala  Address: [address] [place] [address] [place] Phone: [phone]  Relation: Domestic Partner    Past Surgical History:  Past Surgical History:   Procedure Laterality Date    DENTAL SURGERY  13       Immunization History:   Immunization History   Administered Date(s) Administered    COVID-19, J&J, (age 18y+), IM, 0.5 mL 2021    TDaP, ADACEL (age 6y-58y), [facility] (age 10y+), IM, 0.5mL 2011, 10/19/2017       Active Problems:  Patient Active Problem List   Diagnosis Code    Former smoker Z87.891    Mild opioid use disorder, in sustained remission ([address]) F11.11    Attention deficit hyperactivity disorder (ADHD), combined type F90.2    Moderate episode of recurrent major depressive disorder ([address]) F33.1    HTN, Primary hypertension I10    Migraine with aura and without status migrainosus, not intractable E53.981    Metabolic syndrome H69.415    Prediabetes R73.03    Gastroesophageal reflux disease without esophagitis K21.9    RLS (restless legs syndrome) G25.81    Seasonal allergic rhinitis due to pollen J30.1    Lateral epicondylitis of left elbow M77.12    Severe episode of recurrent major depressive disorder, without psychotic features ([address]) F33.2    HLD, Mixed hyperlipidemia E78.2    Anxiety F41.9    Tachycardia R00.0    Chronic post-traumatic stress disorder F43.12    Class 1 obesity due to excess calories with serious comorbidity and body mass index (BMI) of amt GADNM:855844936}  Last Modified Barium Swallow with Video (Video Swallowing Test): {Done Not Done OVND:305703360}    Treatments at the Time of Hospital Discharge:   Respiratory Treatments: ***  Oxygen Therapy:  {Therapy; copd oxygen:03319}  Ventilator:    { CC Vent UWAW:523279812}    Rehab Therapies: {THERAPEUTIC INTERVENTION:8658173674}  Weight Bearing Status/Restrictions: {[facility] Weight Bearin}  Other Medical Equipment (for information only, NOT a DME order):  {EQUIPMENT:379376275}  Other Treatments: ***    Patient's personal belongings (please select all that are sent with patient):  {[facility] DME Belongings:570432598}    RN SIGNATURE:  {Esignature:293157854}    CASE MANAGEMENT/SOCIAL WORK SECTION    Inpatient Status Date: ***    Readmission Risk Assessment Score:  Readmission Risk              Risk of Unplanned Readmission:  0           Discharging to Facility/ Agency   Name:   Address:  Phone:  Fax:    Dialysis Facility (if applicable)   Name:  Address:  Dialysis Schedule:  Phone:  Fax:    / signature: {Esignature:957347710}    PHYSICIAN SECTION    Prognosis: {Prognosis:6123473289}    Condition at Discharge: [address] Patient Condition:297179912}    Rehab Potential (if transferring to Rehab): {Prognosis:4269548264}    Recommended Labs or Other Treatments After Discharge: ***    Physician Certification: I certify the above information and transfer of Juan Alberto Foote  is necessary for the continuing treatment of the diagnosis listed and that he requires {Admit to Appropriate Level of Care:01160} for {GREATER/LESS:513653775} 30 days.      Update Admission H&P: {[facility] DME Changes in SNIEQ:238449111}    PHYSICIAN SIGNATURE:  {Esignature:605411380}

## 2023-12-16 NOTE — ED TRIAGE NOTES
Pt would like seen for suspected pink eye. Pt states family members have had  a recent dx. Pt woke up this morning with matted eyes.

## 2023-12-16 NOTE — ED PROVIDER NOTES
TABLET    Take 1 tablet by mouth daily    DICYCLOMINE (BENTYL) 10 MG CAPSULE    Take 1 capsule by mouth 4 times daily (before meals and nightly)    HYDROXYZINE HCL (ATARAX) 25 MG TABLET    Take 1 tablet by mouth 2 times daily as needed    LISINOPRIL (PRINIVIL;ZESTRIL) 20 MG TABLET    TAKE 1 TABLET BY MOUTH ONCE DAILY    METFORMIN (GLUCOPHAGE-XR) 500 MG EXTENDED RELEASE TABLET    Take 2 tablets by mouth daily (with breakfast)    METOPROLOL SUCCINATE (TOPROL XL) 50 MG EXTENDED RELEASE TABLET    Take 1 tablet by mouth daily    OMEPRAZOLE (PRILOSEC) 20 MG DELAYED RELEASE CAPSULE    Take 1 capsule by mouth Daily    PALIPERIDONE (INVEGA) 3 MG EXTENDED RELEASE TABLET    Take 1 tablet by mouth every morning    PROPRANOLOL (INDERAL) 10 MG TABLET    TAKE 1 TABLET BY MOUTH TWICE DAILY FOR NIGHTMARES    RIZATRIPTAN (MAXALT) 10 MG TABLET    Take 1 tablet by mouth 2 times daily as needed for Migraine (max of 2 pills per 24 hours.) May repeat in 2 hours if needed    ROPINIROLE (REQUIP) 1 MG TABLET    Take 1 tablet by mouth at bedtime    SUMATRIPTAN (IMITREX) 100 MG TABLET    Take 1 tablet by mouth daily as needed for Migraine       ALLERGIES     Seasonal    FAMILYHISTORY       Family History   Problem Relation Age of Onset    Cirrhosis Maternal Grandfather     Alcohol Abuse Maternal Grandfather         SOCIAL HISTORY       Social History     Tobacco Use    Smoking status: Former     Current packs/day: 0.00     Average packs/day: 1 pack/day for 10.0 years (10.0 ttl pk-yrs)     Types: Cigarettes     Start date: 3/3/2003     Quit date: 3/3/2013     Years since quitting: 10.7    Smokeless tobacco: Never    Tobacco comments:     Vaping now   Vaping Use    Vaping Use: Every day    Substances: Nicotine   Substance Use Topics    Alcohol use: No    Drug use: Not Currently     Types: Methamphetamines (Crystal Meth)     Comment: snort and smoke        SCREENINGS          Jose Coma Scale  Eye Opening: Spontaneous  Best Verbal Response:

## 2023-12-19 PROBLEM — R00.2 PALPITATIONS: Status: ACTIVE | Noted: 2023-12-19

## 2023-12-21 ENCOUNTER — TELEPHONE (OUTPATIENT)
Dept: CARDIOLOGY CLINIC | Age: 43
End: 2023-12-21

## 2023-12-21 NOTE — TELEPHONE ENCOUNTER
Left message for patient requesting a return call to schedule a consult with Zeke for palpitations and shortness of breath per referral from Dr. Caceres.

## 2023-12-26 ENCOUNTER — INITIAL CONSULT (OUTPATIENT)
Dept: CARDIOLOGY CLINIC | Age: 43
End: 2023-12-26
Payer: COMMERCIAL

## 2023-12-26 ENCOUNTER — TELEPHONE (OUTPATIENT)
Dept: CARDIOLOGY CLINIC | Age: 43
End: 2023-12-26

## 2023-12-26 VITALS
WEIGHT: 236 LBS | HEART RATE: 76 BPM | HEIGHT: 70 IN | BODY MASS INDEX: 33.79 KG/M2 | DIASTOLIC BLOOD PRESSURE: 78 MMHG | SYSTOLIC BLOOD PRESSURE: 108 MMHG

## 2023-12-26 DIAGNOSIS — Z72.0 NICOTINE ABUSE: ICD-10-CM

## 2023-12-26 DIAGNOSIS — R00.2 PALPITATIONS: ICD-10-CM

## 2023-12-26 DIAGNOSIS — I10 ESSENTIAL HYPERTENSION: ICD-10-CM

## 2023-12-26 DIAGNOSIS — R06.02 SHORTNESS OF BREATH: Primary | ICD-10-CM

## 2023-12-26 DIAGNOSIS — F33.1 MODERATE EPISODE OF RECURRENT MAJOR DEPRESSIVE DISORDER (HCC): ICD-10-CM

## 2023-12-26 PROCEDURE — G8484 FLU IMMUNIZE NO ADMIN: HCPCS | Performed by: INTERNAL MEDICINE

## 2023-12-26 PROCEDURE — 3078F DIAST BP <80 MM HG: CPT | Performed by: INTERNAL MEDICINE

## 2023-12-26 PROCEDURE — 1036F TOBACCO NON-USER: CPT | Performed by: INTERNAL MEDICINE

## 2023-12-26 PROCEDURE — G8417 CALC BMI ABV UP PARAM F/U: HCPCS | Performed by: INTERNAL MEDICINE

## 2023-12-26 PROCEDURE — 3074F SYST BP LT 130 MM HG: CPT | Performed by: INTERNAL MEDICINE

## 2023-12-26 PROCEDURE — 99204 OFFICE O/P NEW MOD 45 MIN: CPT | Performed by: INTERNAL MEDICINE

## 2023-12-26 PROCEDURE — G8427 DOCREV CUR MEDS BY ELIG CLIN: HCPCS | Performed by: INTERNAL MEDICINE

## 2023-12-26 PROCEDURE — 93000 ELECTROCARDIOGRAM COMPLETE: CPT | Performed by: INTERNAL MEDICINE

## 2023-12-26 RX ORDER — DULOXETIN HYDROCHLORIDE 30 MG/1
30 CAPSULE, DELAYED RELEASE ORAL DAILY
Qty: 30 CAPSULE | Refills: 1 | Status: SHIPPED | OUTPATIENT
Start: 2023-12-26

## 2023-12-26 NOTE — PROGRESS NOTES
mouth daily TAKE 1 TABLET BY MOUTH ONCE DAILY 90 tablet 2    vilazodone HCl (VIIBRYD) 40 MG TABS Take 1 tablet by mouth daily 30 tablet 1    erythromycin (ROMYCIN) 5 MG/GM ophthalmic ointment Install 0.5in strip to both eyes 4 times daily. 3.5 g 0    SUMAtriptan (IMITREX) 100 MG tablet Take 1 tablet by mouth daily as needed for Migraine 9 tablet 1    metFORMIN (GLUCOPHAGE-XR) 500 MG extended release tablet Take 2 tablets by mouth daily (with breakfast) 180 tablet 3    hydrOXYzine HCl (ATARAX) 25 MG tablet Take 1 tablet by mouth 2 times daily as needed      omeprazole (PRILOSEC) 20 MG delayed release capsule Take 1 capsule by mouth Daily 90 capsule 2    rOPINIRole (REQUIP) 1 MG tablet Take 1 tablet by mouth at bedtime 90 tablet 1    cetirizine (ZYRTEC) 10 MG tablet Take 1 tablet by mouth daily 90 tablet 3    paliperidone (INVEGA) 3 MG extended release tablet Take 1 tablet by mouth every morning      rizatriptan (MAXALT) 10 MG tablet Take 1 tablet by mouth 2 times daily as needed for Migraine (max of 2 pills per 24 hours.) May repeat in 2 hours if needed 12 tablet 5    metoprolol succinate (TOPROL XL) 50 MG extended release tablet Take 1 tablet by mouth daily 90 tablet 1     No current facility-administered medications for this visit.        Allergies:     Seasonal    Patient History:    Past Medical History:   Diagnosis Date    Abscess 12/08/2012    left forearm    ADHD (attention deficit hyperactivity disorder)     Anxiety 1/1/2010    Cat bite involving extremity 12/22/2011    Cellulitis of antecubital fossa 12/22/2011    Drug addiction ([facility])     heroin    HLD, Mixed hyperlipidemia 01/06/2023    Lateral epicondylitis of left elbow 55/87/5094    Metabolic syndrome 27/66/0751    See lipid panel 3/2/22    Migraine with aura and without status migrainosus, not intractable 03/02/2022    Prediabetes 03/03/2022    Primary hypertension 03/02/2022    Restless legs syndrome 1/1/210    Severe episode of recurrent major

## 2023-12-26 NOTE — PATIENT INSTRUCTIONS
We are committed to providing you the best care possible. If you receive a survey after visiting one of our offices, please take time to share your experience concerning your physician office visit. These surveys are confidential and no health information about you is shared. We are eager to improve for you and we are counting on your feedback to help make that happen. **It is YOUR responsibilty to bring medication bottles and/or updated medication list to [facility]. This will allow us to better serve you and all your healthcare needs**  Thank you for allowing us to care for you today! We want to ensure we can follow your treatment plan and we strive to give you the best outcomes and experience possible. If you ever have a life threatening emergency and call 911 - for an ambulance (EMS)   Our providers can only care for you at:   [facility] or [facility]. Even if you have someone take you or you drive yourself we can only care for you in a [facility]. Our providers are not setup at the other healthcare locations! Please be informed that if you contact our office outside of normal business hours the physician on call cannot help with any scheduling or rescheduling issues, procedure instruction questions or any type of medication issue. We advise you for any urgent/emergency that you go to the nearest emergency room!     PLEASE CALL OUR OFFICE DURING NORMAL BUSINESS HOURS    Monday - Friday   8 am to 5 pm    [place]: [address] [place]: [phone]    [place]:  [phone]

## 2023-12-26 NOTE — TELEPHONE ENCOUNTER
LM for patient to call back and schedule Cardiolite stress test and ECHO. Please schedule with 9-day gap due to insurance requirement.

## 2023-12-28 ENCOUNTER — TELEPHONE (OUTPATIENT)
Dept: CARDIOLOGY CLINIC | Age: 43
End: 2023-12-28

## 2023-12-28 NOTE — TELEPHONE ENCOUNTER
Left a message echo for 12/29  Is still pending raffaele gipson   Advised of cancellation   Asked to call to confirm cancellation

## 2024-01-09 ENCOUNTER — TELEPHONE (OUTPATIENT)
Dept: CARDIOLOGY CLINIC | Age: 44
End: 2024-01-09

## 2024-01-09 NOTE — TELEPHONE ENCOUNTER
Left message to reschedule his NM Stress test, it is still pending.   He will also need his Echo rescheduled

## 2024-01-11 ENCOUNTER — TELEPHONE (OUTPATIENT)
Dept: SLEEP CENTER | Age: 44
End: 2024-01-11

## 2024-01-11 NOTE — TELEPHONE ENCOUNTER
We received a call from Mariza at [facility] stating Moises has not returned their calls for pap setup so they are closing his orders.

## 2024-01-15 ENCOUNTER — TELEPHONE (OUTPATIENT)
Dept: CARDIOLOGY CLINIC | Age: 44
End: 2024-01-15

## 2024-01-16 ENCOUNTER — TELEPHONE (OUTPATIENT)
Dept: CARDIOLOGY CLINIC | Age: 44
End: 2024-01-16

## 2024-02-09 ENCOUNTER — HOSPITAL ENCOUNTER (OUTPATIENT)
Dept: SLEEP CENTER | Age: 44
Discharge: HOME | End: 2024-02-09

## 2024-02-09 ASSESSMENT — SLEEP AND FATIGUE QUESTIONNAIRES
HOW LIKELY ARE YOU TO NOD OFF OR FALL ASLEEP WHILE WATCHING TV: 3
HOW LIKELY ARE YOU TO NOD OFF OR FALL ASLEEP WHILE SITTING INACTIVE IN A PUBLIC PLACE: 1
HOW LIKELY ARE YOU TO NOD OFF OR FALL ASLEEP WHILE SITTING AND TALKING TO SOMEONE: 0
HOW LIKELY ARE YOU TO NOD OFF OR FALL ASLEEP WHILE LYING DOWN TO REST IN THE AFTERNOON WHEN CIRCUMSTANCES PERMIT: 3
HOW LIKELY ARE YOU TO NOD OFF OR FALL ASLEEP WHEN YOU ARE A PASSENGER IN A CAR FOR AN HOUR WITHOUT A BREAK: 3
HOW LIKELY ARE YOU TO NOD OFF OR FALL ASLEEP WHILE SITTING AND READING: 3
ESS TOTAL SCORE: 16
HOW LIKELY ARE YOU TO NOD OFF OR FALL ASLEEP WHILE SITTING QUIETLY AFTER LUNCH WITHOUT ALCOHOL: 3
HOW LIKELY ARE YOU TO NOD OFF OR FALL ASLEEP IN A CAR, WHILE STOPPED FOR A FEW MINUTES IN TRAFFIC: 0

## 2024-03-07 ENCOUNTER — TELEPHONE (OUTPATIENT)
Dept: CARDIOLOGY CLINIC | Age: 44
End: 2024-03-07

## 2024-03-07 NOTE — TELEPHONE ENCOUNTER
Tried to call pt to r/s appt. Needs testing scheduled and then follow up   Skin Substitute Text: The defect edges were debeveled with a #15 scalpel blade.  Given the location of the defect, shape of the defect and the proximity to free margins a skin substitute graft was deemed most appropriate.  The graft material was trimmed to fit the size of the defect. The graft was then placed in the primary defect and oriented appropriately.

## 2024-03-15 DIAGNOSIS — F33.1 MODERATE EPISODE OF RECURRENT MAJOR DEPRESSIVE DISORDER (HCC): ICD-10-CM

## 2024-03-18 RX ORDER — DULOXETIN HYDROCHLORIDE 30 MG/1
30 CAPSULE, DELAYED RELEASE ORAL DAILY
Qty: 30 CAPSULE | Refills: 0 | OUTPATIENT
Start: 2024-03-18

## 2024-03-18 NOTE — TELEPHONE ENCOUNTER
Patient is due for an appointment   Return in about 3 weeks (around 1/9/2024) for depression  (new on vilazodone instead of pristiq) .

## 2024-06-12 DIAGNOSIS — G25.81 RLS (RESTLESS LEGS SYNDROME): ICD-10-CM

## 2024-06-12 RX ORDER — ROPINIROLE 1 MG/1
1 TABLET, FILM COATED ORAL NIGHTLY
Qty: 90 TABLET | Refills: 0 | OUTPATIENT
Start: 2024-06-12

## 2025-02-13 NOTE — PROGRESS NOTES
Ender Farley, was evaluated through a synchronous (real-time) audio-video encounter. The patient (or guardian if applicable) is aware that this is a billable service, which includes applicable co-pays. This Virtual Visit was conducted with patient's (and/or legal guardian's) consent. Patient identification was verified, and a caregiver was present when appropriate. The patient was located at Home: [address] [place] [address]  Provider was located at [facility] ([address]): [id number] [place] [address],  [address] [place] [address]      Ender Farley (:  1980) is a Established patient, presenting virtually for evaluation of the following:    Assessment & Plan   Below is the assessment and plan developed based on review of pertinent history, physical exam, labs, studies, and medications. 1. Fatigue, unspecified type  Assessment & Plan:   He has excessive fatigue despite normal labs. He does snore. He reports that he does some lashing out in his sleep possibly due to PTSD. I will refer for a sleep study. Of note is that he does have hypertension and he does have obesity. He was not in the office today for me to do a neck measurement or check his oropharynx. Orders:  -     [facility]  2. Metabolic syndrome  Assessment & Plan:   Increasing metformin to 2 pills of 500 mg daily. Orders:  -     metFORMIN (GLUCOPHAGE-XR) 500 MG extended release tablet; Take 2 tablets by mouth daily (with breakfast), Disp-180 tablet, R-3Normal  3. Prediabetes  Assessment & Plan:   He reports polyuria and polydipsia. Most recent A1c in 2023 was 6.4. I will increase the metformin to 1000 mg a day (2 pills). Orders:  -     metFORMIN (GLUCOPHAGE-XR) 500 MG extended release tablet; Take 2 tablets by mouth daily (with breakfast), Disp-180 tablet, R-3Normal  4. Screening for viral disease  -     HIV Screen; Future  He requested a test for HIV. Return in about 7 weeks (around 10/30/2023) for polyuria, fatigue. Windy Parks. I contacted patient to check in with her to see how her fasting AM glucose has been since her last appt on 1/30/25. Patient stated that her numbers are still running a little higher, I let her know that via verbal JESI Patiño/IGNACIO she is to increase her Tresiba insulin by 2 units- (22 units) and to let us know in a week how her glucose is. Patient stated that she understood and thanked me.